# Patient Record
Sex: FEMALE | Race: WHITE | NOT HISPANIC OR LATINO | Employment: FULL TIME | URBAN - NONMETROPOLITAN AREA
[De-identification: names, ages, dates, MRNs, and addresses within clinical notes are randomized per-mention and may not be internally consistent; named-entity substitution may affect disease eponyms.]

---
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
## 2010-08-31 LAB — HCV AB SER-ACNC: NEGATIVE

## 2023-03-30 ENCOUNTER — OFFICE VISIT (OUTPATIENT)
Dept: URGENT CARE | Facility: CLINIC | Age: 33
End: 2023-03-30

## 2023-03-30 VITALS
BODY MASS INDEX: 28.25 KG/M2 | RESPIRATION RATE: 18 BRPM | HEART RATE: 80 BPM | WEIGHT: 180 LBS | HEIGHT: 67 IN | DIASTOLIC BLOOD PRESSURE: 66 MMHG | SYSTOLIC BLOOD PRESSURE: 130 MMHG | OXYGEN SATURATION: 99 % | TEMPERATURE: 97.6 F

## 2023-03-30 DIAGNOSIS — S93.402A SPRAIN OF LEFT ANKLE, UNSPECIFIED LIGAMENT, INITIAL ENCOUNTER: Primary | ICD-10-CM

## 2023-03-30 NOTE — PROGRESS NOTES
[facility]        NAME: Dominique Alvarez is a 27 y o  female  : 1990    MRN: [id number]  DATE: 2023  TIME: 12:17 PM    Assessment and Plan   Sprain of left ankle, unspecified ligament, initial encounter [S93 402A]  1  Sprain of left ankle, unspecified ligament, initial encounter          Discussed problem with patient  Following [place] ankle rules, imaging not necessary for left ankle sprain  Advised conservative management by using RICE therapy as well as as needed Tylenol and ibuprofen for pain symptoms  Follow-up with PCP if symptoms not improve and report to the ER symptoms worsen  Patient Instructions       Follow up with PCP in 3-5 days  Proceed to  ER if symptoms worsen  Chief Complaint     Chief Complaint   Patient presents with   • Leg Pain     C/o bilateral leg pain after derby on Tuesday; another player fell on to left leg whereas right leg was kicked back in February  History of Present Illness       49-year-old female presents after a leg injury that occurred 3 days ago  Patient participates in [employer] and states that she got hit into another girl who fell on top of her left ankle  Patient stated she had mild pain complaints at that time but has done nothing for conservative management  Patient was immediately able to get back up and continue playing the rest of the game  Patient reports she was only reported to urgent care because her  told her to be seen  Reports mild left ankle pain complaints but is ambulating without difficulty  Denies any numbness or tingling  Leg Pain         Review of Systems   Review of Systems   Constitutional: Negative for appetite change, chills, fatigue and fever  Respiratory: Negative for cough, shortness of breath, wheezing and stridor  Cardiovascular: Negative for chest pain and palpitations  Musculoskeletal: Negative for gait problem, joint swelling, neck pain and neck stiffness          Minor "left ankle pain   Neurological: Negative for dizziness, syncope, light-headedness and headaches  Current Medications     No current outpatient medications on file  Current Allergies     Allergies as of 03/30/2023 - Reviewed 03/30/2023   Allergen Reaction Noted   • Penicillins Itching and Rash 03/26/2008            The following portions of the patient's history were reviewed and updated as appropriate: allergies, current medications, past family history, past medical history, past social history, past surgical history and problem list      Past Medical History:   Diagnosis Date   • No known health problems        Past Surgical History:   Procedure Laterality Date   • LEG SURGERY Right        Family History   Problem Relation Age of Onset   • No Known Problems Mother    • No Known Problems Father          Medications have been verified  Objective   /66   Pulse 80   Temp 97 6 °F (36 4 °C)   Resp 18   Ht 5' 6 75\" (1 695 m)   Wt 81 6 kg (180 lb)   LMP 02/28/2023   SpO2 99%   BMI 28 40 kg/m²        Physical Exam     Physical Exam  Vitals and nursing note reviewed  Constitutional:       General: She is not in acute distress  Appearance: Normal appearance  She is normal weight  She is not ill-appearing, toxic-appearing or diaphoretic  HENT:      Head: Normocephalic  Cardiovascular:      Rate and Rhythm: Normal rate and regular rhythm  Pulses: Normal pulses  Heart sounds: Normal heart sounds  No murmur heard  No friction rub  No gallop  Pulmonary:      Effort: Pulmonary effort is normal  No respiratory distress  Breath sounds: Normal breath sounds  No stridor  No wheezing, rhonchi or rales  Chest:      Chest wall: No tenderness  Musculoskeletal:      Right ankle: Normal       Left ankle: No swelling or deformity  Tenderness present  Normal range of motion  Anterior drawer test negative  Normal pulse        Left Achilles Tendon: Normal       Comments: Mild " generalized tenderness through left ankle  Full range of motion on active and passive movements without pain  No overlying skin changes such as bruising, swelling, deformity  +2 TP and DP pulses  Denies any numbness or tingling  Skin:     General: Skin is warm  Capillary Refill: Capillary refill takes less than 2 seconds  Coloration: Skin is not jaundiced or pale  Findings: No erythema  Neurological:      General: No focal deficit present  Mental Status: She is alert and oriented to person, place, and time     Psychiatric:         Mood and Affect: Mood normal          Behavior: Behavior normal

## 2023-03-30 NOTE — LETTER
March 30, 2023     Patient: Chon Prajapati   YOB: 1990   Date of Visit: 3/30/2023       To Whom it May Concern:    Bandar Blocker was seen in my clinic on 3/30/2023  Left ankle suggested of sprain and may return to [place]  If you have any questions or concerns, please don't hesitate to call           Sincerely,          Maria Luz Worrell PA-C        CC: No Recipients

## 2023-05-22 ENCOUNTER — OFFICE VISIT (OUTPATIENT)
Dept: URGENT CARE | Facility: CLINIC | Age: 33
End: 2023-05-22

## 2023-05-22 ENCOUNTER — HOSPITAL ENCOUNTER (EMERGENCY)
Facility: HOSPITAL | Age: 33
Discharge: HOME | End: 2023-05-22
Attending: EMERGENCY MEDICINE

## 2023-05-22 ENCOUNTER — APPOINTMENT (EMERGENCY)
Dept: CT IMAGING | Facility: HOSPITAL | Age: 33
End: 2023-05-22

## 2023-05-22 VITALS
HEIGHT: 67 IN | DIASTOLIC BLOOD PRESSURE: 88 MMHG | OXYGEN SATURATION: 99 % | WEIGHT: 180 LBS | TEMPERATURE: 98.8 F | HEART RATE: 77 BPM | RESPIRATION RATE: 18 BRPM | SYSTOLIC BLOOD PRESSURE: 132 MMHG | BODY MASS INDEX: 28.25 KG/M2

## 2023-05-22 VITALS
OXYGEN SATURATION: 98 % | SYSTOLIC BLOOD PRESSURE: 137 MMHG | DIASTOLIC BLOOD PRESSURE: 87 MMHG | TEMPERATURE: 97.9 F | BODY MASS INDEX: 28.19 KG/M2 | RESPIRATION RATE: 16 BRPM | HEIGHT: 67 IN | HEART RATE: 75 BPM

## 2023-05-22 DIAGNOSIS — R19.00 PELVIC MASS: Primary | ICD-10-CM

## 2023-05-22 DIAGNOSIS — R10.31 RIGHT LOWER QUADRANT ABDOMINAL PAIN: Primary | ICD-10-CM

## 2023-05-22 DIAGNOSIS — N13.30 HYDRONEPHROSIS, LEFT: ICD-10-CM

## 2023-05-22 LAB
ALBUMIN SERPL BCP-MCNC: 3.8 G/DL (ref 3.5–5)
ALP SERPL-CCNC: 52 U/L (ref 34–104)
ALT SERPL W P-5'-P-CCNC: 11 U/L (ref 7–52)
ANION GAP SERPL CALCULATED.3IONS-SCNC: 7 MMOL/L (ref 4–13)
APTT PPP: 29 SECONDS (ref 23–37)
AST SERPL W P-5'-P-CCNC: 13 U/L (ref 13–39)
BASOPHILS # BLD AUTO: 0.05 THOUSANDS/ÂΜL (ref 0–0.1)
BASOPHILS NFR BLD AUTO: 1 % (ref 0–1)
BILIRUB SERPL-MCNC: 0.65 MG/DL (ref 0.2–1)
BILIRUB UR QL STRIP: NEGATIVE
BUN SERPL-MCNC: 14 MG/DL (ref 5–25)
CALCIUM SERPL-MCNC: 8.7 MG/DL (ref 8.4–10.2)
CHLORIDE SERPL-SCNC: 104 MMOL/L (ref 96–108)
CLARITY UR: CLEAR
CO2 SERPL-SCNC: 25 MMOL/L (ref 21–32)
COLOR UR: YELLOW
CREAT SERPL-MCNC: 0.89 MG/DL (ref 0.6–1.3)
EOSINOPHIL # BLD AUTO: 0.29 THOUSAND/ÂΜL (ref 0–0.61)
EOSINOPHIL NFR BLD AUTO: 3 % (ref 0–6)
ERYTHROCYTE [DISTWIDTH] IN BLOOD BY AUTOMATED COUNT: 13.4 % (ref 11.6–15.1)
EXT PREGNANCY TEST URINE: NEGATIVE
EXT. CONTROL: NORMAL
GFR SERPL CREATININE-BSD FRML MDRD: 84 ML/MIN/1.73SQ M
GLUCOSE SERPL-MCNC: 139 MG/DL (ref 65–140)
GLUCOSE UR STRIP-MCNC: NEGATIVE MG/DL
HCT VFR BLD AUTO: 40.9 % (ref 34.8–46.1)
HGB BLD-MCNC: 12.8 G/DL (ref 11.5–15.4)
HGB UR QL STRIP.AUTO: NEGATIVE
IMM GRANULOCYTES # BLD AUTO: 0.03 THOUSAND/UL (ref 0–0.2)
IMM GRANULOCYTES NFR BLD AUTO: 0 % (ref 0–2)
INR PPP: 0.99 (ref 0.84–1.19)
KETONES UR STRIP-MCNC: NEGATIVE MG/DL
LACTATE SERPL-SCNC: 1 MMOL/L (ref 0.5–2)
LEUKOCYTE ESTERASE UR QL STRIP: NEGATIVE
LIPASE SERPL-CCNC: 68 U/L (ref 11–82)
LYMPHOCYTES # BLD AUTO: 2.13 THOUSANDS/ÂΜL (ref 0.6–4.47)
LYMPHOCYTES NFR BLD AUTO: 23 % (ref 14–44)
MCH RBC QN AUTO: 28.8 PG (ref 26.8–34.3)
MCHC RBC AUTO-ENTMCNC: 31.3 G/DL (ref 31.4–37.4)
MCV RBC AUTO: 92 FL (ref 82–98)
MONOCYTES # BLD AUTO: 0.77 THOUSAND/ÂΜL (ref 0.17–1.22)
MONOCYTES NFR BLD AUTO: 8 % (ref 4–12)
NEUTROPHILS # BLD AUTO: 6.08 THOUSANDS/ÂΜL (ref 1.85–7.62)
NEUTS SEG NFR BLD AUTO: 65 % (ref 43–75)
NITRITE UR QL STRIP: NEGATIVE
NRBC BLD AUTO-RTO: 0 /100 WBCS
PH UR STRIP.AUTO: 6 [PH]
PLATELET # BLD AUTO: 277 THOUSANDS/UL (ref 149–390)
PMV BLD AUTO: 10.8 FL (ref 8.9–12.7)
POTASSIUM SERPL-SCNC: 3.4 MMOL/L (ref 3.5–5.3)
PROCALCITONIN SERPL-MCNC: <0.05 NG/ML
PROT SERPL-MCNC: 6.9 G/DL (ref 6.4–8.4)
PROT UR STRIP-MCNC: NEGATIVE MG/DL
PROTHROMBIN TIME: 13.2 SECONDS (ref 11.6–14.5)
RBC # BLD AUTO: 4.44 MILLION/UL (ref 3.81–5.12)
SL AMB  POCT GLUCOSE, UA: NEGATIVE
SL AMB LEUKOCYTE ESTERASE,UA: NEGATIVE
SL AMB POCT BILIRUBIN,UA: NEGATIVE
SL AMB POCT BLOOD,UA: ABNORMAL
SL AMB POCT CLARITY,UA: CLEAR
SL AMB POCT COLOR,UA: YELLOW
SL AMB POCT KETONES,UA: NEGATIVE
SL AMB POCT NITRITE,UA: NEGATIVE
SL AMB POCT PH,UA: 6
SL AMB POCT SPECIFIC GRAVITY,UA: 1.01
SL AMB POCT URINE HCG: NEGATIVE
SL AMB POCT URINE PROTEIN: NEGATIVE
SL AMB POCT UROBILINOGEN: NEGATIVE
SODIUM SERPL-SCNC: 136 MMOL/L (ref 135–147)
SP GR UR STRIP.AUTO: 1.01 (ref 1–1.03)
UROBILINOGEN UR QL STRIP.AUTO: 0.2 E.U./DL
WBC # BLD AUTO: 9.35 THOUSAND/UL (ref 4.31–10.16)

## 2023-05-22 RX ADMIN — IOHEXOL 100 ML: 350 INJECTION, SOLUTION INTRAVENOUS at 21:23

## 2023-05-22 RX ADMIN — SODIUM CHLORIDE 1000 ML: 0.9 INJECTION, SOLUTION INTRAVENOUS at 20:32

## 2023-05-22 NOTE — PROGRESS NOTES
[facility]        NAME: Miguel Carlton is a 27 y o  female  : 1990    MRN: [id number]  DATE: May 22, 2023  TIME: 6:21 PM    Assessment and Plan   Right lower quadrant abdominal pain [R10 31]  1  Right lower quadrant abdominal pain  POCT urine HCG    POCT urine dip    Transfer to other facility        Urine HCG negative  Urine dip negative aside from trace blood  Concern for palpable defect/mass in lower abdomen and requires higher level of care  Recommend she proceed to ED for further evaluation and management of symptoms  Patient agreeable  Complete assessment deferred to ED  Patient Instructions     Patient Instructions     Urine HCG negative   Follow up with PCP in 3-5 days  Proceed to ER for further evaluation and management of symptoms     Abdominal Pain   AMBULATORY CARE:   Abdominal pain  may be felt anywhere between the bottom of your rib cage and your groin  Acute pain usually lasts less than 3 months  Chronic pain lasts longer than 3 months  Your pain may be sharp or dull  The pain may stay in the same place or move around  You may have the pain all the time, or it may come and go  Depending on the cause, you may also have nausea, vomiting, fever, or diarrhea  Call your local emergency number (911 in the [address] [place] [address],[address]) if:   • You have chest pain or shortness of breath  Seek care immediately if:   • You have pulsing pain in your upper abdomen or lower back that suddenly becomes constant  • Your pain is in the right lower abdominal area and worsens with movement  • You have a fever over 100 4°F (38°C) or shaking chills  • You are vomiting and cannot keep food or liquids down  • Your pain does not improve or gets worse over the next 8 to 12 hours  • You see blood in your vomit or bowel movements, or they look black and tarry  • Your skin or the whites of your eyes turn yellow      • You are a woman and have a large amount of vaginal bleeding that is not your monthly period  Call your doctor if:   • You have pain in your lower back  • You are a man and have pain in your testicles  • You have pain when you urinate  • You have questions or concerns about your condition or care  The cause of your abdominal pain  may not be found  The following are common causes:  • Overeating, gas pains, or food poisoning    • Constipation or diarrhea    • An injury    • Appendicitis, a hernia, or an ulcer    • Infection or a blockage    • A liver, gallbladder, or kidney condition    Treatment for abdominal pain  may include any of the following:  • Medicines  may be given to calm your stomach or prevent vomiting  • Prescription pain medicine  may be given  Ask your healthcare provider how to take this medicine safely  Some prescription pain medicines contain acetaminophen  Do not take other medicines that contain acetaminophen without talking to your healthcare provider  Too much acetaminophen may cause liver damage  Prescription pain medicine may cause constipation  Ask your healthcare provider how to prevent or treat constipation  • Relaxation therapy  may be used along with pain medicine  • Surgery  may be needed, depending on the cause  Manage or prevent abdominal pain:   • Apply heat  on your abdomen for 20 to 30 minutes every 2 hours for as many days as directed  Heat helps decrease pain and muscle spasms  • Make changes to the foods you eat, if needed  Do not eat foods that cause abdominal pain or other symptoms  Eat small meals more often  The following changes may also help:    ? Eat more high-fiber foods if you are constipated  High-fiber foods include fruits, vegetables, whole-grain foods, and legumes such as cueto beans  ? Do not eat foods that cause gas if you have bloating  Examples include broccoli, cabbage, beans, and carbonated drinks      ? Do not eat foods or drinks that contain sorbitol or fructose if you have diarrhea and bloating  Some examples are fruit juices, candy, jelly, and sugar-free gum  ? Do not eat high-fat foods  Examples include fried foods, cheeseburgers, hot dogs, and desserts  • Make changes to the liquids you drink, if needed  Do not drink liquids that cause pain or make it worse, such as orange juice  Drink liquids throughout the day to stay hydrated  The following changes may also help:    ? Drink more liquids to prevent dehydration from diarrhea or vomiting  Ask your healthcare provider how much liquid to drink each day and which liquids are best for you  ? Limit or do not have caffeine  Caffeine may make symptoms such as heartburn or nausea worse  ? Limit or do not drink alcohol  Alcohol can make your abdominal pain worse  Ask your healthcare provider if it is okay for you to drink alcohol  Also ask how much is okay for you to drink  A drink of alcohol is 12 ounces of beer, ½ ounce of liquor, or 5 ounces of wine  • Keep a diary of your abdominal pain  A diary may help your healthcare provider learn what is causing your pain  Include when the pain happens, how long it lasts, and what the pain feels like  Write down any other symptoms you have with abdominal pain  Also write down what you eat, and what symptoms you have after you eat  • Manage stress  Stress may cause abdominal pain  Your healthcare provider may recommend relaxation techniques and deep breathing exercises to help decrease your stress  Your healthcare provider may recommend you talk to someone about your stress or anxiety, such as a counselor or a friend  Get plenty of sleep  Exercise regularly  • Do not smoke  Nicotine and other chemicals in cigarettes can damage your esophagus and stomach  Ask your healthcare provider for information if you currently smoke and need help to quit  E-cigarettes or smokeless tobacco still contain nicotine  Talk to your healthcare provider before you use these products      Follow up "with your doctor as directed:  Write down your questions so you remember to ask them during your visits  © Copyright Fritz Niño 2022 Information is for End User's use only and may not be sold, redistributed or otherwise used for commercial purposes  The above information is an  only  It is not intended as medical advice for individual conditions or treatments  Talk to your doctor, nurse or pharmacist before following any medical regimen to see if it is safe and effective for you  Chief Complaint     Chief Complaint   Patient presents with   • Abdominal Pain     C/o RLQ pain x1 month; denies any urinary s/s  Last BM was today         History of Present Illness       18-year-old female with no significant past medical history presents with complaints of lower abdominal pain and \"tightness\" x1 month  Patient states that pain will wax and wane however occurs daily  She is reporting right lower quadrant pain at present time  Denies fever, chills, vomiting, diarrhea, or urinary symptoms  Last BM today  She did take OTC Tylenol/ibuprofen previously for pain and stated mild improvement  Denies chance of pregnancy, LMP 5/15/2023  Review of Systems   Review of Systems   Constitutional: Negative for chills and fever  Respiratory: Negative for shortness of breath  Cardiovascular: Negative for chest pain  Gastrointestinal: Positive for abdominal pain  Negative for diarrhea, nausea and vomiting  Genitourinary: Negative for difficulty urinating and dysuria  Skin: Negative for rash  Current Medications     No current outpatient medications on file      Current Allergies     Allergies as of 05/22/2023 - Reviewed 05/22/2023   Allergen Reaction Noted   • Penicillins Itching and Rash 03/26/2008            The following portions of the patient's history were reviewed and updated as appropriate: allergies, current medications, past family history, past medical history, past social " "history, past surgical history and problem list      Past Medical History:   Diagnosis Date   • No known health problems        Past Surgical History:   Procedure Laterality Date   • LEG SURGERY Right        Family History   Problem Relation Age of Onset   • No Known Problems Mother    • No Known Problems Father          Medications have been verified  Objective   /88   Pulse 77   Temp 98 8 °F (37 1 °C)   Resp 18   Ht 5' 7\" (1 702 m)   Wt 81 6 kg (180 lb)   LMP 05/15/2023   SpO2 99%   BMI 28 19 kg/m²   Patient's last menstrual period was 05/15/2023  Physical Exam     Physical Exam  Vitals and nursing note reviewed  Constitutional:       General: She is not in acute distress  Appearance: She is not toxic-appearing  HENT:      Head: Normocephalic  Nose: Nose normal       Mouth/Throat:      Mouth: Mucous membranes are moist       Pharynx: Oropharynx is clear  Eyes:      Conjunctiva/sclera: Conjunctivae normal    Cardiovascular:      Rate and Rhythm: Normal rate and regular rhythm  Heart sounds: Normal heart sounds  Pulmonary:      Effort: Pulmonary effort is normal  No respiratory distress  Breath sounds: Normal breath sounds  No stridor  No wheezing, rhonchi or rales  Abdominal:      General: Bowel sounds are normal       Palpations: There is mass  Tenderness: There is abdominal tenderness in the right lower quadrant  There is no right CVA tenderness or left CVA tenderness  Skin:     General: Skin is warm and dry  Neurological:      Mental Status: She is alert and oriented to person, place, and time  Gait: Gait is intact     Psychiatric:         Mood and Affect: Mood normal          Behavior: Behavior normal                    "

## 2023-05-22 NOTE — PATIENT INSTRUCTIONS
Urine HCG negative   Follow up with PCP in 3-5 days  Proceed to ER for further evaluation and management of symptoms     Abdominal Pain   AMBULATORY CARE:   Abdominal pain  may be felt anywhere between the bottom of your rib cage and your groin  Acute pain usually lasts less than 3 months  Chronic pain lasts longer than 3 months  Your pain may be sharp or dull  The pain may stay in the same place or move around  You may have the pain all the time, or it may come and go  Depending on the cause, you may also have nausea, vomiting, fever, or diarrhea  Call your local emergency number (911 in the [address] [facility],[address]) if:   You have chest pain or shortness of breath  Seek care immediately if:   You have pulsing pain in your upper abdomen or lower back that suddenly becomes constant  Your pain is in the right lower abdominal area and worsens with movement  You have a fever over 100 4°F (38°C) or shaking chills  You are vomiting and cannot keep food or liquids down  Your pain does not improve or gets worse over the next 8 to 12 hours  You see blood in your vomit or bowel movements, or they look black and tarry  Your skin or the whites of your eyes turn yellow  You are a woman and have a large amount of vaginal bleeding that is not your monthly period  Call your doctor if:   You have pain in your lower back  You are a man and have pain in your testicles  You have pain when you urinate  You have questions or concerns about your condition or care  The cause of your abdominal pain  may not be found  The following are common causes:  Overeating, gas pains, or food poisoning    Constipation or diarrhea    An injury    Appendicitis, a hernia, or an ulcer    Infection or a blockage    A liver, gallbladder, or kidney condition    Treatment for abdominal pain  may include any of the following:  Medicines  may be given to calm your stomach or prevent vomiting      Prescription pain medicine  may be given  Ask your healthcare provider how to take this medicine safely  Some prescription pain medicines contain acetaminophen  Do not take other medicines that contain acetaminophen without talking to your healthcare provider  Too much acetaminophen may cause liver damage  Prescription pain medicine may cause constipation  Ask your healthcare provider how to prevent or treat constipation  Relaxation therapy  may be used along with pain medicine  Surgery  may be needed, depending on the cause  Manage or prevent abdominal pain:   Apply heat  on your abdomen for 20 to 30 minutes every 2 hours for as many days as directed  Heat helps decrease pain and muscle spasms  Make changes to the foods you eat, if needed  Do not eat foods that cause abdominal pain or other symptoms  Eat small meals more often  The following changes may also help:    Eat more high-fiber foods if you are constipated  High-fiber foods include fruits, vegetables, whole-grain foods, and legumes such as cueto beans  Do not eat foods that cause gas if you have bloating  Examples include broccoli, cabbage, beans, and carbonated drinks  Do not eat foods or drinks that contain sorbitol or fructose if you have diarrhea and bloating  Some examples are fruit juices, candy, jelly, and sugar-free gum  Do not eat high-fat foods  Examples include fried foods, cheeseburgers, hot dogs, and desserts  Make changes to the liquids you drink, if needed  Do not drink liquids that cause pain or make it worse, such as orange juice  Drink liquids throughout the day to stay hydrated  The following changes may also help:    Drink more liquids to prevent dehydration from diarrhea or vomiting  Ask your healthcare provider how much liquid to drink each day and which liquids are best for you  Limit or do not have caffeine  Caffeine may make symptoms such as heartburn or nausea worse  Limit or do not drink alcohol    Alcohol can make your abdominal pain worse  Ask your healthcare provider if it is okay for you to drink alcohol  Also ask how much is okay for you to drink  A drink of alcohol is 12 ounces of beer, ½ ounce of liquor, or 5 ounces of wine  Keep a diary of your abdominal pain  A diary may help your healthcare provider learn what is causing your pain  Include when the pain happens, how long it lasts, and what the pain feels like  Write down any other symptoms you have with abdominal pain  Also write down what you eat, and what symptoms you have after you eat  Manage stress  Stress may cause abdominal pain  Your healthcare provider may recommend relaxation techniques and deep breathing exercises to help decrease your stress  Your healthcare provider may recommend you talk to someone about your stress or anxiety, such as a counselor or a friend  Get plenty of sleep  Exercise regularly  Do not smoke  Nicotine and other chemicals in cigarettes can damage your esophagus and stomach  Ask your healthcare provider for information if you currently smoke and need help to quit  E-cigarettes or smokeless tobacco still contain nicotine  Talk to your healthcare provider before you use these products  Follow up with your doctor as directed:  Write down your questions so you remember to ask them during your visits  © Copyright Yara Gruber 2022 Information is for End User's use only and may not be sold, redistributed or otherwise used for commercial purposes  The above information is an  only  It is not intended as medical advice for individual conditions or treatments  Talk to your doctor, nurse or pharmacist before following any medical regimen to see if it is safe and effective for you

## 2023-05-22 NOTE — Clinical Note
Hedy Burgos was seen and treated in our emergency department on 5/22/2023  Diagnosis:     Rea Randhawa  may return to work on return date  She may return on this date: 05/29/2023         If you have any questions or concerns, please don't hesitate to call        Lenny Rudolph, DO    ______________________________           _______________          _______________  Hospital Representative                              Date                                Time

## 2023-05-23 ENCOUNTER — TELEPHONE (OUTPATIENT)
Dept: UROLOGY | Facility: MEDICAL CENTER | Age: 33
End: 2023-05-23

## 2023-05-23 ENCOUNTER — TELEPHONE (OUTPATIENT)
Dept: HEMATOLOGY ONCOLOGY | Facility: CLINIC | Age: 33
End: 2023-05-23

## 2023-05-23 NOTE — ED CARE HANDOFF
Emergency Department Sign Out Note        Sign out and transfer of care  See Separate Emergency Department note  The patient, Darren Bee, was evaluated by the previous provider                              ED Course as of 05/23/23 0042   Mon May 22, 2023   2100 Endorsed by [facility] for lower abdominal discomfort for past maybe 2 months, pelvic mass changes, labs and CT pending  2131 UA w Reflex to Microscopic w Reflex to Culture   2131 PREGNANCY TEST URINE: Negative   2131 Potassium(!): 3 4   2131 LACTIC ACID: 1 0   2131 WBC: 9 35   2150 Procalcitonin: <0 05   2218 CT abdomen pelvis with contrast  IMPRESSION:     Two cystic masses in the pelvis the largest is in the midline likely arising from the left ovary measuring 15 7 x 11 0 cm with solid enhancing mural nodules measuring 1 7 cm on image 2/119 and 1 5 cm on image 2/128  Similar appearing but smaller mass   posteriorly likely from the right ovary measures 5 2 x 3 8 cm  Findings could represent endometriomata or mucinous neoplasm  Recommend GYN surgery consultation  The large midline cystic pelvic mass results in moderate left hydroureteronephrosis       2235 Blood pressure improved  Labs reviewed including inflammatory markers which are normal-appearing  Discussed with Jl Brandt and will arrange evaluation as soon as possible as outpatient   [phone] Patient informed of results, copy of CT provided  She is agreeable with close outpatient follow-up and will arrange as soon as possible    She remains comfortable and clinically well and voices understanding to arrange gynecology and urology follow-up as soon as possible and will return immediately if worse or new symptoms     Procedures  MDM        Disposition  Final diagnoses:   Pelvic mass - cystic   Hydronephrosis, left     Time reflects when diagnosis was documented in both MDM as applicable and the Disposition within this note     Time User Action Codes Description Comment    5/22/2023 10:32 PM Durga Javon Add [R19 00] Pelvic mass     5/22/2023 10:33 PM Durga Javon Modify [R19 00] Pelvic mass cystic    5/22/2023 10:33 PM Durga Javon Add [N13 30] Hydronephrosis, left       ED Disposition     ED Disposition   Discharge    Condition   Stable    Date/Time   Mon May 22, 2023 10:31 PM    Comment   Naty Cao discharge to home/self care  Follow-up Information     Follow up With Specialties Details Why Fe Young MD Gynecologic Oncology Schedule an appointment as soon as possible for a visit in 1 week  [address]      Amadeo Lange MD Urology Schedule an appointment as soon as possible for a visit in 1 week  [address] [facility] [address]  [phone]          There are no discharge medications for this patient             ED Provider  Electronically Signed by     Shaun Daniel DO  05/23/23 5670

## 2023-05-23 NOTE — TELEPHONE ENCOUNTER
New Patient Triage  Please Triage:   New Patient-   What is the reason for the patients appointment? Imaging/Lab Results: Ct results   IMPRESSION:     Two cystic masses in the pelvis the largest is in the midline likely arising from the left ovary measuring 15 7 x 11 0 cm with solid enhancing mural nodules measuring 1 7 cm on image 2/119 and 1 5 cm on image 2/128  Similar appearing but smaller mass   posteriorly likely from the right ovary measures 5 2 x 3 8 cm  Findings could represent endometriomata or mucinous neoplasm  Recommend GYN surgery consultation      The large midline cystic pelvic mass results in moderate left hydroureteronephrosis      Insurance: [employer]   Do we accept the pt's insurance or is the patient Self-Pay? Provider & Plan:    Member ID#: [id number]    History  Has the pt had any previous urologist? no    Have pt records been requested?  No    Has the pt had any outside testing done? no    Does the pt have a personal history of cancer?: no

## 2023-05-23 NOTE — ED PROVIDER NOTES
History  Chief Complaint   Patient presents with   • Abdominal Pain     Pt reports RLQ pain worsening over that last month  Patient is a 57-year-old female presents the emergency department due to pain in the right lower quadrant and the abdomen started about 1 month ago has been intermittent worsening was seen at urgent care today referred to the ED for further evaluation  No nausea or vomiting no diarrhea no constipation no fevers or chills  No prior abdominal surgeries  Feels a fullness and bloating pressure in the suprapubic and right lower abdomen  History provided by:  Patient  Abdominal Pain  Pain location:  RLQ  Pain quality: aching and cramping    Pain severity:  Moderate  Onset quality:  Gradual  Duration:  4 weeks  Timing:  Intermittent  Progression:  Worsening  Chronicity:  New  Associated symptoms: no chest pain, no chills, no constipation, no cough, no diarrhea, no dysuria, no fatigue, no fever, no hematuria, no nausea, no shortness of breath, no sore throat and no vomiting        None       Past Medical History:   Diagnosis Date   • No known health problems        Past Surgical History:   Procedure Laterality Date   • LEG SURGERY Right        Family History   Problem Relation Age of Onset   • No Known Problems Mother    • No Known Problems Father      I have reviewed and agree with the history as documented  E-Cigarette/Vaping   • E-Cigarette Use Never User      E-Cigarette/Vaping Substances     Social History     Tobacco Use   • Smoking status: Never   • Smokeless tobacco: Never   Vaping Use   • Vaping Use: Never used   Substance Use Topics   • Alcohol use: Yes     Comment: Occasional   • Drug use: Never       Review of Systems   Constitutional: Negative for activity change, appetite change, chills, fatigue and fever  HENT: Negative for congestion, ear pain, rhinorrhea and sore throat  Eyes: Negative for discharge, redness and visual disturbance     Respiratory: Negative for cough, chest tightness, shortness of breath and wheezing  Cardiovascular: Negative for chest pain and palpitations  Gastrointestinal: Positive for abdominal pain  Negative for constipation, diarrhea, nausea and vomiting  Endocrine: Negative for polydipsia and polyuria  Genitourinary: Negative for difficulty urinating, dysuria, frequency, hematuria and urgency  Musculoskeletal: Negative for arthralgias and myalgias  Skin: Negative for color change, pallor and rash  Neurological: Negative for dizziness, weakness, light-headedness, numbness and headaches  Hematological: Negative for adenopathy  Does not bruise/bleed easily  All other systems reviewed and are negative  Physical Exam  Physical Exam  Vitals and nursing note reviewed  Constitutional:       Appearance: She is well-developed  HENT:      Head: Normocephalic and atraumatic  Right Ear: External ear normal       Left Ear: External ear normal       Nose: Nose normal    Eyes:      Conjunctiva/sclera: Conjunctivae normal       Pupils: Pupils are equal, round, and reactive to light  Cardiovascular:      Rate and Rhythm: Normal rate and regular rhythm  Heart sounds: Normal heart sounds  Pulmonary:      Effort: Pulmonary effort is normal  No respiratory distress  Breath sounds: Normal breath sounds  No wheezing or rales  Chest:      Chest wall: No tenderness  Abdominal:      General: Bowel sounds are normal  There is no distension  Palpations: Abdomen is soft  There is mass  Tenderness: There is abdominal tenderness in the right lower quadrant and suprapubic area  There is no guarding or rebound  Musculoskeletal:         General: Normal range of motion  Cervical back: Normal range of motion and neck supple  Skin:     General: Skin is warm and dry  Neurological:      Mental Status: She is alert and oriented to person, place, and time  Cranial Nerves: No cranial nerve deficit        Sensory: No sensory deficit  Vital Signs  ED Triage Vitals [05/22/23 1958]   Temperature Pulse Respirations Blood Pressure SpO2   97 9 °F (36 6 °C) 86 18 (!) 155/111 99 %      Temp Source Heart Rate Source Patient Position - Orthostatic VS BP Location FiO2 (%)   Temporal Monitor Sitting Left arm --      Pain Score       7           Vitals:    05/22/23 1958   BP: (!) 155/111   Pulse: 86   Patient Position - Orthostatic VS: Sitting         Visual Acuity      ED Medications  Medications   sodium chloride 0 9 % bolus 1,000 mL (1,000 mL Intravenous New Bag 5/22/23 2032)       Diagnostic Studies  Results Reviewed     Procedure Component Value Units Date/Time    Lactic acid, plasma (w/reflex if result > 2 0) [[id number]]  (Normal) Collected: 05/22/23 2034    Lab Status: Final result Specimen: Blood from Arm, Left Updated: 05/22/23 2100     LACTIC ACID 1 0 mmol/L     Narrative:      Result may be elevated if tourniquet was used during collection      Protime-INR [[id number]]  (Normal) Collected: 05/22/23 2034    Lab Status: Final result Specimen: Blood from Arm, Left Updated: 05/22/23 2056     Protime 13 2 seconds      INR 0 99    APTT [[id number]]  (Normal) Collected: 05/22/23 2034    Lab Status: Final result Specimen: Blood from Arm, Left Updated: 05/22/23 2056     PTT 29 seconds     UA w Reflex to Microscopic w Reflex to Culture [[id number]] Collected: 05/22/23 2042    Lab Status: Final result Specimen: Urine, Clean Catch Updated: 05/22/23 2048     Color, UA Yellow     Clarity, UA Clear     Specific Gravity, UA 1 010     pH, UA 6 0     Leukocytes, UA Negative     Nitrite, UA Negative     Protein, UA Negative mg/dl      Glucose, UA Negative mg/dl      Ketones, UA Negative mg/dl      Urobilinogen, UA 0 2 E U /dl      Bilirubin, UA Negative     Occult Blood, UA Negative    POCT pregnancy, urine [[id number]]  (Normal) Resulted: 05/22/23 2044    Lab Status: Final result Updated: 05/22/23 2044     EXT Preg Test, Ur Negative     Control Valid    CBC and differential [[id number]]  (Abnormal) Collected: 05/22/23 2034    Lab Status: Final result Specimen: Blood from Arm, Left Updated: 05/22/23 2042     WBC 9 35 Thousand/uL      RBC 4 44 Million/uL      Hemoglobin 12 8 g/dL      Hematocrit 40 9 %      MCV 92 fL      MCH 28 8 pg      MCHC 31 3 g/dL      RDW 13 4 %      MPV 10 8 fL      Platelets 657 Thousands/uL      nRBC 0 /100 WBCs      Neutrophils Relative 65 %      Immat GRANS % 0 %      Lymphocytes Relative 23 %      Monocytes Relative 8 %      Eosinophils Relative 3 %      Basophils Relative 1 %      Neutrophils Absolute 6 08 Thousands/µL      Immature Grans Absolute 0 03 Thousand/uL      Lymphocytes Absolute 2 13 Thousands/µL      Monocytes Absolute 0 77 Thousand/µL      Eosinophils Absolute 0 29 Thousand/µL      Basophils Absolute 0 05 Thousands/µL     Procalcitonin [[id number]] Collected: 05/22/23 2034    Lab Status: In process Specimen: Blood from Arm, Left Updated: 05/22/23 2039    Comprehensive metabolic panel [[id number]] Collected: 05/22/23 2034    Lab Status: In process Specimen: Blood from Arm, Left Updated: 05/22/23 2039    Lipase [[id number]] Collected: 05/22/23 2034    Lab Status: In process Specimen: Blood from Arm, Left Updated: 05/22/23 2039                 CT abdomen pelvis with contrast    (Results Pending)              Procedures  Procedures         ED Course  ED Course as of 05/22/23 2109   Mon May 22, 2023   2105 Case discussed and care transferred to Dr Jared Mejia pending imaging results and disposition  SBIRT 20yo+    Flowsheet Row Most Recent Value   Initial Alcohol Screen: US AUDIT-C     1  How often do you have a drink containing alcohol? 0 Filed at: 05/22/2023 2016   2  How many drinks containing alcohol do you have on a typical day you are drinking? 0 Filed at: 05/22/2023 2016   3b  FEMALE Any Age, or MALE 65+: How often do you have 4 or more drinks on one occassion?  0 Filed at: 05/22/2023 2016 Audit-C Score 0 Filed at: 05/22/2023 2016   TERESITA: How many times in the past year have you    Used an illegal drug or used a prescription medication for non-medical reasons? Never Filed at: 05/22/2023 2016                    MDM    Disposition  Final diagnoses:   None     ED Disposition     None      Follow-up Information    None         Patient's Medications    No medications on file       No discharge procedures on file      PDMP Review     None          ED Provider  Electronically Signed by           Alicia Zepeda DO  05/22/23 5045

## 2023-05-23 NOTE — TELEPHONE ENCOUNTER
Appointment Schedule   Who are you speaking with? Patient   If it is not the patient, are they listed on an active communication consent form? N/A   Which provider is the appointment scheduled with? Dr Dc Mcintyre   At which location is the appointment scheduled for? [facility]   When is the appointment scheduled? Please list date and time 5/30 1pm    What is the reason for this appointment? New patient appointment    Did patient voice understanding of the details of this appointment? Yes   Was the no show policy reviewed with patient?  Yes

## 2023-05-23 NOTE — TELEPHONE ENCOUNTER
Patient Call    Who are you speaking with? Patient    If it is not the patient, are they listed on an active communication consent form? N/A   What is the reason for this call? Patient is checking in on the status of her referral    I informed the patient that the team is reviewing it currently and they will give her a call back to schedule  Does this require a call back? Yes   If a call back is required, please list best call back number [phone]   If a call back is required, advise that a message will be forwarded to their care team and someone will return their call as soon as possible  Did you relay this information to the patient?  Yes

## 2023-05-23 NOTE — DISCHARGE INSTRUCTIONS
Pelvic cystic masses with related hydronephrosis  Please arrange to see gynecology oncology soon as possible as well as urology  Return immediately if worse or any new symptoms

## 2023-05-25 ENCOUNTER — TELEPHONE (OUTPATIENT)
Dept: HEMATOLOGY ONCOLOGY | Facility: CLINIC | Age: 33
End: 2023-05-25

## 2023-05-25 NOTE — TELEPHONE ENCOUNTER
Appointment Confirmation   Who are you speaking with? Patient   If it is not the patient, are they listed on an active communication consent form? N/A   Which provider is the appointment scheduled with? Dr Endy Alcantara   When is the appointment scheduled? Please list date and time 5/30/23 1:00PM   At which location is the appointment scheduled to take place? [facility]   Did caller verbalize understanding of appointment details?  Yes     Verified we take [place] [employer]

## 2023-05-30 ENCOUNTER — CONSULT (OUTPATIENT)
Dept: GYNECOLOGIC ONCOLOGY | Facility: CLINIC | Age: 33
End: 2023-05-30

## 2023-05-30 VITALS
HEIGHT: 67 IN | HEART RATE: 67 BPM | TEMPERATURE: 97.9 F | BODY MASS INDEX: 29.51 KG/M2 | RESPIRATION RATE: 16 BRPM | WEIGHT: 188 LBS | DIASTOLIC BLOOD PRESSURE: 80 MMHG | OXYGEN SATURATION: 99 % | SYSTOLIC BLOOD PRESSURE: 132 MMHG

## 2023-05-30 DIAGNOSIS — R19.00 PELVIC MASS: ICD-10-CM

## 2023-05-30 PROBLEM — N13.30 HYDRONEPHROSIS: Status: ACTIVE | Noted: 2023-05-30

## 2023-05-30 RX ORDER — CELECOXIB 100 MG/1
200 CAPSULE ORAL ONCE
OUTPATIENT
Start: 2023-05-30 | End: 2023-05-30

## 2023-05-30 RX ORDER — GABAPENTIN 100 MG/1
100 CAPSULE ORAL ONCE
OUTPATIENT
Start: 2023-05-30 | End: 2023-05-30

## 2023-05-30 RX ORDER — ACETAMINOPHEN 325 MG/1
975 TABLET ORAL ONCE
OUTPATIENT
Start: 2023-05-30 | End: 2023-05-30

## 2023-05-30 RX ORDER — HEPARIN SODIUM 5000 [USP'U]/ML
5000 INJECTION, SOLUTION INTRAVENOUS; SUBCUTANEOUS
OUTPATIENT
Start: 2023-05-31 | End: 2023-06-01

## 2023-05-30 NOTE — H&P (VIEW-ONLY)
Assessment/Plan:    Problem List Items Addressed This Visit        Other    Pelvic mass     The patient has been recently diagnosed with a ovarian mass  We discussed with the patient   All new solid masses run approximately 10 to 20% risk of ovarian cancer  These generally do not resolve  We have discussed treatment options including observation with follow-up in 2 months including possible ultrasound at that time, or immediate surgery  The patient remains somewhat ambivalent regarding fertility  We have recommended the following:    Exploratory laparotomy left salpingo-oophorectomy right ovarian cystectomy with possible hysterectomy bilateral salpingo-oophorectomy with possible pelvic and para-aortic lymph node dissection staging biopsies including omentectomy based on findings at frozen section     The patient may elect to have OCHOA/BSO anyway as she is leaning toward no fertility in future and is having pain with her menses  Have discussed risks and benefits of the procedure including bleeding requiring transfusion infection, infection, damage to local structures including bowel bladder ureter and other local organs  We discussed the risk of deep venous thrombosis  An open procedure may be required  All of these complications are in the 2-4% range of likelihood  The patient understands the risks and benefits of the procedure and has signed an informed consent  I personally signed the consent form with her  She does understand that further treatment including chemotherapy radiation therapy or hormones may be required based on the final postoperative pathologic diagnosis and staging  Standard preoperative testing including type and screen is CBC CPM P chest x-ray and EKG will be ordered  Any appropriate consultations for preoperative evaluation will also be ordered  Overall consultation took 60 min with greater than 50% in dedicated toward discussion time           Relevant Orders    Case request operating room: LAPAROTOMY EXPLORATORY left salpingo-oophorectomy right ovarian cystectomy possible OCHOA/BSO staging (Completed)    Type and screen    Comprehensive metabolic panel    HEMOGLOBIN A1C W/ EAG ESTIMATION        XR chest pa & lateral           CHIEF COMPLAINT: Bilateral ovarian masses        Patient ID: Kris Quiroga is a 27 y o  female  She is a very pleasant 26-year-old  0 seen in consultation from the emergency department regarding evaluation and management of abdominal masses  Patient was in her usual state of health when she began to develop abdominal bloating distention and pain over the course of the past 4 weeks  She describes her menstrual periods as lasting 5 to 7 days with moderate degree of flow and not enough pain to require consistent nonsteroidal pain medicines  These have been slowly worsening however over the years  She has no intermenstrual bleeding she has no bowel or bladder complaints she underwent CAT scan which reveals the following:  FINDINGS:     ABDOMEN     LOWER CHEST:  No clinically significant abnormality identified in the visualized lower chest      LIVER/BILIARY TREE:  Unremarkable      GALLBLADDER:  No calcified gallstones  No pericholecystic inflammatory change      SPLEEN:  Unremarkable      PANCREAS:  Unremarkable      ADRENAL GLANDS:  Unremarkable      KIDNEYS/URETERS: Moderate left hydroureteronephrosis due to a large complex cystic mass in the pelvis  See description below  Right kidney normal      STOMACH AND BOWEL:  Unremarkable      APPENDIX: A normal appendix was visualized      ABDOMINOPELVIC CAVITY: Free fluid in the pelvis   No free air or adenopathy      VESSELS:  Unremarkable for patient's age      PELVIS     REPRODUCTIVE ORGANS: There are 2 cystic masses in the pelvis the largest is in the midline likely arising from the right ovary measuring 15 7 x 11 0 cm with solid enhancing mural nodules measuring 1 7 cm on image 2/119 and 1 5 cm on image 2/128  Similar   appearing but smaller mass posteriorly likely from the right ovary measures 5 2 x 3 8 cm      URINARY BLADDER:  Unremarkable      ABDOMINAL WALL/INGUINAL REGIONS:  Unremarkable      OSSEOUS STRUCTURES:  No acute fracture or destructive osseous lesion      IMPRESSION:     Two cystic masses in the pelvis the largest is in the midline likely arising from the left ovary measuring 15 7 x 11 0 cm with solid enhancing mural nodules measuring 1 7 cm on image 2/119 and 1 5 cm on image 2/128  Similar appearing but smaller mass   posteriorly likely from the right ovary measures 5 2 x 3 8 cm  Findings could represent endometriomata or mucinous neoplasm  Recommend GYN surgery consultation      The large midline cystic pelvic mass results in moderate left hydroureteronephrosis  Today, the patient is doing well  She denies significant abdominal pain, pelvic pain, nausea, vomiting, constipation, diarrhea, fevers, chills, or vaginal bleeding  The patient is somewhat ambivalent regarding pregnancy  If a malignancy is identified she does not wish to maintain fertility and wishes to undergo standard of care surgical treatment  However if removal of uterus and ovaries could be avoided with the potential of fertility in the future the patient is interested in this  Review of Systems   Constitutional: Negative  HENT: Negative  Eyes: Negative  Respiratory: Negative  Cardiovascular: Negative  Gastrointestinal: Positive for abdominal distention and abdominal pain  Endocrine: Negative  Genitourinary: Negative  Musculoskeletal: Negative  Skin: Negative  Neurological: Negative  Hematological: Negative  Psychiatric/Behavioral: Negative  No current outpatient medications on file  No current facility-administered medications for this visit         Allergies   Allergen Reactions   • Penicillins Itching and Rash     Rash  Rash         Past Medical History:   Diagnosis "Date   • No known health problems        Past Surgical History:   Procedure Laterality Date   • LEG SURGERY Right        OB History        0    Para   0    Term   0       0    AB   0    Living   0       SAB   0    IAB   0    Ectopic   0    Multiple   0    Live Births   0                 Family History   Problem Relation Age of Onset   • Ovarian cysts Mother    • No Known Problems Father        The following portions of the patient's history were reviewed and updated as appropriate: allergies, current medications, past family history, past social history, past surgical history and problem list       Objective:    Blood pressure 132/80, pulse 67, temperature 97 9 °F (36 6 °C), temperature source Temporal, resp  rate 16, height 5' 7\" (1 702 m), weight 85 3 kg (188 lb), last menstrual period 05/15/2023, SpO2 99 %  Body mass index is 29 44 kg/m²  Physical Exam  Constitutional:       Appearance: She is well-developed  HENT:      Head: Normocephalic and atraumatic  Eyes:      Pupils: Pupils are equal, round, and reactive to light  Cardiovascular:      Rate and Rhythm: Normal rate and regular rhythm  Heart sounds: Normal heart sounds  Pulmonary:      Effort: Pulmonary effort is normal  No respiratory distress  Breath sounds: Normal breath sounds  Abdominal:      General: Bowel sounds are normal  There is no distension  Palpations: Abdomen is soft  Abdomen is not rigid  Tenderness: There is no abdominal tenderness  There is no guarding or rebound  Genitourinary:     Comments: -Normal external female genitalia, normal Bartholin's and Jasper's glands                  -Normal midline urethral meatus   No lesions notes                  -Bladder without fullness mass or tenderness                  -Vagina without lesion or discharge No significant cystocele or rectocele noted                  -Cervix normal appearing without visible lesions                  -Uterus with normal " "contour, mobility  No tenderness,                  -Adnexae with large mass approximately 20 x 10 cm in the midline and extending to the bilateral sidewalls up to the level of the umbilicus  There is no extraovarian masses or nodularity  No tenderness noted                   - Anus without fissure of lesion    Musculoskeletal:         General: Normal range of motion  Cervical back: Normal range of motion and neck supple  Lymphadenopathy:      Cervical: No cervical adenopathy  Upper Body:      Right upper body: No supraclavicular adenopathy  Left upper body: No supraclavicular adenopathy  Skin:     General: Skin is warm and dry  Neurological:      Mental Status: She is alert and oriented to person, place, and time     Psychiatric:         Behavior: Behavior normal            No results found for: \"\"  Lab Results   Component Value Date    HCT 40 9 05/22/2023    HGB 12 8 05/22/2023    MCV 92 05/22/2023     05/22/2023    WBC 9 35 05/22/2023     Lab Results   Component Value Date    ALKPHOS 52 05/22/2023    ALT 11 05/22/2023    AST 13 05/22/2023    BUN 14 05/22/2023    CALCIUM 8 7 05/22/2023     05/22/2023    CO2 25 05/22/2023    CREATININE 0 89 05/22/2023    EGFR 84 05/22/2023    K 3 4 (L) 05/22/2023        Trend:  No results found for: \"\"    "

## 2023-05-30 NOTE — ASSESSMENT & PLAN NOTE
The patient has been recently diagnosed with a ovarian mass  We discussed with the patient   All new solid masses run approximately 10 to 20% risk of ovarian cancer  These generally do not resolve  We have discussed treatment options including observation with follow-up in 2 months including possible ultrasound at that time, or immediate surgery  The patient remains somewhat ambivalent regarding fertility  We have recommended the following:    Exploratory laparotomy left salpingo-oophorectomy right ovarian cystectomy with possible hysterectomy bilateral salpingo-oophorectomy with possible pelvic and para-aortic lymph node dissection staging biopsies including omentectomy based on findings at frozen section     The patient may elect to have OCHOA/BSO anyway as she is leaning toward no fertility in future and is having pain with her menses  Have discussed risks and benefits of the procedure including bleeding requiring transfusion infection, infection, damage to local structures including bowel bladder ureter and other local organs  We discussed the risk of deep venous thrombosis  An open procedure may be required  All of these complications are in the 2-4% range of likelihood  The patient understands the risks and benefits of the procedure and has signed an informed consent  I personally signed the consent form with her  She does understand that further treatment including chemotherapy radiation therapy or hormones may be required based on the final postoperative pathologic diagnosis and staging  Standard preoperative testing including type and screen is CBC CPM P chest x-ray and EKG will be ordered  Any appropriate consultations for preoperative evaluation will also be ordered  Overall consultation took 60 min with greater than 50% in dedicated toward discussion time

## 2023-05-30 NOTE — PROGRESS NOTES
Assessment/Plan:    Problem List Items Addressed This Visit        Other    Pelvic mass     The patient has been recently diagnosed with a ovarian mass  We discussed with the patient   All new solid masses run approximately 10 to 20% risk of ovarian cancer  These generally do not resolve  We have discussed treatment options including observation with follow-up in 2 months including possible ultrasound at that time, or immediate surgery  The patient remains somewhat ambivalent regarding fertility  We have recommended the following:    Exploratory laparotomy left salpingo-oophorectomy right ovarian cystectomy with possible hysterectomy bilateral salpingo-oophorectomy with possible pelvic and para-aortic lymph node dissection staging biopsies including omentectomy based on findings at frozen section     The patient may elect to have OCHOA/BSO anyway as she is leaning toward no fertility in future and is having pain with her menses  Have discussed risks and benefits of the procedure including bleeding requiring transfusion infection, infection, damage to local structures including bowel bladder ureter and other local organs  We discussed the risk of deep venous thrombosis  An open procedure may be required  All of these complications are in the 2-4% range of likelihood  The patient understands the risks and benefits of the procedure and has signed an informed consent  I personally signed the consent form with her  She does understand that further treatment including chemotherapy radiation therapy or hormones may be required based on the final postoperative pathologic diagnosis and staging  Standard preoperative testing including type and screen is CBC CPM P chest x-ray and EKG will be ordered  Any appropriate consultations for preoperative evaluation will also be ordered  Overall consultation took 60 min with greater than 50% in dedicated toward discussion time           Relevant Orders    Case request operating room: LAPAROTOMY EXPLORATORY left salpingo-oophorectomy right ovarian cystectomy possible OCHOA/BSO staging (Completed)    Type and screen    Comprehensive metabolic panel    HEMOGLOBIN A1C W/ EAG ESTIMATION        XR chest pa & lateral           CHIEF COMPLAINT: Bilateral ovarian masses        Patient ID: Miguel Carlton is a 27 y o  female  She is a very pleasant 66-year-old  0 seen in consultation from the emergency department regarding evaluation and management of abdominal masses  Patient was in her usual state of health when she began to develop abdominal bloating distention and pain over the course of the past 4 weeks  She describes her menstrual periods as lasting 5 to 7 days with moderate degree of flow and not enough pain to require consistent nonsteroidal pain medicines  These have been slowly worsening however over the years  She has no intermenstrual bleeding she has no bowel or bladder complaints she underwent CAT scan which reveals the following:  FINDINGS:     ABDOMEN     LOWER CHEST:  No clinically significant abnormality identified in the visualized lower chest      LIVER/BILIARY TREE:  Unremarkable      GALLBLADDER:  No calcified gallstones  No pericholecystic inflammatory change      SPLEEN:  Unremarkable      PANCREAS:  Unremarkable      ADRENAL GLANDS:  Unremarkable      KIDNEYS/URETERS: Moderate left hydroureteronephrosis due to a large complex cystic mass in the pelvis  See description below  Right kidney normal      STOMACH AND BOWEL:  Unremarkable      APPENDIX: A normal appendix was visualized      ABDOMINOPELVIC CAVITY: Free fluid in the pelvis   No free air or adenopathy      VESSELS:  Unremarkable for patient's age      PELVIS     REPRODUCTIVE ORGANS: There are 2 cystic masses in the pelvis the largest is in the midline likely arising from the right ovary measuring 15 7 x 11 0 cm with solid enhancing mural nodules measuring 1 7 cm on image 2/119 and 1 5 cm on image 2/128  Similar   appearing but smaller mass posteriorly likely from the right ovary measures 5 2 x 3 8 cm      URINARY BLADDER:  Unremarkable      ABDOMINAL WALL/INGUINAL REGIONS:  Unremarkable      OSSEOUS STRUCTURES:  No acute fracture or destructive osseous lesion      IMPRESSION:     Two cystic masses in the pelvis the largest is in the midline likely arising from the left ovary measuring 15 7 x 11 0 cm with solid enhancing mural nodules measuring 1 7 cm on image 2/119 and 1 5 cm on image 2/128  Similar appearing but smaller mass   posteriorly likely from the right ovary measures 5 2 x 3 8 cm  Findings could represent endometriomata or mucinous neoplasm  Recommend GYN surgery consultation      The large midline cystic pelvic mass results in moderate left hydroureteronephrosis  Today, the patient is doing well  She denies significant abdominal pain, pelvic pain, nausea, vomiting, constipation, diarrhea, fevers, chills, or vaginal bleeding  The patient is somewhat ambivalent regarding pregnancy  If a malignancy is identified she does not wish to maintain fertility and wishes to undergo standard of care surgical treatment  However if removal of uterus and ovaries could be avoided with the potential of fertility in the future the patient is interested in this  Review of Systems   Constitutional: Negative  HENT: Negative  Eyes: Negative  Respiratory: Negative  Cardiovascular: Negative  Gastrointestinal: Positive for abdominal distention and abdominal pain  Endocrine: Negative  Genitourinary: Negative  Musculoskeletal: Negative  Skin: Negative  Neurological: Negative  Hematological: Negative  Psychiatric/Behavioral: Negative  No current outpatient medications on file  No current facility-administered medications for this visit         Allergies   Allergen Reactions   • Penicillins Itching and Rash     Rash  Rash         Past Medical History:   Diagnosis "Date   • No known health problems        Past Surgical History:   Procedure Laterality Date   • LEG SURGERY Right        OB History        0    Para   0    Term   0       0    AB   0    Living   0       SAB   0    IAB   0    Ectopic   0    Multiple   0    Live Births   0                 Family History   Problem Relation Age of Onset   • Ovarian cysts Mother    • No Known Problems Father        The following portions of the patient's history were reviewed and updated as appropriate: allergies, current medications, past family history, past social history, past surgical history and problem list       Objective:    Blood pressure 132/80, pulse 67, temperature 97 9 °F (36 6 °C), temperature source Temporal, resp  rate 16, height 5' 7\" (1 702 m), weight 85 3 kg (188 lb), last menstrual period 05/15/2023, SpO2 99 %  Body mass index is 29 44 kg/m²  Physical Exam  Constitutional:       Appearance: She is well-developed  HENT:      Head: Normocephalic and atraumatic  Eyes:      Pupils: Pupils are equal, round, and reactive to light  Cardiovascular:      Rate and Rhythm: Normal rate and regular rhythm  Heart sounds: Normal heart sounds  Pulmonary:      Effort: Pulmonary effort is normal  No respiratory distress  Breath sounds: Normal breath sounds  Abdominal:      General: Bowel sounds are normal  There is no distension  Palpations: Abdomen is soft  Abdomen is not rigid  Tenderness: There is no abdominal tenderness  There is no guarding or rebound  Genitourinary:     Comments: -Normal external female genitalia, normal [place]tholin's and [place]'s glands                  -Normal midline urethral meatus   No lesions notes                  -Bladder without fullness mass or tenderness                  -Vagina without lesion or discharge No significant cystocele or rectocele noted                  -Cervix normal appearing without visible lesions                  -Uterus with normal " "contour, mobility  No tenderness,                  -Adnexae with large mass approximately 20 x 10 cm in the midline and extending to the bilateral sidewalls up to the level of the umbilicus  There is no extraovarian masses or nodularity  No tenderness noted                   - Anus without fissure of lesion    Musculoskeletal:         General: Normal range of motion  Cervical back: Normal range of motion and neck supple  Lymphadenopathy:      Cervical: No cervical adenopathy  Upper Body:      Right upper body: No supraclavicular adenopathy  Left upper body: No supraclavicular adenopathy  Skin:     General: Skin is warm and dry  Neurological:      Mental Status: She is alert and oriented to person, place, and time     Psychiatric:         Behavior: Behavior normal            No results found for: \"\"  Lab Results   Component Value Date    HCT 40 9 05/22/2023    HGB 12 8 05/22/2023    MCV 92 05/22/2023     05/22/2023    WBC 9 35 05/22/2023     Lab Results   Component Value Date    ALKPHOS 52 05/22/2023    ALT 11 05/22/2023    AST 13 05/22/2023    BUN 14 05/22/2023    CALCIUM 8 7 05/22/2023     05/22/2023    CO2 25 05/22/2023    CREATININE 0 89 05/22/2023    EGFR 84 05/22/2023    K 3 4 (L) 05/22/2023        Trend:  No results found for: \"\"    "

## 2023-05-30 NOTE — LETTER
May 30, 2023     Tania Casillas DO [address]     Patient: Osman Harvey   YOB: 1990   Date of Visit: 5/30/2023       Dear Dr Sathya Dial:    Thank you for referring Ed Mckee to me for evaluation  Below are my notes for this consultation  If you have questions, please do not hesitate to call me  I look forward to following your patient along with you  Sincerely,        Gabbi Mcpherson MD        CC: No Recipients    Gabbi Mcpherson MD  5/30/2023  1:58 PM  Sign when Signing Visit  Assessment/Plan:    Problem List Items Addressed This Visit          Other    Pelvic mass     The patient has been recently diagnosed with a ovarian mass  We discussed with the patient   All new solid masses run approximately 10 to 20% risk of ovarian cancer  These generally do not resolve  We have discussed treatment options including observation with follow-up in 2 months including possible ultrasound at that time, or immediate surgery  The patient remains somewhat ambivalent regarding fertility  We have recommended the following:    Exploratory laparotomy left salpingo-oophorectomy right ovarian cystectomy with possible hysterectomy bilateral salpingo-oophorectomy with possible pelvic and para-aortic lymph node dissection staging biopsies including omentectomy based on findings at frozen section     The patient may elect to have OCHOA/BSO anyway as she is leaning toward no fertility in future and is having pain with her menses  Have discussed risks and benefits of the procedure including bleeding requiring transfusion infection, infection, damage to local structures including bowel bladder ureter and other local organs  We discussed the risk of deep venous thrombosis  An open procedure may be required  All of these complications are in the 2-4% range of likelihood  The patient understands the risks and benefits of the procedure and has signed an informed consent    I personally signed the consent form with her  She does understand that further treatment including chemotherapy radiation therapy or hormones may be required based on the final postoperative pathologic diagnosis and staging  Standard preoperative testing including type and screen is CBC CPM P chest x-ray and EKG will be ordered  Any appropriate consultations for preoperative evaluation will also be ordered  Overall consultation took 60 min with greater than 50% in dedicated toward discussion time  Relevant Orders    Case request operating room: LAPAROTOMY EXPLORATORY left salpingo-oophorectomy right ovarian cystectomy possible OCHOA/BSO staging (Completed)    Type and screen    Comprehensive metabolic panel    HEMOGLOBIN A1C W/ EAG ESTIMATION        XR chest pa & lateral           CHIEF COMPLAINT: Bilateral ovarian masses        Patient ID: Arturo Foy is a 27 y o  female  She is a very pleasant 77-year-old  0 seen in consultation from the emergency department regarding evaluation and management of abdominal masses  Patient was in her usual state of health when she began to develop abdominal bloating distention and pain over the course of the past 4 weeks  She describes her menstrual periods as lasting 5 to 7 days with moderate degree of flow and not enough pain to require consistent nonsteroidal pain medicines  These have been slowly worsening however over the years  She has no intermenstrual bleeding she has no bowel or bladder complaints she underwent CAT scan which reveals the following:  FINDINGS:     ABDOMEN     LOWER CHEST:  No clinically significant abnormality identified in the visualized lower chest      LIVER/BILIARY TREE:  Unremarkable  GALLBLADDER:  No calcified gallstones  No pericholecystic inflammatory change  SPLEEN:  Unremarkable  PANCREAS:  Unremarkable  ADRENAL GLANDS:  Unremarkable       KIDNEYS/URETERS: Moderate left hydroureteronephrosis due to a large complex cystic mass in the pelvis  See description below  Right kidney normal      STOMACH AND BOWEL:  Unremarkable  APPENDIX: A normal appendix was visualized  ABDOMINOPELVIC CAVITY: Free fluid in the pelvis  No free air or adenopathy  VESSELS:  Unremarkable for patient's age  PELVIS     REPRODUCTIVE ORGANS: There are 2 cystic masses in the pelvis the largest is in the midline likely arising from the right ovary measuring 15 7 x 11 0 cm with solid enhancing mural nodules measuring 1 7 cm on image 2/119 and 1 5 cm on image 2/128  Similar   appearing but smaller mass posteriorly likely from the right ovary measures 5 2 x 3 8 cm  URINARY BLADDER:  Unremarkable  ABDOMINAL WALL/INGUINAL REGIONS:  Unremarkable  OSSEOUS STRUCTURES:  No acute fracture or destructive osseous lesion  IMPRESSION:     Two cystic masses in the pelvis the largest is in the midline likely arising from the left ovary measuring 15 7 x 11 0 cm with solid enhancing mural nodules measuring 1 7 cm on image 2/119 and 1 5 cm on image 2/128  Similar appearing but smaller mass   posteriorly likely from the right ovary measures 5 2 x 3 8 cm  Findings could represent endometriomata or mucinous neoplasm  Recommend GYN surgery consultation  The large midline cystic pelvic mass results in moderate left hydroureteronephrosis  Today, the patient is doing well  She denies significant abdominal pain, pelvic pain, nausea, vomiting, constipation, diarrhea, fevers, chills, or vaginal bleeding  The patient is somewhat ambivalent regarding pregnancy  If a malignancy is identified she does not wish to maintain fertility and wishes to undergo standard of care surgical treatment  However if removal of uterus and ovaries could be avoided with the potential of fertility in the future the patient is interested in this  Review of Systems   Constitutional: Negative  HENT: Negative  Eyes: Negative      Respiratory: "Negative  Cardiovascular: Negative  Gastrointestinal: Positive for abdominal distention and abdominal pain  Endocrine: Negative  Genitourinary: Negative  Musculoskeletal: Negative  Skin: Negative  Neurological: Negative  Hematological: Negative  Psychiatric/Behavioral: Negative  No current outpatient medications on file  No current facility-administered medications for this visit  Allergies   Allergen Reactions   • Penicillins Itching and Rash     Rash  Rash         Past Medical History:   Diagnosis Date   • No known health problems        Past Surgical History:   Procedure Laterality Date   • LEG SURGERY Right        OB History          0    Para   0    Term   0       0    AB   0    Living   0         SAB   0    IAB   0    Ectopic   0    Multiple   0    Live Births   0                 Family History   Problem Relation Age of Onset   • Ovarian cysts Mother    • No Known Problems Father        The following portions of the patient's history were reviewed and updated as appropriate: allergies, current medications, past family history, past social history, past surgical history and problem list       Objective:    Blood pressure 132/80, pulse 67, temperature 97 9 °F (36 6 °C), temperature source Temporal, resp  rate 16, height 5' 7\" (1 702 m), weight 85 3 kg (188 lb), last menstrual period 05/15/2023, SpO2 99 %  Body mass index is 29 44 kg/m²  Physical Exam  Constitutional:       Appearance: She is well-developed  HENT:      Head: Normocephalic and atraumatic  Eyes:      Pupils: Pupils are equal, round, and reactive to light  Cardiovascular:      Rate and Rhythm: Normal rate and regular rhythm  Heart sounds: Normal heart sounds  Pulmonary:      Effort: Pulmonary effort is normal  No respiratory distress  Breath sounds: Normal breath sounds  Abdominal:      General: Bowel sounds are normal  There is no distension        Palpations: Abdomen " "is soft  Abdomen is not rigid  Tenderness: There is no abdominal tenderness  There is no guarding or rebound  Genitourinary:     Comments: -Normal external female genitalia, normal Bartholin's and [place]'s glands                  -Normal midline urethral meatus  No lesions notes                  -Bladder without fullness mass or tenderness                  -Vagina without lesion or discharge No significant cystocele or rectocele noted                  -Cervix normal appearing without visible lesions                  -Uterus with normal contour, mobility  No tenderness,                  -Adnexae with large mass approximately 20 x 10 cm in the midline and extending to the bilateral sidewalls up to the level of the umbilicus  There is no extraovarian masses or nodularity  No tenderness noted  - Anus without fissure of lesion    Musculoskeletal:         General: Normal range of motion  Cervical back: Normal range of motion and neck supple  Lymphadenopathy:      Cervical: No cervical adenopathy  Upper Body:      Right upper body: No supraclavicular adenopathy  Left upper body: No supraclavicular adenopathy  Skin:     General: Skin is warm and dry  Neurological:      Mental Status: She is alert and oriented to person, place, and time     Psychiatric:         Behavior: Behavior normal            No results found for: \"\"  Lab Results   Component Value Date    HCT 40 9 05/22/2023    HGB 12 8 05/22/2023    MCV 92 05/22/2023     05/22/2023    WBC 9 35 05/22/2023     Lab Results   Component Value Date    ALKPHOS 52 05/22/2023    ALT 11 05/22/2023    AST 13 05/22/2023    BUN 14 05/22/2023    CALCIUM 8 7 05/22/2023     05/22/2023    CO2 25 05/22/2023    CREATININE 0 89 05/22/2023    EGFR 84 05/22/2023    K 3 4 (L) 05/22/2023        Trend:  No results found for: \"\"    "

## 2023-05-31 ENCOUNTER — OFFICE VISIT (OUTPATIENT)
Dept: UROLOGY | Facility: CLINIC | Age: 33
End: 2023-05-31

## 2023-05-31 ENCOUNTER — TELEPHONE (OUTPATIENT)
Dept: HEMATOLOGY ONCOLOGY | Facility: CLINIC | Age: 33
End: 2023-05-31

## 2023-05-31 VITALS
OXYGEN SATURATION: 98 % | HEART RATE: 79 BPM | WEIGHT: 189 LBS | SYSTOLIC BLOOD PRESSURE: 124 MMHG | TEMPERATURE: 98.2 F | DIASTOLIC BLOOD PRESSURE: 82 MMHG | HEIGHT: 67 IN | BODY MASS INDEX: 29.66 KG/M2

## 2023-05-31 DIAGNOSIS — N13.30 HYDRONEPHROSIS, UNSPECIFIED HYDRONEPHROSIS TYPE: Primary | ICD-10-CM

## 2023-05-31 LAB
SL AMB  POCT GLUCOSE, UA: NORMAL
SL AMB LEUKOCYTE ESTERASE,UA: NORMAL
SL AMB POCT BILIRUBIN,UA: NORMAL
SL AMB POCT BLOOD,UA: NORMAL
SL AMB POCT CLARITY,UA: CLEAR
SL AMB POCT COLOR,UA: YELLOW
SL AMB POCT KETONES,UA: NORMAL
SL AMB POCT NITRITE,UA: NORMAL
SL AMB POCT PH,UA: 7.5
SL AMB POCT SPECIFIC GRAVITY,UA: 1.01
SL AMB POCT URINE PROTEIN: NORMAL
SL AMB POCT UROBILINOGEN: 0.2

## 2023-05-31 RX ORDER — LEVOFLOXACIN 5 MG/ML
500 INJECTION, SOLUTION INTRAVENOUS ONCE
OUTPATIENT
Start: 2023-05-31 | End: 2023-05-31

## 2023-05-31 NOTE — PROGRESS NOTES
UROLOGY PROGRESS NOTE         NAME: Jennifer Hale  AGE: 27 y o  SEX: female  : 1990   MRN: [id number]    DATE: 2023  TIME: 10:45 AM    Assessment and Plan      Impression:   1  Hydronephrosis, unspecified hydronephrosis type  -     POCT urine dip auto non-scope  -     Case request operating room: CYSTOSCOPY with left ureteral stent placement and left retrograde pyelogram; Standing  -     Case request operating room: CYSTOSCOPY with left ureteral stent placement and left retrograde pyelogram         Plan: Discussed etiology of left hydronephrosis and options  Has a large ovarian mass/cyst as apparent source for left hydro  Would recommend bilateral ureteral stents for upcoming gynecologic surgery for intraoperative ureteral localization  These could be placed immediately prior to her procedure under the same anesthetic  Would recommend that the left stent be an indwelling double J ureteral stent that could remain in place for an extended period of time such as 3 to 6 months if patient requires additional treatment gynecologically  The other option would be to place the left ureteral stent sooner  At this point surgery is not scheduled till mid July  It would be prudent to move ahead and place a left ureteral stent sooner  I discussed this with her and she is agreeable  There is no obstruction on the right side  Placement of bilateral stents at this point could result in more morbidity  A right ureteral stent would be helpful also at the time of her procedure for intraoperative localization of the ureter  My suggestion would be that a right ureteral stent be placed immediately prior to her gynecologic surgery under the same anesthetic as well  Thus, she would have bilateral stents for intraoperative ureteral localization at the time of her gynecologic surgery      The cystoscopy with left ureteral stent procedure along with its potential risks imitations outcomes and alternatives "were reviewed with her in detail  We did discuss the backup of a percutaneous nephrostomy  Informed consent was obtained  Chief Complaint   No chief complaint on file  History of Present Illness     HPI: Ernesto Mendiola is a 27y o  year old female who presents with a large ovarian mass which appears to be resulting in left hydronephrosis  Patient has seen gynecology and a case request was placed for exploratory laparotomy and appropriate gynecologic interventions which depend on inspection pathology at the time and intraoperative findings  A consult was also placed by the gynecologist to gynecologic oncology  The following portions of the patient's history were reviewed and updated as appropriate: allergies, current medications, past family history, past medical history, past social history, past surgical history and problem list   Past Medical History:   Diagnosis Date   • No known health problems      Past Surgical History:   Procedure Laterality Date   • LEG SURGERY Right      shoulder  Review of Systems     Const: Denies chills, fever and weight loss  CV: Denies chest pain  Resp: Denies SOB  GI: Denies abdominal pain, nausea and vomiting  : Denies symptoms other than stated above  Musculo: Denies back pain  Objective   /82 (BP Location: Left arm, Patient Position: Sitting, Cuff Size: Adult)   Pulse 79   Temp 98 2 °F (36 8 °C)   Ht 5' 7\" (1 702 m)   Wt 85 7 kg (189 lb)   LMP 05/15/2023   SpO2 98%   BMI 29 60 kg/m²     Physical Exam  Const: Appears healthy and well developed  No signs of acute distress present  Resp: Respirations are regular and unlabored  CV: Rate is regular  Rhythm is regular  Abdomen: Abdomen is soft, nontender, and nondistended  Kidneys are not palpable  : Pelvic mass noted  This extends well up into the abdomen as well  Protuberant mildly tender  No flank tenderness  Psych: Patient's attitude is cooperative   Mood is normal  Affect is " normal     Procedure   Procedures     Current Medications   No current outpatient medications on file          aMndeep Harley MD

## 2023-05-31 NOTE — H&P
UROLOGY PROGRESS NOTE         NAME: Jennifer Hale  AGE: 27 y o  SEX: female  : 1990   MRN: [id number]    DATE: 2023  TIME: 10:45 AM    Assessment and Plan      Impression:   1  Hydronephrosis, unspecified hydronephrosis type  -     POCT urine dip auto non-scope  -     Case request operating room: CYSTOSCOPY with left ureteral stent placement and left retrograde pyelogram; Standing  -     Case request operating room: CYSTOSCOPY with left ureteral stent placement and left retrograde pyelogram         Plan: Discussed etiology of left hydronephrosis and options  Has a large ovarian mass/cyst as apparent source for left hydro  Would recommend bilateral ureteral stents for upcoming gynecologic surgery for intraoperative ureteral localization  These could be placed immediately prior to her procedure under the same anesthetic  Would recommend that the left stent be an indwelling double J ureteral stent that could remain in place for an extended period of time such as 3 to 6 months if patient requires additional treatment gynecologically  The other option would be to place the left ureteral stent sooner  At this point surgery is not scheduled till mid July  It would be prudent to move ahead and place a left ureteral stent sooner  I discussed this with her and she is agreeable  There is no obstruction on the right side  Placement of bilateral stents at this point could result in more morbidity  A right ureteral stent would be helpful also at the time of her procedure for intraoperative localization of the ureter  My suggestion would be that a right ureteral stent be placed immediately prior to her gynecologic surgery under the same anesthetic as well  Thus, she would have bilateral stents for intraoperative ureteral localization at the time of her gynecologic surgery      The cystoscopy with left ureteral stent procedure along with its potential risks imitations outcomes and alternatives "were reviewed with her in detail  We did discuss the backup of a percutaneous nephrostomy  Informed consent was obtained  Chief Complaint   No chief complaint on file  History of Present Illness     HPI: Ernesto Mendiola is a 27y o  year old female who presents with a large ovarian mass which appears to be resulting in left hydronephrosis  Patient has seen gynecology and a case request was placed for exploratory laparotomy and appropriate gynecologic interventions which depend on inspection pathology at the time and intraoperative findings  A consult was also placed by the gynecologist to gynecologic oncology  The following portions of the patient's history were reviewed and updated as appropriate: allergies, current medications, past family history, past medical history, past social history, past surgical history and problem list   Past Medical History:   Diagnosis Date   • No known health problems      Past Surgical History:   Procedure Laterality Date   • LEG SURGERY Right      shoulder  Review of Systems     Const: Denies chills, fever and weight loss  CV: Denies chest pain  Resp: Denies SOB  GI: Denies abdominal pain, nausea and vomiting  : Denies symptoms other than stated above  Musculo: Denies back pain  Objective   /82 (BP Location: Left arm, Patient Position: Sitting, Cuff Size: Adult)   Pulse 79   Temp 98 2 °F (36 8 °C)   Ht 5' 7\" (1 702 m)   Wt 85 7 kg (189 lb)   LMP 05/15/2023   SpO2 98%   BMI 29 60 kg/m²     Physical Exam  Const: Appears healthy and well developed  No signs of acute distress present  Resp: Respirations are regular and unlabored  CV: Rate is regular  Rhythm is regular  Abdomen: Abdomen is soft, nontender, and nondistended  Kidneys are not palpable  : Pelvic mass noted  This extends well up into the abdomen as well  Protuberant mildly tender  No flank tenderness  Psych: Patient's attitude is cooperative   Mood is normal  Affect is " normal     Procedure   Procedures     Current Medications   No current outpatient medications on file          Rhonda Ann MD

## 2023-05-31 NOTE — H&P (VIEW-ONLY)
UROLOGY PROGRESS NOTE         NAME: Jennifer Hale  AGE: 27 y o  SEX: female  : 1990   MRN: [id number]    DATE: 2023  TIME: 10:45 AM    Assessment and Plan      Impression:   1  Hydronephrosis, unspecified hydronephrosis type  -     POCT urine dip auto non-scope  -     Case request operating room: CYSTOSCOPY with left ureteral stent placement and left retrograde pyelogram; Standing  -     Case request operating room: CYSTOSCOPY with left ureteral stent placement and left retrograde pyelogram         Plan: Discussed etiology of left hydronephrosis and options  Has a large ovarian mass/cyst as apparent source for left hydro  Would recommend bilateral ureteral stents for upcoming gynecologic surgery for intraoperative ureteral localization  These could be placed immediately prior to her procedure under the same anesthetic  Would recommend that the left stent be an indwelling double J ureteral stent that could remain in place for an extended period of time such as 3 to 6 months if patient requires additional treatment gynecologically  The other option would be to place the left ureteral stent sooner  At this point surgery is not scheduled till mid July  It would be prudent to move ahead and place a left ureteral stent sooner  I discussed this with her and she is agreeable  There is no obstruction on the right side  Placement of bilateral stents at this point could result in more morbidity  A right ureteral stent would be helpful also at the time of her procedure for intraoperative localization of the ureter  My suggestion would be that a right ureteral stent be placed immediately prior to her gynecologic surgery under the same anesthetic as well  Thus, she would have bilateral stents for intraoperative ureteral localization at the time of her gynecologic surgery      The cystoscopy with left ureteral stent procedure along with its potential risks imitations outcomes and alternatives "were reviewed with her in detail  We did discuss the backup of a percutaneous nephrostomy  Informed consent was obtained  Chief Complaint   No chief complaint on file  History of Present Illness     HPI: Velma Bobby is a 27y o  year old female who presents with a large ovarian mass which appears to be resulting in left hydronephrosis  Patient has seen gynecology and a case request was placed for exploratory laparotomy and appropriate gynecologic interventions which depend on inspection pathology at the time and intraoperative findings  A consult was also placed by the gynecologist to gynecologic oncology  The following portions of the patient's history were reviewed and updated as appropriate: allergies, current medications, past family history, past medical history, past social history, past surgical history and problem list   Past Medical History:   Diagnosis Date   • No known health problems      Past Surgical History:   Procedure Laterality Date   • LEG SURGERY Right      shoulder  Review of Systems     Const: Denies chills, fever and weight loss  CV: Denies chest pain  Resp: Denies SOB  GI: Denies abdominal pain, nausea and vomiting  : Denies symptoms other than stated above  Musculo: Denies back pain  Objective   /82 (BP Location: Left arm, Patient Position: Sitting, Cuff Size: Adult)   Pulse 79   Temp 98 2 °F (36 8 °C)   Ht 5' 7\" (1 702 m)   Wt 85 7 kg (189 lb)   LMP 05/15/2023   SpO2 98%   BMI 29 60 kg/m²     Physical Exam  Const: Appears healthy and well developed  No signs of acute distress present  Resp: Respirations are regular and unlabored  CV: Rate is regular  Rhythm is regular  Abdomen: Abdomen is soft, nontender, and nondistended  Kidneys are not palpable  : Pelvic mass noted  This extends well up into the abdomen as well  Protuberant mildly tender  No flank tenderness  Psych: Patient's attitude is cooperative   Mood is normal  Affect is " normal     Procedure   Procedures     Current Medications   No current outpatient medications on file          Tomi Monroe MD

## 2023-06-02 ENCOUNTER — TELEPHONE (OUTPATIENT)
Dept: GYNECOLOGIC ONCOLOGY | Facility: CLINIC | Age: 33
End: 2023-06-02

## 2023-06-02 ENCOUNTER — TELEPHONE (OUTPATIENT)
Dept: HEMATOLOGY ONCOLOGY | Facility: CLINIC | Age: 33
End: 2023-06-02

## 2023-06-02 ENCOUNTER — HOSPITAL ENCOUNTER (OUTPATIENT)
Dept: RADIOLOGY | Facility: CLINIC | Age: 33
End: 2023-06-02
Payer: COMMERCIAL

## 2023-06-02 ENCOUNTER — APPOINTMENT (OUTPATIENT)
Dept: LAB | Facility: CLINIC | Age: 33
End: 2023-06-02
Payer: COMMERCIAL

## 2023-06-02 DIAGNOSIS — R19.00 PELVIC MASS: ICD-10-CM

## 2023-06-02 LAB
ALBUMIN SERPL BCP-MCNC: 3.3 G/DL (ref 3.5–5)
ALP SERPL-CCNC: 73 U/L (ref 46–116)
ALT SERPL W P-5'-P-CCNC: 19 U/L (ref 12–78)
ANION GAP SERPL CALCULATED.3IONS-SCNC: 6 MMOL/L (ref 4–13)
AST SERPL W P-5'-P-CCNC: 19 U/L (ref 5–45)
BILIRUB SERPL-MCNC: 0.65 MG/DL (ref 0.2–1)
BUN SERPL-MCNC: 10 MG/DL (ref 5–25)
CALCIUM ALBUM COR SERPL-MCNC: 10 MG/DL (ref 8.3–10.1)
CALCIUM SERPL-MCNC: 9.4 MG/DL (ref 8.3–10.1)
CANCER AG125 SERPL-ACNC: 214.4 U/ML (ref 0–35)
CHLORIDE SERPL-SCNC: 105 MMOL/L (ref 96–108)
CO2 SERPL-SCNC: 23 MMOL/L (ref 21–32)
CREAT SERPL-MCNC: 0.92 MG/DL (ref 0.6–1.3)
EST. AVERAGE GLUCOSE BLD GHB EST-MCNC: 91 MG/DL
GFR SERPL CREATININE-BSD FRML MDRD: 81 ML/MIN/1.73SQ M
GLUCOSE SERPL-MCNC: 86 MG/DL (ref 65–140)
HBA1C MFR BLD: 4.8 %
POTASSIUM SERPL-SCNC: 4.3 MMOL/L (ref 3.5–5.3)
PROT SERPL-MCNC: 8 G/DL (ref 6.4–8.4)
SODIUM SERPL-SCNC: 134 MMOL/L (ref 135–147)

## 2023-06-02 PROCEDURE — 86304 IMMUNOASSAY TUMOR CA 125: CPT

## 2023-06-02 PROCEDURE — 71046 X-RAY EXAM CHEST 2 VIEWS: CPT

## 2023-06-02 PROCEDURE — 83036 HEMOGLOBIN GLYCOSYLATED A1C: CPT

## 2023-06-02 PROCEDURE — 36415 COLL VENOUS BLD VENIPUNCTURE: CPT

## 2023-06-02 PROCEDURE — 80053 COMPREHEN METABOLIC PANEL: CPT

## 2023-06-02 PROCEDURE — 86900 BLOOD TYPING SEROLOGIC ABO: CPT | Performed by: OBSTETRICS & GYNECOLOGY

## 2023-06-02 PROCEDURE — 86850 RBC ANTIBODY SCREEN: CPT | Performed by: OBSTETRICS & GYNECOLOGY

## 2023-06-02 PROCEDURE — 86901 BLOOD TYPING SEROLOGIC RH(D): CPT | Performed by: OBSTETRICS & GYNECOLOGY

## 2023-06-02 NOTE — TELEPHONE ENCOUNTER
Received Paperwork   What type of form FMLA   Scanned blank form into patient's Epic chart YES   Method received form Fax   Provider responsible for form Peterson mai   Informed patient our office turn around time for completing patient forms is 5-7 business days  Yes via [employer] message   Comments       Paperwork Update   What type of form FMLA   Method of returning completed form Mychart, FAX   FMLA/Disability requested start date 05/30/2023   Date need completed form(s) by    Intermittent or Continuous FMLA?  Continuous   If returning via fax, enter fax number [phone]   Additional Comments

## 2023-06-02 NOTE — TELEPHONE ENCOUNTER
Patient Call    Who are you speaking with? Julia from [employer]     If it is not the patient, are they listed on an active communication consent form? N/A   What is the reason for this call? Julia calling for clarification on forms that were faxed to  for this patient    Does this require a call back? Yes   If a call back is required, please list [facility] call back number [phone]   If a call back is required, advise that a message will be forwarded to their care team and someone will return their call as soon as possible  Did you relay this information to the patient?  Yes

## 2023-06-02 NOTE — TELEPHONE ENCOUNTER
Called and spoke to  from [employer] where patient is employed  She had a question about the paperwork  All questions were answered and paperwork was fixed where needed and refaxed to employer HR

## 2023-06-03 ENCOUNTER — LAB REQUISITION (OUTPATIENT)
Dept: LAB | Facility: HOSPITAL | Age: 33
End: 2023-06-03
Payer: COMMERCIAL

## 2023-06-03 DIAGNOSIS — R19.00 INTRA-ABDOMINAL AND PELVIC SWELLING, MASS AND LUMP, UNSPECIFIED SITE: ICD-10-CM

## 2023-06-03 LAB
ABO GROUP BLD: NORMAL
BLD GP AB SCN SERPL QL: NEGATIVE
RH BLD: POSITIVE
SPECIMEN EXPIRATION DATE: NORMAL

## 2023-06-08 NOTE — PRE-PROCEDURE INSTRUCTIONS
No outpatient medications have been marked as taking for the 6/13/23 encounter [facility] Encounter)  Medication instructions for day surgery reviewed  Please use only a sip of water to take your instructed medications  Avoid all over the counter vitamins, supplements and NSAIDS for one week prior to surgery per anesthesia guidelines  Tylenol is ok to take as needed  You will receive a call one business day prior to surgery with an arrival time and hospital directions  If your surgery is scheduled on a Monday, the hospital will be calling you on the Friday prior to your surgery  If you have not heard from anyone by 8pm, please call the hospital supervisor through the hospital  at [phone]  Mary Jurado [phone])  Do not eat or drink anything after midnight the night before your surgery, including candy, mints, lifesavers, or chewing gum  Do not drink alcohol 24hrs before your surgery  Try not to smoke at least 24hrs before your surgery  Follow the pre surgery showering instructions as listed in the [facility] Surgical Experience Booklet” or otherwise provided by your surgeon's office  Do not shave the surgical area 24 hours before surgery  Do not apply any lotions, creams, including makeup, cologne, deodorant, or perfumes after showering on the day of your surgery  No contact lenses, eye make-up, or artificial eyelashes  Remove nail polish, including gel polish, and any artificial, gel, or acrylic nails if possible  Remove all jewelry including rings and body piercing jewelry  Wear causal clothing that is easy to take on and off  Consider your type of surgery  Keep any valuables, jewelry, piercings at home  Please bring any specially ordered equipment (sling, braces) if indicated  Arrange for a responsible person to drive you to and from the hospital on the day of your surgery  Visitor Guidelines discussed       Call the surgeon's office with any new illnesses, exposures, or additional questions prior to surgery  Please reference your [facility] Surgical Experience Booklet” for additional information to prepare for your upcoming surgery

## 2023-06-12 ENCOUNTER — ANESTHESIA EVENT (OUTPATIENT)
Dept: PERIOP | Facility: HOSPITAL | Age: 33
DRG: 737 | End: 2023-06-12
Payer: COMMERCIAL

## 2023-06-12 NOTE — ANESTHESIA PREPROCEDURE EVALUATION
Procedure:  CYSTO W/ INSERTION STENT URETERAL (Bilateral: Ureter)  LAPAROTOMY EXPLORATORY (Abdomen)  LEFT SALPINGO-OOPHORECTOMY, RIGHT OVARIAN CYSTECTOMY (Abdomen)  HYSTERECTOMY TOTAL ABDOMINAL (OCHOA), BILATERAL SALPINGO OOPHORECTOMY, STAGING (Abdomen)    Relevant Problems   ANESTHESIA   (+) PONV (postoperative nausea and vomiting)      /RENAL   (+) Hydronephrosis      Other   (+) Pelvic mass        Physical Exam    Airway    Mallampati score: II  TM Distance: >3 FB       Dental   No notable dental hx     Cardiovascular  Cardiovascular exam normal    Pulmonary  Pulmonary exam normal     Other Findings  B/L ear piercing in situ      Anesthesia Plan  ASA Score- 2     Anesthesia Type- general with ASA Monitors  Additional Monitors:   Airway Plan: ETT  Comment: GETA + epidural (perferred)  vs TAPS  Heparin held  Scop patch ordered, 2nd PIV discussed  Plan Factors-Exercise tolerance (METS): >4 METS  Chart reviewed  Existing labs reviewed  Patient is not a current smoker  Obstructive sleep apnea risk education given perioperatively  Induction- intravenous  Postoperative Plan- Plan for postoperative opioid use  Informed Consent- Anesthetic plan and risks discussed with patient

## 2023-06-13 ENCOUNTER — ANESTHESIA (OUTPATIENT)
Dept: PERIOP | Facility: HOSPITAL | Age: 33
DRG: 737 | End: 2023-06-13
Payer: COMMERCIAL

## 2023-06-13 ENCOUNTER — HOSPITAL ENCOUNTER (INPATIENT)
Facility: HOSPITAL | Age: 33
LOS: 3 days | Discharge: HOME | DRG: 737 | End: 2023-06-16
Attending: UROLOGY | Admitting: OBSTETRICS & GYNECOLOGY
Payer: COMMERCIAL

## 2023-06-13 DIAGNOSIS — Z90.710 STATUS POST TOTAL ABDOMINAL HYSTERECTOMY AND BILATERAL SALPINGO-OOPHORECTOMY (TAH-BSO): ICD-10-CM

## 2023-06-13 DIAGNOSIS — G89.18 POSTOPERATIVE PAIN: Primary | ICD-10-CM

## 2023-06-13 DIAGNOSIS — R19.00 PELVIC MASS: ICD-10-CM

## 2023-06-13 DIAGNOSIS — Z90.79 STATUS POST TOTAL ABDOMINAL HYSTERECTOMY AND BILATERAL SALPINGO-OOPHORECTOMY (TAH-BSO): ICD-10-CM

## 2023-06-13 DIAGNOSIS — Z90.722 STATUS POST TOTAL ABDOMINAL HYSTERECTOMY AND BILATERAL SALPINGO-OOPHORECTOMY (TAH-BSO): ICD-10-CM

## 2023-06-13 LAB
ABO GROUP BLD: NORMAL
EXT PREGNANCY TEST URINE: NEGATIVE
EXT. CONTROL: NORMAL
RH BLD: POSITIVE

## 2023-06-13 PROCEDURE — 0UT70ZZ RESECTION OF BILATERAL FALLOPIAN TUBES, OPEN APPROACH: ICD-10-PCS | Performed by: UROLOGY

## 2023-06-13 PROCEDURE — 52005 CYSTO W/URTRL CATHJ: CPT | Performed by: UROLOGY

## 2023-06-13 PROCEDURE — 81025 URINE PREGNANCY TEST: CPT | Performed by: OBSTETRICS & GYNECOLOGY

## 2023-06-13 PROCEDURE — 0DBN0ZX EXCISION OF SIGMOID COLON, OPEN APPROACH, DIAGNOSTIC: ICD-10-PCS | Performed by: UROLOGY

## 2023-06-13 PROCEDURE — 0WBH0ZX EXCISION OF RETROPERITONEUM, OPEN APPROACH, DIAGNOSTIC: ICD-10-PCS | Performed by: UROLOGY

## 2023-06-13 PROCEDURE — 0TBB0ZX EXCISION OF BLADDER, OPEN APPROACH, DIAGNOSTIC: ICD-10-PCS | Performed by: UROLOGY

## 2023-06-13 PROCEDURE — C1758 CATHETER, URETERAL: HCPCS | Performed by: UROLOGY

## 2023-06-13 PROCEDURE — 0UT20ZZ RESECTION OF BILATERAL OVARIES, OPEN APPROACH: ICD-10-PCS | Performed by: UROLOGY

## 2023-06-13 PROCEDURE — 0DTJ0ZZ RESECTION OF APPENDIX, OPEN APPROACH: ICD-10-PCS | Performed by: UROLOGY

## 2023-06-13 PROCEDURE — 0BBT0ZX EXCISION OF DIAPHRAGM, OPEN APPROACH, DIAGNOSTIC: ICD-10-PCS | Performed by: UROLOGY

## 2023-06-13 PROCEDURE — 0T784DZ DILATION OF BILATERAL URETERS WITH INTRALUMINAL DEVICE, PERCUTANEOUS ENDOSCOPIC APPROACH: ICD-10-PCS | Performed by: UROLOGY

## 2023-06-13 PROCEDURE — 07BC0ZX EXCISION OF PELVIS LYMPHATIC, OPEN APPROACH, DIAGNOSTIC: ICD-10-PCS | Performed by: UROLOGY

## 2023-06-13 PROCEDURE — 0DBU0ZZ EXCISION OF OMENTUM, OPEN APPROACH: ICD-10-PCS | Performed by: UROLOGY

## 2023-06-13 PROCEDURE — 0UT90ZZ RESECTION OF UTERUS, OPEN APPROACH: ICD-10-PCS | Performed by: UROLOGY

## 2023-06-13 RX ORDER — ONDANSETRON 2 MG/ML
4 INJECTION INTRAMUSCULAR; INTRAVENOUS EVERY 6 HOURS PRN
Status: DISCONTINUED | OUTPATIENT
Start: 2023-06-13 | End: 2023-06-16 | Stop reason: HOSPADM

## 2023-06-13 RX ORDER — HEPARIN SODIUM 5000 [USP'U]/ML
5000 INJECTION, SOLUTION INTRAVENOUS; SUBCUTANEOUS EVERY 8 HOURS SCHEDULED
Status: COMPLETED | OUTPATIENT
Start: 2023-06-13 | End: 2023-06-14

## 2023-06-13 RX ORDER — HYDROMORPHONE HCL/PF 1 MG/ML
0.5 SYRINGE (ML) INJECTION EVERY 2 HOUR PRN
Status: DISCONTINUED | OUTPATIENT
Start: 2023-06-13 | End: 2023-06-14

## 2023-06-13 RX ORDER — MELATONIN
5000 DAILY
COMMUNITY

## 2023-06-13 RX ORDER — ONDANSETRON 2 MG/ML
INJECTION INTRAMUSCULAR; INTRAVENOUS AS NEEDED
Status: DISCONTINUED | OUTPATIENT
Start: 2023-06-13 | End: 2023-06-13

## 2023-06-13 RX ORDER — SCOLOPAMINE TRANSDERMAL SYSTEM 1 MG/1
1 PATCH, EXTENDED RELEASE TRANSDERMAL
Status: DISCONTINUED | OUTPATIENT
Start: 2023-06-13 | End: 2023-06-16 | Stop reason: HOSPADM

## 2023-06-13 RX ORDER — ROCURONIUM BROMIDE 10 MG/ML
INJECTION, SOLUTION INTRAVENOUS AS NEEDED
Status: DISCONTINUED | OUTPATIENT
Start: 2023-06-13 | End: 2023-06-13

## 2023-06-13 RX ORDER — HYDROMORPHONE HCL/PF 1 MG/ML
0.5 SYRINGE (ML) INJECTION
Status: DISCONTINUED | OUTPATIENT
Start: 2023-06-13 | End: 2023-06-13 | Stop reason: HOSPADM

## 2023-06-13 RX ORDER — LORAZEPAM 2 MG/ML
1 INJECTION INTRAMUSCULAR EVERY 6 HOURS PRN
Status: CANCELLED | OUTPATIENT
Start: 2023-06-13 | End: 2023-06-16

## 2023-06-13 RX ORDER — SODIUM CHLORIDE 9 MG/ML
125 INJECTION, SOLUTION INTRAVENOUS CONTINUOUS
Status: DISCONTINUED | OUTPATIENT
Start: 2023-06-13 | End: 2023-06-15

## 2023-06-13 RX ORDER — ONDANSETRON 2 MG/ML
4 INJECTION INTRAMUSCULAR; INTRAVENOUS ONCE AS NEEDED
Status: COMPLETED | OUTPATIENT
Start: 2023-06-13 | End: 2023-06-13

## 2023-06-13 RX ORDER — PROPOFOL 10 MG/ML
INJECTION, EMULSION INTRAVENOUS AS NEEDED
Status: DISCONTINUED | OUTPATIENT
Start: 2023-06-13 | End: 2023-06-13

## 2023-06-13 RX ORDER — FENTANYL CITRATE 50 UG/ML
INJECTION, SOLUTION INTRAMUSCULAR; INTRAVENOUS AS NEEDED
Status: DISCONTINUED | OUTPATIENT
Start: 2023-06-13 | End: 2023-06-13

## 2023-06-13 RX ORDER — ACETAMINOPHEN 325 MG/1
975 TABLET ORAL ONCE
Status: COMPLETED | OUTPATIENT
Start: 2023-06-13 | End: 2023-06-13

## 2023-06-13 RX ORDER — CELECOXIB 200 MG/1
200 CAPSULE ORAL ONCE
Status: COMPLETED | OUTPATIENT
Start: 2023-06-13 | End: 2023-06-13

## 2023-06-13 RX ORDER — MAGNESIUM HYDROXIDE 1200 MG/15ML
LIQUID ORAL AS NEEDED
Status: DISCONTINUED | OUTPATIENT
Start: 2023-06-13 | End: 2023-06-13 | Stop reason: HOSPADM

## 2023-06-13 RX ORDER — LIDOCAINE HYDROCHLORIDE 20 MG/ML
INJECTION, SOLUTION EPIDURAL; INFILTRATION; INTRACAUDAL; PERINEURAL AS NEEDED
Status: DISCONTINUED | OUTPATIENT
Start: 2023-06-13 | End: 2023-06-13

## 2023-06-13 RX ORDER — CEFAZOLIN SODIUM 2 G/50ML
2000 SOLUTION INTRAVENOUS ONCE
Status: COMPLETED | OUTPATIENT
Start: 2023-06-13 | End: 2023-06-13

## 2023-06-13 RX ORDER — SODIUM CHLORIDE, SODIUM LACTATE, POTASSIUM CHLORIDE, CALCIUM CHLORIDE 600; 310; 30; 20 MG/100ML; MG/100ML; MG/100ML; MG/100ML
INJECTION, SOLUTION INTRAVENOUS CONTINUOUS PRN
Status: DISCONTINUED | OUTPATIENT
Start: 2023-06-13 | End: 2023-06-13

## 2023-06-13 RX ORDER — OXYCODONE HYDROCHLORIDE 5 MG/1
TABLET ORAL
Qty: 20 TABLET | Refills: 0 | Status: SHIPPED | OUTPATIENT
Start: 2023-06-13

## 2023-06-13 RX ORDER — MULTIVITAMIN
1 TABLET ORAL DAILY
COMMUNITY

## 2023-06-13 RX ORDER — DEXAMETHASONE SODIUM PHOSPHATE 10 MG/ML
INJECTION, SOLUTION INTRAMUSCULAR; INTRAVENOUS AS NEEDED
Status: DISCONTINUED | OUTPATIENT
Start: 2023-06-13 | End: 2023-06-13

## 2023-06-13 RX ORDER — LIDOCAINE HYDROCHLORIDE AND EPINEPHRINE 20; 5 MG/ML; UG/ML
INJECTION, SOLUTION EPIDURAL; INFILTRATION; INTRACAUDAL; PERINEURAL AS NEEDED
Status: DISCONTINUED | OUTPATIENT
Start: 2023-06-13 | End: 2023-06-13

## 2023-06-13 RX ORDER — OXYCODONE HYDROCHLORIDE AND ACETAMINOPHEN 5; 325 MG/1; MG/1
1 TABLET ORAL EVERY 4 HOURS PRN
Status: DISCONTINUED | OUTPATIENT
Start: 2023-06-13 | End: 2023-06-14

## 2023-06-13 RX ORDER — HYDROMORPHONE HYDROCHLORIDE 1 MG/ML
INJECTION, SOLUTION INTRAMUSCULAR; INTRAVENOUS; SUBCUTANEOUS AS NEEDED
Status: DISCONTINUED | OUTPATIENT
Start: 2023-06-13 | End: 2023-06-13

## 2023-06-13 RX ORDER — SODIUM CHLORIDE 9 MG/ML
125 INJECTION, SOLUTION INTRAVENOUS CONTINUOUS
Status: DISCONTINUED | OUTPATIENT
Start: 2023-06-13 | End: 2023-06-16 | Stop reason: HOSPADM

## 2023-06-13 RX ORDER — FENTANYL CITRATE 50 UG/ML
INJECTION, SOLUTION INTRAMUSCULAR; INTRAVENOUS
Status: COMPLETED | OUTPATIENT
Start: 2023-06-13 | End: 2023-06-13

## 2023-06-13 RX ORDER — DOCUSATE SODIUM 100 MG/1
100 CAPSULE, LIQUID FILLED ORAL 2 TIMES DAILY
Status: DISCONTINUED | OUTPATIENT
Start: 2023-06-13 | End: 2023-06-16 | Stop reason: HOSPADM

## 2023-06-13 RX ORDER — FENTANYL CITRATE/PF 50 MCG/ML
50 SYRINGE (ML) INJECTION
Status: DISCONTINUED | OUTPATIENT
Start: 2023-06-13 | End: 2023-06-13 | Stop reason: HOSPADM

## 2023-06-13 RX ORDER — MIDAZOLAM HYDROCHLORIDE 2 MG/2ML
INJECTION, SOLUTION INTRAMUSCULAR; INTRAVENOUS
Status: COMPLETED | OUTPATIENT
Start: 2023-06-13 | End: 2023-06-13

## 2023-06-13 RX ORDER — HEPARIN SODIUM 5000 [USP'U]/ML
5000 INJECTION, SOLUTION INTRAVENOUS; SUBCUTANEOUS
Status: COMPLETED | OUTPATIENT
Start: 2023-06-13 | End: 2023-06-13

## 2023-06-13 RX ORDER — GABAPENTIN 100 MG/1
100 CAPSULE ORAL ONCE
Status: COMPLETED | OUTPATIENT
Start: 2023-06-13 | End: 2023-06-13

## 2023-06-13 RX ADMIN — MIDAZOLAM 2 MG: 1 INJECTION INTRAMUSCULAR; INTRAVENOUS at 07:35

## 2023-06-13 RX ADMIN — ACETAMINOPHEN 975 MG: 325 TABLET ORAL at 05:43

## 2023-06-13 RX ADMIN — FENTANYL CITRATE 50 MCG: 50 INJECTION, SOLUTION INTRAMUSCULAR; INTRAVENOUS at 09:06

## 2023-06-13 RX ADMIN — SUGAMMADEX 160 MG: 100 INJECTION, SOLUTION INTRAVENOUS at 11:44

## 2023-06-13 RX ADMIN — FENTANYL CITRATE 100 MCG: 50 INJECTION, SOLUTION INTRAMUSCULAR; INTRAVENOUS at 07:35

## 2023-06-13 RX ADMIN — PROPOFOL 200 MG: 10 INJECTION, EMULSION INTRAVENOUS at 08:13

## 2023-06-13 RX ADMIN — HEPARIN SODIUM 5000 UNITS: 5000 INJECTION INTRAVENOUS; SUBCUTANEOUS at 09:24

## 2023-06-13 RX ADMIN — SODIUM CHLORIDE, SODIUM LACTATE, POTASSIUM CHLORIDE, AND CALCIUM CHLORIDE: .6; .31; .03; .02 INJECTION, SOLUTION INTRAVENOUS at 09:47

## 2023-06-13 RX ADMIN — SCOPALAMINE 1 PATCH: 1 PATCH, EXTENDED RELEASE TRANSDERMAL at 06:05

## 2023-06-13 RX ADMIN — ROPIVACAINE HYDROCHLORIDE: 2 INJECTION, SOLUTION EPIDURAL; INFILTRATION at 12:44

## 2023-06-13 RX ADMIN — DEXAMETHASONE SODIUM PHOSPHATE 10 MG: 10 INJECTION INTRAMUSCULAR; INTRAVENOUS at 08:40

## 2023-06-13 RX ADMIN — CELECOXIB 200 MG: 200 CAPSULE ORAL at 05:44

## 2023-06-13 RX ADMIN — ROCURONIUM BROMIDE 50 MG: 10 INJECTION, SOLUTION INTRAVENOUS at 08:13

## 2023-06-13 RX ADMIN — ONDANSETRON 4 MG: 2 INJECTION INTRAMUSCULAR; INTRAVENOUS at 11:12

## 2023-06-13 RX ADMIN — HEPARIN SODIUM 5000 UNITS: 5000 INJECTION INTRAVENOUS; SUBCUTANEOUS at 21:09

## 2023-06-13 RX ADMIN — LIDOCAINE HYDROCHLORIDE 100 MG: 20 INJECTION, SOLUTION EPIDURAL; INFILTRATION; INTRACAUDAL at 08:13

## 2023-06-13 RX ADMIN — ROCURONIUM BROMIDE 20 MG: 10 INJECTION, SOLUTION INTRAVENOUS at 09:06

## 2023-06-13 RX ADMIN — ONDANSETRON 4 MG: 2 INJECTION INTRAMUSCULAR; INTRAVENOUS at 17:26

## 2023-06-13 RX ADMIN — LIDOCAINE HYDROCHLORIDE AND EPINEPHRINE 5 ML: 20; 5 INJECTION, SOLUTION EPIDURAL; INFILTRATION; INTRACAUDAL; PERINEURAL at 11:16

## 2023-06-13 RX ADMIN — OXYCODONE AND ACETAMINOPHEN 1 TABLET: 5; 325 TABLET ORAL at 18:39

## 2023-06-13 RX ADMIN — DOCUSATE SODIUM 100 MG: 100 CAPSULE, LIQUID FILLED ORAL at 17:04

## 2023-06-13 RX ADMIN — HYDROMORPHONE HYDROCHLORIDE 0.5 MG: 1 INJECTION, SOLUTION INTRAMUSCULAR; INTRAVENOUS; SUBCUTANEOUS at 23:40

## 2023-06-13 RX ADMIN — SODIUM CHLORIDE 125 ML/HR: 0.9 INJECTION, SOLUTION INTRAVENOUS at 22:39

## 2023-06-13 RX ADMIN — SODIUM CHLORIDE 125 ML/HR: 0.9 INJECTION, SOLUTION INTRAVENOUS at 14:43

## 2023-06-13 RX ADMIN — GABAPENTIN 100 MG: 100 CAPSULE ORAL at 05:43

## 2023-06-13 RX ADMIN — CEFAZOLIN SODIUM 2000 MG: 2 SOLUTION INTRAVENOUS at 08:20

## 2023-06-13 RX ADMIN — ONDANSETRON 4 MG: 2 INJECTION INTRAMUSCULAR; INTRAVENOUS at 12:19

## 2023-06-13 RX ADMIN — DOCUSATE SODIUM 100 MG: 100 CAPSULE, LIQUID FILLED ORAL at 14:41

## 2023-06-13 RX ADMIN — ROPIVACAINE HYDROCHLORIDE: 2 INJECTION, SOLUTION EPIDURAL; INFILTRATION at 18:43

## 2023-06-13 RX ADMIN — SODIUM CHLORIDE, SODIUM LACTATE, POTASSIUM CHLORIDE, AND CALCIUM CHLORIDE: .6; .31; .03; .02 INJECTION, SOLUTION INTRAVENOUS at 10:49

## 2023-06-13 RX ADMIN — ROCURONIUM BROMIDE 20 MG: 10 INJECTION, SOLUTION INTRAVENOUS at 09:56

## 2023-06-13 RX ADMIN — SODIUM CHLORIDE, SODIUM LACTATE, POTASSIUM CHLORIDE, AND CALCIUM CHLORIDE: .6; .31; .03; .02 INJECTION, SOLUTION INTRAVENOUS at 08:13

## 2023-06-13 RX ADMIN — HYDROMORPHONE HYDROCHLORIDE 1 MG: 1 INJECTION, SOLUTION INTRAMUSCULAR; INTRAVENOUS; SUBCUTANEOUS at 10:27

## 2023-06-13 RX ADMIN — SODIUM CHLORIDE, SODIUM LACTATE, POTASSIUM CHLORIDE, AND CALCIUM CHLORIDE: .6; .31; .03; .02 INJECTION, SOLUTION INTRAVENOUS at 08:26

## 2023-06-13 RX ADMIN — OXYCODONE AND ACETAMINOPHEN 1 TABLET: 5; 325 TABLET ORAL at 22:38

## 2023-06-13 RX ADMIN — FENTANYL CITRATE 50 MCG: 50 INJECTION, SOLUTION INTRAMUSCULAR; INTRAVENOUS at 08:55

## 2023-06-13 RX ADMIN — FENTANYL CITRATE 50 MCG: 50 INJECTION, SOLUTION INTRAMUSCULAR; INTRAVENOUS at 12:16

## 2023-06-13 RX ADMIN — ROCURONIUM BROMIDE 20 MG: 10 INJECTION, SOLUTION INTRAVENOUS at 10:48

## 2023-06-13 RX ADMIN — FENTANYL CITRATE 50 MCG: 50 INJECTION, SOLUTION INTRAMUSCULAR; INTRAVENOUS at 12:37

## 2023-06-13 RX ADMIN — SODIUM CHLORIDE: 0.9 INJECTION, SOLUTION INTRAVENOUS at 08:18

## 2023-06-13 RX ADMIN — LIDOCAINE HYDROCHLORIDE,EPINEPHRINE BITARTRATE 5 ML: 20; .005 INJECTION, SOLUTION EPIDURAL; INFILTRATION; INTRACAUDAL; PERINEURAL at 11:07

## 2023-06-13 NOTE — DISCHARGE INSTRUCTIONS
[facility]  [address] 1982, and Ariadne Tovar  [phone]    Hysterectomy Discharge Instructions    WHAT YOU NEED TO KNOW:   A hysterectomy is surgery to remove your uterus  Your ovaries, fallopian tubes, cervix, or part of your vagina may also need to be removed  The organs and tissue that will be removed depends on your medical condition  After a hysterectomy, you will not be able to become pregnant  If your ovaries are removed, you will go through menopause if you have not already  DISCHARGE INSTRUCTIONS:   Contact your doctor at the number above if:   You have a fever over 101o  You have nausea or are vomiting that does not improve after a light meal    Your pain is getting worse, even after you take medicine  You feel pain or burning when you urinate, or you have trouble urinating  You have pus or a foul-smelling odor coming from your vagina  Your wound is red, swollen, or draining pus  You see new or an increased amount of bright red blood coming from your vagina or your incisions  You have questions or concerns about your condition or care  Seek care immediately:   Your arm or leg feels warm, tender, and painful  It may look swollen and red  You have increasing abdominal or pelvic pain  You have heavy vaginal bleeding that fills 1 or more sanitary pads in 1 hour  Call 911 for any of the following: You feel lightheaded, short of breath, and have chest pain  You cough up blood  Medicines: You may need any of the following:  Prescription medicine may be given  You may receive a prescription for pain medication or be advised to use over the counter (OTC) pain medication such as acetaminophen (Tylenol) or ibuprofen (Advil, Motrin)  Ask your healthcare provider how to take this medicine safely  NSAIDs , such as ibuprofen, help decrease swelling, pain, and fever   NSAIDs can cause stomach bleeding or kidney problems in certain people  If you take blood thinner medicine, always ask your healthcare provider if NSAIDs are safe for you  Always read the medicine label and follow directions  Stool softeners help treat or prevent constipation  Take your medicine as directed  Contact your healthcare provider if you think your medicine is not helping or if you have side effects  Tell him or her if you are allergic to any medicine  Keep a list of the medicines, vitamins, and herbs you take  Include the amounts, and when and why you take them  Bring the list or the pill bottles to follow-up visits  Carry your medicine list with you in case of an emergency  Activity:   Rest as needed  Get up and move around as directed to help prevent blood clots  Start with short walks and slowly increase the distance every day  Limit the number of times you climb stairs to 2 times each day for the first week  Plan most of your daily activities on one level of your home  Do not lift objects heavier than 10 pounds for 6 weeks  Avoid strenuous activity for 2 weeks  Do not strain during bowel movements  High-fiber foods and extra liquids can help you prevent constipation  Examples of high-fiber foods are fruit and bran  Prune juice and water are good liquids to drink  Do not have sex, use tampons, or douche for up to 8 weeks  You may shower as soon as the day after surgery  Tub baths can be taken starting 2 weeks after surgery  Do not go into pools or hot tubs until cleared by your doctor  Ask when it is safe for you to drive  It is generally safe to drive after 2 weeks and when no longer taking prescription pain medication  Ask when you may return to work and to other regular activities  Wound care: Care for your abdominal incisions as directed  Carefully wash around the wound with soap and water   If you have Hibiclens or medicated soap that you were instructed to use before surgery, you may use that to wash with for up to 2 days after surgery  If not, any mild non-scented, non-abrasive soap is safe  It is okay to let the soap and water run over your incision  Do not scrub your incision  Dry the area and put on new, clean bandages as directed  Change your bandages when they get wet or dirty  If you have strips of medical tape, let them fall off on their own  It may take 7 to 14 days for them to fall off  Check your incision every day for redness, swelling, or pus  Deep breathing: Take deep breaths and cough 10 times each hour  This will decrease your risk for a lung infection  Take a deep breath and hold it for as long as you can  Let the air out and then cough strongly  Deep breaths help open your airway  You may be given an incentive spirometer to help you take deep breaths  Put the plastic piece in your mouth and take a slow, deep breath, then let the air out and cough  Repeat these steps 10 times every hour  Get support: This surgery may be life-changing for you and your family  You will no longer be able to get pregnant  Sudden changes in the levels of your hormones may occur and cause mood swings and depression  You may feel angry, sad, or frightened, or cry frequently and unexpectedly  These feelings are normal  Talk to your healthcare provider about where you can get support  You can also ask if hormone replacement medicine is right for you  Follow up with your healthcare provider or gynecologist as directed: You may need to return to have stitches removed, and for other tests  Write down your questions so you remember to ask them during your visits  © 2017 [address] Information is for End User's use only and may not be sold, redistributed or otherwise used for commercial purposes  All illustrations and images included in CareNotes® are the copyrighted property of [employer]  or Alphonse Triplett  The above information is an  only   It is not intended as medical advice for individual conditions or treatments  Talk to your doctor, nurse or pharmacist before following any medical regimen to see if it is safe and effective for you

## 2023-06-13 NOTE — ADDENDUM NOTE
Addendum  created 06/13/23 1724 by Carlos Callahan, DO    Order list changed, Pharmacy for encounter modified

## 2023-06-13 NOTE — DISCHARGE SUMMARY
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  She had an epidural for pain control which was removed on POD#2  She then transitioned to PO pain medication and her pain remained well controlled  Her Hgb trended from 10 9 to 10  She passed her void trial after lomas was removed and was able to ambulate without issue  She was discharged home on POD#3 with a prescription for Eliquis 2 5mg BID  Lab Results:   Lab Results   Component Value Date    HCT 40 9 05/22/2023    HGB 12 8 05/22/2023    MCV 92 05/22/2023     05/22/2023    WBC 9 35 05/22/2023     Lab Results   Component Value Date    BUN 10 06/02/2023    CALCIUM 9 4 06/02/2023     06/02/2023    CO2 23 06/02/2023    CREATININE 0 92 06/02/2023    K 4 3 06/02/2023     No results found for: \"POCGLU\"  Lab Results   Component Value Date    PTT 29 05/22/2023     Lab Results   Component Value Date    INR 0 99 05/22/2023    PROTIME 13 2 25/67/8630       Complications: none apparent    Condition at discharge: stable     Discharge instructions/Information to patient and family:   See After Visit Summary for information provided to patient and family  Provisions for Follow-Up Care:  See After Visit Summary for information related to follow-up care and any pertinent home health orders  Disposition: See After Visit Summary for discharge disposition information      Planned " Readmission: Yes, pending clinical status    Discharge Medications: For a complete list of the patient's medications, please refer to her med rec

## 2023-06-13 NOTE — ASSESSMENT & PLAN NOTE
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

## 2023-06-13 NOTE — ASSESSMENT & PLAN NOTE
Hgb 12 8 (5/22) -> 10 9 -> 10 7 --> 10  Pain: s/p epidural, sched tylenol with elvira 2 5/5 PRN and dilaudid for BT  LDA: passed void trial  Diet: regular  DVT ppx: SCDs, heparin

## 2023-06-13 NOTE — ANESTHESIA POSTPROCEDURE EVALUATION
"Post-Op Assessment Note    CV Status:  Stable    Pain management: adequate     Mental Status:  Alert and awake   Hydration Status:  Euvolemic   PONV Controlled:  Controlled   Airway Patency:  Patent      Post Op Vitals Reviewed: Yes      Staff: Anesthesiologist, CRNA         No notable events documented      BP      Temp      Pulse     Resp      SpO2      /93   Pulse 76   Temp 98 5 °F (36 9 °C)   Resp 12   Ht 5' 7\" (1 702 m)   Wt 84 8 kg (186 lb 15 2 oz)   LMP 05/15/2023 (Exact Date)   SpO2 97%   BMI 29 28 kg/m²     "

## 2023-06-13 NOTE — INTERVAL H&P NOTE
H&P reviewed  After examining the patient I find no changes in the patients condition since the H&P had been written  After discussion with patient, patient would prefer removal of uterus  She has previously consented for this  Planned, exploratory laparotomy total abdominal hysterectomy left, salpingooophorectomy right salpingectomy and Ovarian cystectomy posssible TAHBSO and staging  Your referral catheters will  be placed by urology preoperatively  Epidural catheter will be placed as well  Patient is aware that removal of uterus will result in inability to have children

## 2023-06-13 NOTE — PLAN OF CARE
Problem: MOBILITY - ADULT  Goal: Maintain or return to baseline ADL function  Description: INTERVENTIONS:  -  Assess patient's ability to carry out ADLs; assess patient's baseline for ADL function and identify physical deficits which impact ability to perform ADLs (bathing, care of mouth/teeth, toileting, grooming, dressing, etc )  - Assess/evaluate cause of self-care deficits   - Assess range of motion  - Assess patient's mobility; develop plan if impaired  - Assess patient's need for assistive devices and provide as appropriate  - Encourage maximum independence but intervene and supervise when necessary  - Involve family in performance of ADLs  - Assess for home care needs following discharge   - Consider OT consult to assist with ADL evaluation and planning for discharge  - Provide patient education as appropriate  Outcome: Progressing  Goal: Maintains/Returns to pre admission functional level  Description: INTERVENTIONS:  - Perform BMAT or MOVE assessment daily    - Set and communicate daily mobility goal to care team and patient/family/caregiver     - Collaborate with rehabilitation services on mobility goals if consulted  - Dangle patient 2 times a day  - Stand patient 3 times a day  - Ambulate patient 3 times a day  - Out of bed to chair 3 times a day   - Out of bed for meals 3 times a day  - Out of bed for toileting  - Record patient progress and toleration of activity level   Outcome: Progressing     Problem: PAIN - ADULT  Goal: Verbalizes/displays adequate comfort level or baseline comfort level  Description: Interventions:  - Encourage patient to monitor pain and request assistance  - Assess pain using appropriate pain scale  - Administer analgesics based on type and severity of pain and evaluate response  - Implement non-pharmacological measures as appropriate and evaluate response  - Consider cultural and social influences on pain and pain management  - Notify physician/advanced practitioner if interventions unsuccessful or patient reports new pain  Outcome: Progressing     Problem: INFECTION - ADULT  Goal: Absence or prevention of progression during hospitalization  Description: INTERVENTIONS:  - Assess and monitor for signs and symptoms of infection  - Monitor lab/diagnostic results  - Monitor all insertion sites, i e  indwelling lines, tubes, and drains  - Monitor endotracheal if appropriate and nasal secretions for changes in amount and color  - [place] appropriate cooling/warming therapies per order  - Administer medications as ordered  - Instruct and encourage patient and family to use good hand hygiene technique  - Identify and instruct in appropriate isolation precautions for identified infection/condition  Outcome: Progressing     Problem: SAFETY ADULT  Goal: Patient will remain free of falls  Description: INTERVENTIONS:  - Educate patient/family on patient safety including physical limitations  - Instruct patient to call for assistance with activity   - Consult OT/PT to assist with strengthening/mobility   - Keep Call bell within reach  - Keep bed low and locked with side rails adjusted as appropriate  - Keep care items and personal belongings within reach  - Initiate and maintain comfort rounds  - Make Fall Risk Sign visible to staff  - Offer Toileting every 2 Hours, in advance of need  - Initiate/Maintain bed alarm  - Apply yellow socks and bracelet for high fall risk patients  - Consider moving patient to room near nurses station  Outcome: Progressing     Problem: DISCHARGE PLANNING  Goal: Discharge to home or other facility with appropriate resources  Description: INTERVENTIONS:  - Identify barriers to discharge w/patient and caregiver  - Arrange for needed discharge resources and transportation as appropriate  - Identify discharge learning needs (meds, wound care, etc )  - Arrange for interpretive services to assist at discharge as needed  - Refer to Case Management Department for coordinating discharge planning if the patient needs post-hospital services based on physician/advanced practitioner order or complex needs related to functional status, cognitive ability, or social support system  Outcome: Progressing     Problem: Knowledge Deficit  Goal: Patient/family/caregiver demonstrates understanding of disease process, treatment plan, medications, and discharge instructions  Description: Complete learning assessment and assess knowledge base    Interventions:  - Provide teaching at level of understanding  - Provide teaching via preferred learning methods  Outcome: Progressing     Problem: GASTROINTESTINAL - ADULT  Goal: Minimal or absence of nausea and/or vomiting  Description: INTERVENTIONS:  - Administer IV fluids if ordered to ensure adequate hydration  - Maintain NPO status until nausea and vomiting are resolved  - Nasogastric tube if ordered  - Administer ordered antiemetic medications as needed  - Provide nonpharmacologic comfort measures as appropriate  - Advance diet as tolerated, if ordered  - Consider nutrition services referral to assist patient with adequate nutrition and appropriate food choices  Outcome: Progressing  Goal: Maintains or returns to baseline bowel function  Description: INTERVENTIONS:  - Assess bowel function  - Encourage oral fluids to ensure adequate hydration  - Administer IV fluids if ordered to ensure adequate hydration  - Administer ordered medications as needed  - Encourage mobilization and activity  - Consider nutritional services referral to assist patient with adequate nutrition and appropriate food choices  Outcome: Progressing     Problem: GENITOURINARY - ADULT  Goal: Maintains or returns to baseline urinary function  Description: INTERVENTIONS:  - Assess urinary function  - Encourage oral fluids to ensure adequate hydration if ordered  - Administer IV fluids as ordered to ensure adequate hydration  - Administer ordered medications as needed  - Offer frequent toileting  - Follow urinary retention protocol if ordered  Outcome: Progressing  Goal: Absence of urinary retention  Description: INTERVENTIONS:  - Assess patient’s ability to void and empty bladder  - Monitor I/O  - Bladder scan as needed  - Discuss with physician/AP medications to alleviate retention as needed  - Discuss catheterization for long term situations as appropriate  Outcome: Progressing  Goal: Urinary catheter remains patent  Description: INTERVENTIONS:  - Assess patency of urinary catheter  - If patient has a chronic lomas, consider changing catheter if non-functioning  - Follow guidelines for intermittent irrigation of non-functioning urinary catheter  Outcome: Progressing     Problem: SKIN/TISSUE INTEGRITY - ADULT  Goal: Skin Integrity remains intact(Skin Breakdown Prevention)  Description: Assess:  -Perform Sameer assessment every shift  -Clean and moisturize skin every shift  -Inspect skin when repositioning, toileting, and assisting with ADLS  -Assess under medical devices such as scd sleeves every shift  -Assess extremities for adequate circulation and sensation     Bed Management:  -Have minimal linens on bed & keep smooth, unwrinkled  -Change linens as needed when moist or perspiring  -Avoid sitting or lying in one position for more than 2 hours while in bed  -Keep HOB at 30 degrees     Toileting:  -Offer bedside commode    Activity:  -Mobilize patient 4 times a day  -Encourage activity and walks on unit  -Encourage or provide ROM exercises   -Turn and reposition patient every 2 Hours  -Use appropriate equipment to lift or move patient in bed  -Instruct/ Assist with weight shifting every 2 hours when out of bed in chair  -Consider limitation of chair time 2 hour intervals    Skin Care:  -Avoid use of baby powder, tape, friction and shearing, hot water or constrictive clothing    Next Steps:  -Teach patient strategies to minimize risks such as incentive spirometer  -Consider consults to interdisciplinary teams such as N/A  Outcome: Progressing  Goal: Incision(s), wounds(s) or drain site(s) healing without S/S of infection  Description: INTERVENTIONS  - Assess and document dressing, incision, wound bed, drain sites and surrounding tissue  - Provide patient and family education  - Perform skin care/dressing changes every  Outcome: Progressing

## 2023-06-13 NOTE — ANESTHESIA PROCEDURE NOTES
Epidural Block    Patient location during procedure: holding area  Start time: 6/13/2023 7:55 AM  Reason for block: at surgeon's request and post-op pain management  Staffing  Performed by: Harrison Iraheta DO  Authorized by: Harrison Iraheta DO    Preanesthetic Checklist  Completed: patient identified, IV checked, site marked, risks and benefits discussed, surgical consent, monitors and equipment checked, pre-op evaluation and timeout performed  Epidural  Patient position: sitting  Prep: Betadine  Patient monitoring: heart rate, cardiac monitor, continuous pulse ox and frequent blood pressure checks  Approach: midline  Location: thoracic  Injection technique: SHANNON saline  Needle  Needle type: Tuohy   Needle gauge: 18 G  Catheter type: side hole  Catheter size: 20 G  Catheter at skin depth: 10 cm  Catheter securement method: clear occlusive dressing  Test dose: negativefentanyl 50 mcg/mL - Intravenous   100 mcg - 6/13/2023 7:35:00 AM  midazolam (VERSED) 2 mg/2 mL - Intravenous   2 mg - 6/13/2023 7:35:00 AM  Assessment  Number of attempts: 3 or morenegative aspiration for CSF, negative aspiration for heme and no paresthesia on injection  patient tolerated the procedure well with no immediate complications

## 2023-06-13 NOTE — INTERVAL H&P NOTE
H&P reviewed  After examining the patient I find no changes in the patients condition since the H&P had been written  Vitals:    06/13/23 0526   BP: 126/85   Pulse: 77   Resp: 20   Temp: 97 8 °F (36 6 °C)   SpO2: 97%   CT reviewed and case discussed with Dr Lavinia Torres- will proceed with cysto bilateral ureteral catheters (not stents)  Procedure reviewed with patient  Risks discussed and consent signed

## 2023-06-13 NOTE — OP NOTE
OPERATIVE REPORT  PATIENT NAME: Michela Vargas    :  1990  MRN: [id number]  Pt Location: [facility] OR ROOM 08    SURGERY DATE: 2023    Surgeon(s) and Role:  Panel 1:     * Gonzalo Covington MD - Primary  Panel 2:     * Natasha Luna MD - Primary    Preop Diagnosis:  Pelvic mass R19 00    Post-Op Diagnosis Codes:     * Pelvic mass [R19 00]    Procedure(s):  Bilateral - CYSTO; BILATERAL URETERAL CATHETER PLACEMENT  Specimen(s):  * No specimens in log *    Estimated Blood Loss:   Minimal    Drains:  * No LDAs found *    Anesthesia Type:   General w/ Epidural    Operative Indications:  Pelvic mass R19 00  LEFT HYDRONEPHROSIS    Operative Findings:  Generally normal-appearing bladder and ureteral orifices  A red marked ureteral catheter was placed on the right  A black marked catheter was placed on the left  The left catheter placement was difficult secondary to extrinsic compression from the pelvic mass  A 16 Lao Dee catheter was placed at the conclusion of the urologic portion of the procedure  Adapter was used to allow both ureteral catheters to drain into the Dee catheter  Complications:   None    Procedure and Technique:  The patient was brought to the operating room and properly identified  General anesthesia was administered  He was placed in lithotomy position, padded appropriately and prepped and draped in the usual sterile fashion  Compression boots were employed  Intravenous antibiotic had been administered by anesthesia  An appropriate timeout was then performed  Cystourethroscopy was then performed with a 22 Lao cystoscope  Findings, are as above  No bladder lesion was identified  The ureteral orifice ease were normal   A 5 Lao tiger tail catheter with a ubaldo at the distal end was placed easily up the right collecting system to the level of the renal pelvis  A 5 Lao tiger tail catheter with a black ubaldo at the distal end was then placed up the left side    The left side was more difficult  I did place a Solo guidewire up through the  tiger tail  catheter to aid in the placement of the catheter  There was placed the wire was removed  The cystoscope was removed  A 16 Bengali Lomas catheter was then placed into the bladder and the balloon filled to 10 cc  An adapter was used to place both ureteral catheters into the lomas  The lomas was placed to straight drainage  The patient tolerated this portion of the procedure well  The case was then turned over to [address] [place] [address] with the patient in satisfactory condition  I was present for the entire urologic portion of procedure            SIGNATURE: Olga Russell MD  DATE: June 13, 2023  TIME: 8:40 AM

## 2023-06-13 NOTE — OP NOTE
OPERATIVE REPORT  PATIENT NAME: Manjula Healy    :  1990  MRN: [id number]  Pt Location: [facility]OM 08    SURGERY DATE: 2023    Surgeon(s) and Role:  Panel 1:     * Nasra Quesada MD - Primary  Panel 2:     * Jb Ng MD - Primary     * Suraj Choe MD - Assisting     * Marly Crenshaw MD - Assisting    Preop Diagnosis:  Pelvic mass [R19 00]    Post-Op Diagnosis Codes:     * Pelvic mass [R19 00]    Procedure(s):  Bilateral - CYSTO; STENT URETERAL  EX LAP  OCHOA/BSO  STAGING radical omentectomy bilateral pelvic lymph node dissection appendectomy    Specimen(s):  ID Type Source Tests Collected by Time Destination   1 : left fallopian tube and left ovary Tissue Fallopian Tube, Left TISSUE EXAM Jb Ng MD 2023 0913    2 : left pelvic sidewall Tissue Pelvic TISSUE EXAM Jb Ng MD 2023 1023    3 : uterus, cervix, right fallopian tube Tissue Uterus TISSUE EXAM Jb Ng MD 2023 0957    4 :  Tissue Ovary, Right TISSUE EXAM Jb Ng MD 2023 1006    5 : left pelvic lymph node Tissue Lymph Node TISSUE EXAM Jb Ng MD 2023 1019    6 : bladder flap Tissue Urinary Bladder TISSUE EXAM Jb Ng MD 2023 1024    7 :  Tissue Large Intestine, Sigmoid Colon TISSUE EXAM Jb Ng MD 2023 1025    8 :  Tissue Cul-de-sac ANAEROBIC CULTURE AND GRAM STAIN, TISSUE EXAM Jb Ng MD 2023 1025    9 : right pelvic lymph node Tissue Lymph Node TISSUE EXAM Jb Ng MD 2023 1037    10 : Tissue Omentum TISSUE EXAM Jb Ng MD 2023 1045    11 :  Tissue Diaphragm TISSUE EXAM Jb Ng MD 2023 1014    12 :   Tissue Appendix TISSUE EXAM Jb Ng MD 2023 1050    13 : left gutter Tissue Peritoneum TISSUE EXAM Jb Ng MD 2023 1054    14 : right gutter Tissue Peritoneum TISSUE EXAM Jb Ng MD 2023 1058    15 : right pelvic sidewall Tissue Pelvic TISSUE EXAM Natasha Luna MD 6/13/2023 1101        Estimated Blood Loss:   450 mL    Drains:  Urethral Catheter Latex 16 Fr  (Active)   Number of days: 0       Anesthesia Type:   General w/ Epidural    Operative Indications:  Pelvic mass [R19 00]      Operative Findings:  Upon opening the abdomen there was a large 15 x 20 cm left adnexal mass which was densely adherent to the pelvic sidewall and leaked dark fluid upon removal   The omentum was stained with darkly colored fluid indicating leakage likely also occurred prior to the procedure  The left ovary was removed and 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  There was significant adhesions between the left portion of the uterus left ovary and left pelvic sidewall  The right ovary was densely adherent into the cul-de-sac with old hemorrhagic tissue densely adherent  The bilateral ureteral catheters were palpated without difficulty  There is no evidence of tumor in the upper abdomen or in any of the staging biopsies  Throughout the abdomen there were multiple areas consistent with old hemorrhage including a cul-de-sac lesion which was densely adherent to the right ovary  This was removed  The distal portion of the appendix had the same and therefore an appendectomy was performed  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  No visible tumor remained at the completion of the procedure  Complications:   None    Procedure and Technique:    After informed consent was obtained, the patient was taken to the operating room where general endotracheal anesthesia was then administered without incident  A full time out procedure was performed  The patient was prepped and draped in normal sterile fashion in the low dorsolithotomy position  A Dee catheter was inserted   A midline vertical incision was created with a knife and carried through to the fascia with a Bovie electrocautery device  This was taken down to the underlying layer of fascia with cautery  The fascia was opened the midline  The fascial incision extended superiorly and anteriorly  The peritoneum was identified and entered  The peritoneal incision extended superiorly and inferiorly as well  There was dark-tinged fluid upon entering the abdomen between 50 and 100 cc  The left ovary was immediately identified and noted to be densely adherent to the left pelvic sidewall uterine fundus and cul-de-sac  The left pelvic sidewall was opened  The ureter and IP ligament were identified a hole was placed between the 2  The IP ligament was taken down with large jaw Enseal device  The utero-ovarian ligament was taken down in the same fashion including the tubes  The left round ligament was taken down in the same fashion  Sharp and blunt dissection between the left pelvic sidewall sigmoid colon mesentery and mass then occurred to release the mass  In doing so rupture of the mass resulted in further leakage of fluid  The mass was then removed in its entirety and 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  Based on prior discussion with the patient was elected to proceed with OCHOA/BSO and staging procedure  A Bookwalter self retaining retractor was placed  The bowel was packed with moist lap pads  The cornua of the uterus was grasped with 2 Jasmin clamps  The retro-peritoneal space on the right side was opened using cautery  The round ligament was transected  The ureter was identified coursing normally within the medial leaf of the broad ligament  The infundibulum pelvic ligament on the right side was skeletonized, and taken down with a large jaw Enseal device  Hemostasis was assured  The vesicovaginal space was then opened using cautery  The bladder was taken down below the level of the external os of the cervix  The uterine vessels were skeletonized bilaterally   They were  taken down with a large jaw Enseal device  The cardinal ligaments were taken down in a  similar fashion  taken down with a large jaw Enseal device until the level of the external os of the cervix was reached  Right angle Zeppelin clamps were used to come across the upper portion of the vagina  The specimen was then removed  The vaginal apex was then secured using interrupted figure-of-eight 0 Vicryl suture with excellent hemostasis noted  The pelvis was irrigated  There is no evidence of bleeding noted  Bilateral pelvic sidewalls were opened exposing the vasculature in the pelvis  All lymph node bearing tissue in and around the external iliac artery and vein internal iliac artery and vein and obturator space was removed and sent as site-specific pelvic nodes  Pelvic wall biopsies bladder flap and cul-de-sac nodule as well as sigmoid colon nodules were removed and sent for biopsy  Bilateral gutter biopsies were taken  The appendix was noted to have inflammatory tissue at the distal portion  The mesoappendix was taken down with a large jaw Enseal device  The base of the appendix was free tied with 0 silk suture and then cut  The distal portion of the remaining appendiceal stump was cauterized with the Bovie electrocautery device  Brushings were taken from the right diaphragm  A radical omentectomy was performed by  the omentum from the transverse colon and the greater curvature of the stomach with the Enseal    The abdomen is irrigated with copious quantities normal saline solution  No further bleeding sites were noted  The closure tray was brought to the field  The staff was regloved and regowned  The fascia was closed using #1 looped PDS in a running Smead-Lewis fashion  The subcutaneous tissue was closed with interrupted 2 0 Vicryl suture  The skin was closed with stratafix and histocryl  A sterile dressing was applied   The patient was then awakened and transferred to the recovery room in stable condition  All instrument and instrument counts were correct X 2 for the procedure  No complications were noted  I was present for the entire procedure  The bilateral ureteral catheters were removed and the Dee catheter was allowed to stay in place  I was present for the entire procedure      Patient Disposition:  PACU     This procedure was not performed to treat colon cancer through resection      SIGNATURE: Fang Saldana MD  DATE: June 13, 2023  TIME: 11:39 AM

## 2023-06-14 LAB
ANION GAP SERPL CALCULATED.3IONS-SCNC: 6 MMOL/L (ref 4–13)
BASOPHILS # BLD AUTO: 0.02 THOUSANDS/ÂΜL (ref 0–0.1)
BASOPHILS NFR BLD AUTO: 0 % (ref 0–1)
BUN SERPL-MCNC: 7 MG/DL (ref 5–25)
CALCIUM SERPL-MCNC: 7.6 MG/DL (ref 8.4–10.2)
CHLORIDE SERPL-SCNC: 108 MMOL/L (ref 96–108)
CO2 SERPL-SCNC: 23 MMOL/L (ref 21–32)
CREAT SERPL-MCNC: 0.73 MG/DL (ref 0.6–1.3)
EOSINOPHIL # BLD AUTO: 0.02 THOUSAND/ÂΜL (ref 0–0.61)
EOSINOPHIL NFR BLD AUTO: 0 % (ref 0–6)
ERYTHROCYTE [DISTWIDTH] IN BLOOD BY AUTOMATED COUNT: 13.6 % (ref 11.6–15.1)
GFR SERPL CREATININE-BSD FRML MDRD: 107 ML/MIN/1.73SQ M
GLUCOSE SERPL-MCNC: 98 MG/DL (ref 65–140)
HCT VFR BLD AUTO: 35.2 % (ref 34.8–46.1)
HGB BLD-MCNC: 10.9 G/DL (ref 11.5–15.4)
IMM GRANULOCYTES # BLD AUTO: 0.06 THOUSAND/UL (ref 0–0.2)
IMM GRANULOCYTES NFR BLD AUTO: 1 % (ref 0–2)
LYMPHOCYTES # BLD AUTO: 2.51 THOUSANDS/ÂΜL (ref 0.6–4.47)
LYMPHOCYTES NFR BLD AUTO: 22 % (ref 14–44)
MAGNESIUM SERPL-MCNC: 1.7 MG/DL (ref 1.9–2.7)
MCH RBC QN AUTO: 28.6 PG (ref 26.8–34.3)
MCHC RBC AUTO-ENTMCNC: 31 G/DL (ref 31.4–37.4)
MCV RBC AUTO: 92 FL (ref 82–98)
MONOCYTES # BLD AUTO: 1.13 THOUSAND/ÂΜL (ref 0.17–1.22)
MONOCYTES NFR BLD AUTO: 10 % (ref 4–12)
NEUTROPHILS # BLD AUTO: 7.61 THOUSANDS/ÂΜL (ref 1.85–7.62)
NEUTS SEG NFR BLD AUTO: 67 % (ref 43–75)
NRBC BLD AUTO-RTO: 0 /100 WBCS
PLATELET # BLD AUTO: 320 THOUSANDS/UL (ref 149–390)
PMV BLD AUTO: 10.1 FL (ref 8.9–12.7)
POTASSIUM SERPL-SCNC: 4.1 MMOL/L (ref 3.5–5.3)
RBC # BLD AUTO: 3.81 MILLION/UL (ref 3.81–5.12)
SODIUM SERPL-SCNC: 137 MMOL/L (ref 135–147)
WBC # BLD AUTO: 11.35 THOUSAND/UL (ref 4.31–10.16)

## 2023-06-14 PROCEDURE — 83735 ASSAY OF MAGNESIUM: CPT | Performed by: OBSTETRICS & GYNECOLOGY

## 2023-06-14 PROCEDURE — 99024 POSTOP FOLLOW-UP VISIT: CPT | Performed by: OBSTETRICS & GYNECOLOGY

## 2023-06-14 PROCEDURE — 85025 COMPLETE CBC W/AUTO DIFF WBC: CPT | Performed by: OBSTETRICS & GYNECOLOGY

## 2023-06-14 PROCEDURE — 80048 BASIC METABOLIC PNL TOTAL CA: CPT | Performed by: OBSTETRICS & GYNECOLOGY

## 2023-06-14 RX ORDER — HEPARIN SODIUM 5000 [USP'U]/ML
5000 INJECTION, SOLUTION INTRAVENOUS; SUBCUTANEOUS EVERY 8 HOURS SCHEDULED
Status: CANCELLED | OUTPATIENT
Start: 2023-06-14 | End: 2023-06-15

## 2023-06-14 RX ORDER — HYDROMORPHONE HCL/PF 1 MG/ML
0.5 SYRINGE (ML) INJECTION EVERY 2 HOUR PRN
Status: DISCONTINUED | OUTPATIENT
Start: 2023-06-14 | End: 2023-06-15

## 2023-06-14 RX ORDER — OXYCODONE HYDROCHLORIDE AND ACETAMINOPHEN 5; 325 MG/1; MG/1
1 TABLET ORAL EVERY 4 HOURS PRN
Status: DISCONTINUED | OUTPATIENT
Start: 2023-06-14 | End: 2023-06-15

## 2023-06-14 RX ADMIN — HYDROMORPHONE HYDROCHLORIDE 0.5 MG: 1 INJECTION, SOLUTION INTRAMUSCULAR; INTRAVENOUS; SUBCUTANEOUS at 05:32

## 2023-06-14 RX ADMIN — ONDANSETRON 4 MG: 2 INJECTION INTRAMUSCULAR; INTRAVENOUS at 17:54

## 2023-06-14 RX ADMIN — ROPIVACAINE HYDROCHLORIDE: 2 INJECTION, SOLUTION EPIDURAL; INFILTRATION at 04:14

## 2023-06-14 RX ADMIN — HYDROMORPHONE HYDROCHLORIDE 0.5 MG: 1 INJECTION, SOLUTION INTRAMUSCULAR; INTRAVENOUS; SUBCUTANEOUS at 11:15

## 2023-06-14 RX ADMIN — HEPARIN SODIUM 5000 UNITS: 5000 INJECTION INTRAVENOUS; SUBCUTANEOUS at 13:23

## 2023-06-14 RX ADMIN — OXYCODONE AND ACETAMINOPHEN 1 TABLET: 5; 325 TABLET ORAL at 19:19

## 2023-06-14 RX ADMIN — DOCUSATE SODIUM 100 MG: 100 CAPSULE, LIQUID FILLED ORAL at 17:54

## 2023-06-14 RX ADMIN — OXYCODONE AND ACETAMINOPHEN 1 TABLET: 5; 325 TABLET ORAL at 14:52

## 2023-06-14 RX ADMIN — HYDROMORPHONE HYDROCHLORIDE 0.5 MG: 1 INJECTION, SOLUTION INTRAMUSCULAR; INTRAVENOUS; SUBCUTANEOUS at 01:46

## 2023-06-14 RX ADMIN — OXYCODONE AND ACETAMINOPHEN 1 TABLET: 5; 325 TABLET ORAL at 09:14

## 2023-06-14 RX ADMIN — ROPIVACAINE HYDROCHLORIDE: 2 INJECTION, SOLUTION EPIDURAL; INFILTRATION at 13:22

## 2023-06-14 RX ADMIN — HYDROMORPHONE HYDROCHLORIDE 0.5 MG: 1 INJECTION, SOLUTION INTRAMUSCULAR; INTRAVENOUS; SUBCUTANEOUS at 17:54

## 2023-06-14 RX ADMIN — OXYCODONE AND ACETAMINOPHEN 1 TABLET: 5; 325 TABLET ORAL at 03:05

## 2023-06-14 RX ADMIN — HEPARIN SODIUM 5000 UNITS: 5000 INJECTION INTRAVENOUS; SUBCUTANEOUS at 05:35

## 2023-06-14 RX ADMIN — HYDROMORPHONE HYDROCHLORIDE 0.5 MG: 1 INJECTION, SOLUTION INTRAMUSCULAR; INTRAVENOUS; SUBCUTANEOUS at 23:55

## 2023-06-14 RX ADMIN — SODIUM CHLORIDE 125 ML/HR: 0.9 INJECTION, SOLUTION INTRAVENOUS at 13:23

## 2023-06-14 RX ADMIN — DOCUSATE SODIUM 100 MG: 100 CAPSULE, LIQUID FILLED ORAL at 09:14

## 2023-06-14 RX ADMIN — OXYCODONE AND ACETAMINOPHEN 1 TABLET: 5; 325 TABLET ORAL at 23:11

## 2023-06-14 RX ADMIN — HYDROMORPHONE HYDROCHLORIDE 0.5 MG: 1 INJECTION, SOLUTION INTRAMUSCULAR; INTRAVENOUS; SUBCUTANEOUS at 20:46

## 2023-06-14 RX ADMIN — HEPARIN SODIUM 5000 UNITS: 5000 INJECTION INTRAVENOUS; SUBCUTANEOUS at 21:39

## 2023-06-14 RX ADMIN — ROPIVACAINE HYDROCHLORIDE: 2 INJECTION, SOLUTION EPIDURAL; INFILTRATION at 20:46

## 2023-06-14 NOTE — UTILIZATION REVIEW
Initial Clinical Review    Elective IP surgical procedure  Age/Sex: 27 y o  female  Surgery Date: 6/13/2023  Procedure:   Bilateral - CYSTO; STENT URETERAL  EX LAP  OCHOA/BSO  STAGING radical omentectomy bilateral pelvic lymph node dissection appendectomy  Anesthesia: General w/ epidural  Operative Findings:   Upon opening the abdomen there was a large 15 x 20 cm left adnexal mass which was densely adherent to the pelvic sidewall and leaked dark fluid upon removal   The omentum was stained with darkly colored fluid indicating leakage likely also occurred prior to the procedure  The left ovary was removed and 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  There was significant adhesions between the left portion of the uterus left ovary and left pelvic sidewall  The right ovary was densely adherent into the cul-de-sac with old hemorrhagic tissue densely adherent  The bilateral ureteral catheters were palpated without difficulty  There is no evidence of tumor in the upper abdomen or in any of the staging biopsies  Throughout the abdomen there were multiple areas consistent with old hemorrhage including a cul-de-sac lesion which was densely adherent to the right ovary  This was removed  The distal portion of the appendix had the same and therefore an appendectomy was performed      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  No visible tumor remained at the completion of the procedure  POD#1 Progress Note: Pain is well controlled with epidural   Pt is not currently voiding  She is not ambulating   (-) flatus, (-) BM  She is tolerating clear liquids, and denies N/V  Hgb 10 9, continue to monitor for ABLA  APS following, continue epidural  Ede in place  Continue clear liquid diet  Incentive spirometry  SQH/SCDs  Scopolamine patch and Zofran PRN  F/u on final path from OR         Admission Orders: Date/Time/Statement:   Admission Orders (From admission, onward)     Ordered        06/13/23 1157  Inpatient Admission  Once                      Orders Placed This Encounter   Procedures   • Inpatient Admission     Standing Status:   Standing     Number of Occurrences:   1     Order Specific Question:   Level of Care     Answer:   Med Surg [16]     Order Specific Question:   Estimated length of stay     Answer:   More than 2 Midnights     Order Specific Question:   Certification     Answer:   I certify that inpatient services are medically necessary for this patient for a duration of greater than two midnights  See H&P and MD Progress Notes for additional information about the patient's course of treatment       Vital Signs:   Date/Time Temp Pulse Resp BP MAP (mmHg) SpO2 O2 Device Patient Position - Orthostatic VS   06/14/23 13:13:37 97 7 °F (36 5 °C) 80 17 124/68 87 97 % -- --   06/14/23 09:12:29 97 2 °F (36 2 °C) Abnormal  70 16 134/85 101 97 % -- Lying   06/14/23 07:37:13 97 1 °F (36 2 °C) Abnormal  69 19 125/82 96 97 % None (Room air) Lying   06/14/23 03:02:10 98 °F (36 7 °C) 61 18 133/82 99 97 % -- --   06/14/23 0300 98 °F (36 7 °C) 62 -- 133/82 99 97 % -- --   06/14/23 0200 -- 68 -- 123/83 96 97 % -- --   06/14/23 01:49:34 97 3 °F (36 3 °C) Abnormal  75 22 123/83 96 92 % -- --   06/13/23 23:08:49 98 8 °F (37 1 °C) 84 18 119/68 85 97 % None (Room air) Lying   06/13/23 1934 -- 87 18 121/62 82 97 % None (Room air) Lying   06/13/23 17:24:52 -- 81 17 141/98 112 96 % -- Sitting   06/13/23 16:22:37 -- 73 -- 149/100 116 97 % -- --   06/13/23 16:20:06 -- 73 17 144/95 111 97 % -- --   06/13/23 15:22:55 -- 80 -- 148/95 113 98 % -- --   06/13/23 15:17:53 97 6 °F (36 4 °C) 76 -- 136/94 108 97 % -- Lying   06/13/23 1517 -- -- 17 -- -- -- -- --   06/13/23 14:21:41 97 5 °F (36 4 °C) 74 19 147/85 106 96 % None (Room air) Lying   06/13/23 1351 -- 76 -- -- -- 97 % -- --   06/13/23 1339 -- 72 12 149/93 116 96 % -- --   06/13/23 1324 -- 82 14 -- -- 98 % -- -- 06/13/23 1309 -- 74 21 144/89 111 96 % -- --   06/13/23 1254 -- 68 14 141/82 105 97 % -- --   06/13/23 1244 -- 78 14 138/84 103 96 % -- --   06/13/23 1216 -- 80 22 -- -- 96 % -- --   06/13/23 1209 -- 82 22 126/69 92 95 % -- --   06/13/23 1154 98 5 °F (36 9 °C) 77 12 118/64 -- 96 % None (Room air) --   06/13/23 0526 97 8 °F (36 6 °C) 77 20 126/85 -- 97 % None (Room air) --     Pertinent Labs/Diagnostic Test Results:   Results from last 7 days   Lab Units 06/14/23  0528   HEMATOCRIT % 35 2   HEMOGLOBIN g/dL 10 9*   NEUTROS ABS Thousands/µL 7 61   PLATELETS Thousands/uL 320   WBC Thousand/uL 11 35*     Results from last 7 days   Lab Units 06/14/23  0528   ANION GAP mmol/L 6   BUN mg/dL 7   CALCIUM mg/dL 7 6*   CHLORIDE mmol/L 108   CO2 mmol/L 23   CREATININE mg/dL 0 73   EGFR ml/min/1 73sq m 107   POTASSIUM mmol/L 4 1   MAGNESIUM mg/dL 1 7*   SODIUM mmol/L 137     Results from last 7 days   Lab Units 06/14/23  0528   GLUCOSE RANDOM mg/dL 98         Scheduled Medications:  docusate sodium, 100 mg, Oral, BID  heparin (porcine), 5,000 Units, Subcutaneous, Q8H [facility]  scopolamine, 1 patch, Transdermal, Q72H    Continuous IV Infusions:  ropivacaine 0 2%, , Epidural, Continuous  sodium chloride, 125 mL/hr, Intravenous, Continuous    PRN Meds:  HYDROmorphone, 0 5 mg, Intravenous, Q2H PRN 6/13 x1, 6/14 x3  ondansetron, 4 mg, Intravenous, Q6H PRN 6/13 x2  oxyCODONE-acetaminophen, 1 tablet, Oral, Q4H PRN 6/13 x2, 6/14 x2        Network Utilization Review Department  ATTENTION: Please call with any questions or concerns to [phone] and carefully listen to the prompts so that you are directed to the right person  All voicemails are confidential   Raghu Garay all requests for admission clinical reviews, approved or denied determinations and any other requests to dedicated fax number below belonging to the campus where the patient is receiving treatment   List of dedicated fax numbers for the Facilities:  [facility] ADMISSION DENIALS (Administrative/Medical Necessity) [phone]   [address] (Maternity/NICU/Pediatrics) Shanique Barreto [address] [place]  [phone]   [address] [facility] [address] [place] [address] [place] [address] [place] [address] [facility] [address] [facility] [place] [address] [phone]     '

## 2023-06-14 NOTE — PLAN OF CARE
Problem: MOBILITY - ADULT  Goal: Maintain or return to baseline ADL function  Description: INTERVENTIONS:  -  Assess patient's ability to carry out ADLs; assess patient's baseline for ADL function and identify physical deficits which impact ability to perform ADLs (bathing, care of mouth/teeth, toileting, grooming, dressing, etc )  - Assess/evaluate cause of self-care deficits   - Assess range of motion  - Assess patient's mobility; develop plan if impaired  - Assess patient's need for assistive devices and provide as appropriate  - Encourage maximum independence but intervene and supervise when necessary  - Involve family in performance of ADLs  - Assess for home care needs following discharge   - Consider OT consult to assist with ADL evaluation and planning for discharge  - Provide patient education as appropriate  Outcome: Progressing  Goal: Maintains/Returns to pre admission functional level  Description: INTERVENTIONS:  - Perform BMAT or MOVE assessment daily    - Set and communicate daily mobility goal to care team and patient/family/caregiver     - Collaborate with rehabilitation services on mobility goals if consulted  - Dangle patient 2 times a day  - Stand patient 3 times a day  - Ambulate patient 3 times a day  - Out of bed to chair 3 times a day   - Out of bed for meals 3 times a day  - Out of bed for toileting  - Record patient progress and toleration of activity level   Outcome: Progressing     Problem: PAIN - ADULT  Goal: Verbalizes/displays adequate comfort level or baseline comfort level  Description: Interventions:  - Encourage patient to monitor pain and request assistance  - Assess pain using appropriate pain scale  - Administer analgesics based on type and severity of pain and evaluate response  - Implement non-pharmacological measures as appropriate and evaluate response  - Consider cultural and social influences on pain and pain management  - Notify physician/advanced practitioner if interventions unsuccessful or patient reports new pain  Outcome: Progressing     Problem: INFECTION - ADULT  Goal: Absence or prevention of progression during hospitalization  Description: INTERVENTIONS:  - Assess and monitor for signs and symptoms of infection  - Monitor lab/diagnostic results  - Monitor all insertion sites, i e  indwelling lines, tubes, and drains  - Monitor endotracheal if appropriate and nasal secretions for changes in amount and color  - [place] appropriate cooling/warming therapies per order  - Administer medications as ordered  - Instruct and encourage patient and family to use good hand hygiene technique  - Identify and instruct in appropriate isolation precautions for identified infection/condition  Outcome: Progressing     Problem: SAFETY ADULT  Goal: Patient will remain free of falls  Description: INTERVENTIONS:  - Educate patient/family on patient safety including physical limitations  - Instruct patient to call for assistance with activity   - Consult OT/PT to assist with strengthening/mobility   - Keep Call bell within reach  - Keep bed low and locked with side rails adjusted as appropriate  - Keep care items and personal belongings within reach  - Initiate and maintain comfort rounds  - Make Fall Risk Sign visible to staff  - Offer Toileting every 2 Hours, in advance of need  - Initiate/Maintain bed alarm  - Apply yellow socks and bracelet for high fall risk patients  - Consider moving patient to room near nurses station  Outcome: Progressing     Problem: DISCHARGE PLANNING  Goal: Discharge to home or other facility with appropriate resources  Description: INTERVENTIONS:  - Identify barriers to discharge w/patient and caregiver  - Arrange for needed discharge resources and transportation as appropriate  - Identify discharge learning needs (meds, wound care, etc )  - Arrange for interpretive services to assist at discharge as needed  - Refer to Case Management Department for coordinating discharge planning if the patient needs post-hospital services based on physician/advanced practitioner order or complex needs related to functional status, cognitive ability, or social support system  Outcome: Progressing     Problem: Knowledge Deficit  Goal: Patient/family/caregiver demonstrates understanding of disease process, treatment plan, medications, and discharge instructions  Description: Complete learning assessment and assess knowledge base    Interventions:  - Provide teaching at level of understanding  - Provide teaching via preferred learning methods  Outcome: Progressing     Problem: GASTROINTESTINAL - ADULT  Goal: Minimal or absence of nausea and/or vomiting  Description: INTERVENTIONS:  - Administer IV fluids if ordered to ensure adequate hydration  - Maintain NPO status until nausea and vomiting are resolved  - Nasogastric tube if ordered  - Administer ordered antiemetic medications as needed  - Provide nonpharmacologic comfort measures as appropriate  - Advance diet as tolerated, if ordered  - Consider nutrition services referral to assist patient with adequate nutrition and appropriate food choices  Outcome: Progressing  Goal: Maintains or returns to baseline bowel function  Description: INTERVENTIONS:  - Assess bowel function  - Encourage oral fluids to ensure adequate hydration  - Administer IV fluids if ordered to ensure adequate hydration  - Administer ordered medications as needed  - Encourage mobilization and activity  - Consider nutritional services referral to assist patient with adequate nutrition and appropriate food choices  Outcome: Progressing     Problem: GENITOURINARY - ADULT  Goal: Maintains or returns to baseline urinary function  Description: INTERVENTIONS:  - Assess urinary function  - Encourage oral fluids to ensure adequate hydration if ordered  - Administer IV fluids as ordered to ensure adequate hydration  - Administer ordered medications as needed  - Offer frequent toileting  - Follow urinary retention protocol if ordered  Outcome: Progressing  Goal: Absence of urinary retention  Description: INTERVENTIONS:  - Assess patient’s ability to void and empty bladder  - Monitor I/O  - Bladder scan as needed  - Discuss with physician/AP medications to alleviate retention as needed  - Discuss catheterization for long term situations as appropriate  Outcome: Progressing  Goal: Urinary catheter remains patent  Description: INTERVENTIONS:  - Assess patency of urinary catheter  - If patient has a chronic lomas, consider changing catheter if non-functioning  - Follow guidelines for intermittent irrigation of non-functioning urinary catheter  Outcome: Progressing     Problem: SKIN/TISSUE INTEGRITY - ADULT  Goal: Skin Integrity remains intact(Skin Breakdown Prevention)  Description: Assess:  -Perform Sameer assessment every shift  -Clean and moisturize skin every shift  -Inspect skin when repositioning, toileting, and assisting with ADLS  -Assess under medical devices such as scd sleeves every shift  -Assess extremities for adequate circulation and sensation     Bed Management:  -Have minimal linens on bed & keep smooth, unwrinkled  -Change linens as needed when moist or perspiring  -Avoid sitting or lying in one position for more than 2 hours while in bed  -Keep HOB at 30 degrees     Toileting:  -Offer bedside commode    Activity:  -Mobilize patient 4 times a day  -Encourage activity and walks on unit  -Encourage or provide ROM exercises   -Turn and reposition patient every 2 Hours  -Use appropriate equipment to lift or move patient in bed  -Instruct/ Assist with weight shifting every 2 hours when out of bed in chair  -Consider limitation of chair time 2 hour intervals    Skin Care:  -Avoid use of baby powder, tape, friction and shearing, hot water or constrictive clothing    Next Steps:  -Teach patient strategies to minimize risks such as incentive spirometer  -Consider consults to interdisciplinary teams such as N/A  Outcome: Progressing  Goal: Incision(s), wounds(s) or drain site(s) healing without S/S of infection  Description: INTERVENTIONS  - Assess and document dressing, incision, wound bed, drain sites and surrounding tissue  - Provide patient and family education  - Perform skin care/dressing changes   Outcome: Progressing

## 2023-06-14 NOTE — PROGRESS NOTES
"For questions/concerns on this patient, please reach out to the following:  [facility]- GynOn Resident   Gyn Oncology Progress note   Ward Kay 27 y o  female MRN: [id number]  Unit/Bed#: E5 -01 Encounter: [id number]    Assessment/Plan:    27 y o  POD#1 from ex lap, OCHOA/BSO, radical omentectomy, b/l SLND, appy and ureteral stent placement for ovarian mass with intraop frozen of at least serous borderline  * Status post total abdominal hysterectomy and bilateral salpingo-oophorectomy (OCHOA-BSO)  Assessment & Plan  Hgb 12 8 (5/22) -> 10 9  Pain: epidural, APS following  LDA: lomas in place, UOP 1 cc/kg/hr  Diet: clears  DVT ppx: SCDs, heparin SC q8    PONV (postoperative nausea and vomiting)  Assessment & Plan  Scopolamine patch and Zofran PRN    Pelvic mass  Assessment & Plan  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\"  Follow up final pathology           Subjective:    Jennifer Savage has no current complaints  Pain is well controlled with epidural   Patient is not currently voiding  She is not ambulating  Patient is not currently passing flatus and has had no bowel movement  She is tolerating clear liquids PO, and denies nausea or vomitting  Patient denies fever, chills, chest pain, shortness of breath, or calf tenderness  Objective:  /82 (BP Location: Left arm)   Pulse 69   Temp (!) 97 1 °F (36 2 °C) (Oral)   Resp 19   Ht 5' 7\" (1 702 m)   Wt 83 5 kg (184 lb 1 4 oz)   LMP 05/15/2023 (Exact Date)   SpO2 97%   BMI 28 83 kg/m²     I/O last 3 completed shifts: In: 6775 [I V :3685; IV Piggyback:50]  Out: 9587 [Urine:2325; Blood:450]  I/O this shift:   In: 505 2 [P O :480; I V :25 2]  Out: -     Lab Results   Component Value Date    HCT 35 2 06/14/2023    HGB 10 9 (L) 06/14/2023    MCV 92 06/14/2023     06/14/2023    WBC 11 35 (H) 06/14/2023       Lab Results   Component Value Date    BUN 7 06/14/2023    CALCIUM 7 6 (L) 06/14/2023     " 06/14/2023    CO2 23 06/14/2023    CREATININE 0 73 06/14/2023    K 4 1 06/14/2023           Physical Exam  Constitutional:       General: She is not in acute distress  Appearance: She is normal weight  HENT:      Mouth/Throat:      Mouth: Mucous membranes are moist       Pharynx: Oropharynx is clear  Cardiovascular:      Rate and Rhythm: Normal rate and regular rhythm  Pulmonary:      Effort: Pulmonary effort is normal  No respiratory distress  Abdominal:      General: There is no distension  Palpations: Abdomen is soft  Comments: Midline vertical incision c/d/i, covered with glue   Skin:     General: Skin is warm and dry  Neurological:      General: No focal deficit present  Mental Status: She is alert and oriented to person, place, and time  Mental status is at baseline             Mp Ewing MD  6/14/2023  8:52 AM

## 2023-06-14 NOTE — PROGRESS NOTES
Pt with moderate pain this morning  Epidural working better in right side  Pt encouraged to use demand button/tilt on left side  I ordered prn dilaudid and percocet  Continue epidural at current settings

## 2023-06-14 NOTE — PLAN OF CARE
Problem: MOBILITY - ADULT  Goal: Maintain or return to baseline ADL function  Description: INTERVENTIONS:  -  Assess patient's ability to carry out ADLs; assess patient's baseline for ADL function and identify physical deficits which impact ability to perform ADLs (bathing, care of mouth/teeth, toileting, grooming, dressing, etc )  - Assess/evaluate cause of self-care deficits   - Assess range of motion  - Assess patient's mobility; develop plan if impaired  - Assess patient's need for assistive devices and provide as appropriate  - Encourage maximum independence but intervene and supervise when necessary  - Involve family in performance of ADLs  - Assess for home care needs following discharge   - Consider OT consult to assist with ADL evaluation and planning for discharge  - Provide patient education as appropriate  Outcome: Progressing  Goal: Maintains/Returns to pre admission functional level  Description: INTERVENTIONS:  - Perform BMAT or MOVE assessment daily    - Set and communicate daily mobility goal to care team and patient/family/caregiver     - Collaborate with rehabilitation services on mobility goals if consulted  - Dangle patient 2 times a day  - Stand patient 3 times a day  - Ambulate patient 3 times a day  - Out of bed to chair 3 times a day   - Out of bed for meals 3 times a day  - Out of bed for toileting  - Record patient progress and toleration of activity level   Outcome: Progressing     Problem: PAIN - ADULT  Goal: Verbalizes/displays adequate comfort level or baseline comfort level  Description: Interventions:  - Encourage patient to monitor pain and request assistance  - Assess pain using appropriate pain scale  - Administer analgesics based on type and severity of pain and evaluate response  - Implement non-pharmacological measures as appropriate and evaluate response  - Consider cultural and social influences on pain and pain management  - Notify physician/advanced practitioner if interventions unsuccessful or patient reports new pain  Outcome: Progressing     Problem: INFECTION - ADULT  Goal: Absence or prevention of progression during hospitalization  Description: INTERVENTIONS:  - Assess and monitor for signs and symptoms of infection  - Monitor lab/diagnostic results  - Monitor all insertion sites, i e  indwelling lines, tubes, and drains  - Monitor endotracheal if appropriate and nasal secretions for changes in amount and color  - [place] appropriate cooling/warming therapies per order  - Administer medications as ordered  - Instruct and encourage patient and family to use good hand hygiene technique  - Identify and instruct in appropriate isolation precautions for identified infection/condition  Outcome: Progressing     Problem: SAFETY ADULT  Goal: Patient will remain free of falls  Description: INTERVENTIONS:  - Educate patient/family on patient safety including physical limitations  - Instruct patient to call for assistance with activity   - Consult OT/PT to assist with strengthening/mobility   - Keep Call bell within reach  - Keep bed low and locked with side rails adjusted as appropriate  - Keep care items and personal belongings within reach  - Initiate and maintain comfort rounds  - Make Fall Risk Sign visible to staff  - Offer Toileting every 2 Hours, in advance of need  - Initiate/Maintain bed alarm  - Apply yellow socks and bracelet for high fall risk patients  - Consider moving patient to room near nurses station  Outcome: Progressing     Problem: DISCHARGE PLANNING  Goal: Discharge to home or other facility with appropriate resources  Description: INTERVENTIONS:  - Identify barriers to discharge w/patient and caregiver  - Arrange for needed discharge resources and transportation as appropriate  - Identify discharge learning needs (meds, wound care, etc )  - Arrange for interpretive services to assist at discharge as needed  - Refer to Case Management Department for coordinating discharge planning if the patient needs post-hospital services based on physician/advanced practitioner order or complex needs related to functional status, cognitive ability, or social support system  Outcome: Progressing     Problem: Knowledge Deficit  Goal: Patient/family/caregiver demonstrates understanding of disease process, treatment plan, medications, and discharge instructions  Description: Complete learning assessment and assess knowledge base    Interventions:  - Provide teaching at level of understanding  - Provide teaching via preferred learning methods  Outcome: Progressing     Problem: GASTROINTESTINAL - ADULT  Goal: Minimal or absence of nausea and/or vomiting  Description: INTERVENTIONS:  - Administer IV fluids if ordered to ensure adequate hydration  - Maintain NPO status until nausea and vomiting are resolved  - Nasogastric tube if ordered  - Administer ordered antiemetic medications as needed  - Provide nonpharmacologic comfort measures as appropriate  - Advance diet as tolerated, if ordered  - Consider nutrition services referral to assist patient with adequate nutrition and appropriate food choices  Outcome: Progressing  Goal: Maintains or returns to baseline bowel function  Description: INTERVENTIONS:  - Assess bowel function  - Encourage oral fluids to ensure adequate hydration  - Administer IV fluids if ordered to ensure adequate hydration  - Administer ordered medications as needed  - Encourage mobilization and activity  - Consider nutritional services referral to assist patient with adequate nutrition and appropriate food choices  Outcome: Progressing     Problem: GENITOURINARY - ADULT  Goal: Maintains or returns to baseline urinary function  Description: INTERVENTIONS:  - Assess urinary function  - Encourage oral fluids to ensure adequate hydration if ordered  - Administer IV fluids as ordered to ensure adequate hydration  - Administer ordered medications as needed  - Offer frequent toileting  - Follow urinary retention protocol if ordered  Outcome: Progressing  Goal: Absence of urinary retention  Description: INTERVENTIONS:  - Assess patient’s ability to void and empty bladder  - Monitor I/O  - Bladder scan as needed  - Discuss with physician/AP medications to alleviate retention as needed  - Discuss catheterization for long term situations as appropriate  Outcome: Progressing  Goal: Urinary catheter remains patent  Description: INTERVENTIONS:  - Assess patency of urinary catheter  - If patient has a chronic lomas, consider changing catheter if non-functioning  - Follow guidelines for intermittent irrigation of non-functioning urinary catheter  Outcome: Progressing     Problem: SKIN/TISSUE INTEGRITY - ADULT  Goal: Skin Integrity remains intact(Skin Breakdown Prevention)  Description: Assess:  -Perform Sameer assessment every shift  -Clean and moisturize skin every shift  -Inspect skin when repositioning, toileting, and assisting with ADLS  -Assess under medical devices such as scd sleeves every shift  -Assess extremities for adequate circulation and sensation     Bed Management:  -Have minimal linens on bed & keep smooth, unwrinkled  -Change linens as needed when moist or perspiring  -Avoid sitting or lying in one position for more than 2 hours while in bed  -Keep HOB at 30 degrees     Toileting:  -Offer bedside commode    Activity:  -Mobilize patient 4 times a day  -Encourage activity and walks on unit  -Encourage or provide ROM exercises   -Turn and reposition patient every 2 Hours  -Use appropriate equipment to lift or move patient in bed  -Instruct/ Assist with weight shifting every 2 hours when out of bed in chair  -Consider limitation of chair time 2 hour intervals    Skin Care:  -Avoid use of baby powder, tape, friction and shearing, hot water or constrictive clothing    Next Steps:  -Teach patient strategies to minimize risks such as incentive spirometer  -Consider consults to interdisciplinary teams such as N/A  Outcome: Progressing  Goal: Incision(s), wounds(s) or drain site(s) healing without S/S of infection  Description: INTERVENTIONS  - Assess and document dressing, incision, wound bed, drain sites and surrounding tissue  - Provide patient and family education  Outcome: Progressing

## 2023-06-15 LAB
ANION GAP SERPL CALCULATED.3IONS-SCNC: 5 MMOL/L (ref 4–13)
BASOPHILS # BLD AUTO: 0.06 THOUSANDS/ÂΜL (ref 0–0.1)
BASOPHILS NFR BLD AUTO: 1 % (ref 0–1)
BUN SERPL-MCNC: 4 MG/DL (ref 5–25)
CALCIUM SERPL-MCNC: 7.8 MG/DL (ref 8.4–10.2)
CHLORIDE SERPL-SCNC: 107 MMOL/L (ref 96–108)
CO2 SERPL-SCNC: 25 MMOL/L (ref 21–32)
CREAT SERPL-MCNC: 0.73 MG/DL (ref 0.6–1.3)
EOSINOPHIL # BLD AUTO: 0.46 THOUSAND/ÂΜL (ref 0–0.61)
EOSINOPHIL NFR BLD AUTO: 5 % (ref 0–6)
ERYTHROCYTE [DISTWIDTH] IN BLOOD BY AUTOMATED COUNT: 13.8 % (ref 11.6–15.1)
GFR SERPL CREATININE-BSD FRML MDRD: 107 ML/MIN/1.73SQ M
GLUCOSE SERPL-MCNC: 82 MG/DL (ref 65–140)
HCT VFR BLD AUTO: 34.4 % (ref 34.8–46.1)
HGB BLD-MCNC: 10.7 G/DL (ref 11.5–15.4)
IMM GRANULOCYTES # BLD AUTO: 0.04 THOUSAND/UL (ref 0–0.2)
IMM GRANULOCYTES NFR BLD AUTO: 0 % (ref 0–2)
LYMPHOCYTES # BLD AUTO: 2.67 THOUSANDS/ÂΜL (ref 0.6–4.47)
LYMPHOCYTES NFR BLD AUTO: 26 % (ref 14–44)
MCH RBC QN AUTO: 29.1 PG (ref 26.8–34.3)
MCHC RBC AUTO-ENTMCNC: 31.1 G/DL (ref 31.4–37.4)
MCV RBC AUTO: 94 FL (ref 82–98)
MONOCYTES # BLD AUTO: 0.78 THOUSAND/ÂΜL (ref 0.17–1.22)
MONOCYTES NFR BLD AUTO: 8 % (ref 4–12)
NEUTROPHILS # BLD AUTO: 6.22 THOUSANDS/ÂΜL (ref 1.85–7.62)
NEUTS SEG NFR BLD AUTO: 60 % (ref 43–75)
NRBC BLD AUTO-RTO: 0 /100 WBCS
PLATELET # BLD AUTO: 320 THOUSANDS/UL (ref 149–390)
PMV BLD AUTO: 10.5 FL (ref 8.9–12.7)
POTASSIUM SERPL-SCNC: 3.6 MMOL/L (ref 3.5–5.3)
RBC # BLD AUTO: 3.68 MILLION/UL (ref 3.81–5.12)
SODIUM SERPL-SCNC: 137 MMOL/L (ref 135–147)
WBC # BLD AUTO: 10.23 THOUSAND/UL (ref 4.31–10.16)

## 2023-06-15 PROCEDURE — 80048 BASIC METABOLIC PNL TOTAL CA: CPT | Performed by: OBSTETRICS & GYNECOLOGY

## 2023-06-15 PROCEDURE — 99024 POSTOP FOLLOW-UP VISIT: CPT | Performed by: OBSTETRICS & GYNECOLOGY

## 2023-06-15 PROCEDURE — 85025 COMPLETE CBC W/AUTO DIFF WBC: CPT | Performed by: OBSTETRICS & GYNECOLOGY

## 2023-06-15 RX ORDER — ACETAMINOPHEN 325 MG/1
975 TABLET ORAL EVERY 8 HOURS SCHEDULED
Status: DISCONTINUED | OUTPATIENT
Start: 2023-06-15 | End: 2023-06-16 | Stop reason: HOSPADM

## 2023-06-15 RX ORDER — ACETAMINOPHEN 325 MG/1
650 TABLET ORAL EVERY 6 HOURS SCHEDULED
Status: DISCONTINUED | OUTPATIENT
Start: 2023-06-15 | End: 2023-06-15

## 2023-06-15 RX ORDER — MAGNESIUM SULFATE HEPTAHYDRATE 40 MG/ML
2 INJECTION, SOLUTION INTRAVENOUS ONCE
Status: COMPLETED | OUTPATIENT
Start: 2023-06-15 | End: 2023-06-16

## 2023-06-15 RX ORDER — SIMETHICONE 80 MG
80 TABLET,CHEWABLE ORAL EVERY 6 HOURS PRN
Status: DISCONTINUED | OUTPATIENT
Start: 2023-06-15 | End: 2023-06-16 | Stop reason: HOSPADM

## 2023-06-15 RX ORDER — OXYCODONE HYDROCHLORIDE 5 MG/1
5 TABLET ORAL EVERY 4 HOURS PRN
Status: DISCONTINUED | OUTPATIENT
Start: 2023-06-15 | End: 2023-06-16 | Stop reason: HOSPADM

## 2023-06-15 RX ORDER — HYDROMORPHONE HCL/PF 1 MG/ML
0.5 SYRINGE (ML) INJECTION EVERY 4 HOURS PRN
Status: DISCONTINUED | OUTPATIENT
Start: 2023-06-15 | End: 2023-06-16 | Stop reason: HOSPADM

## 2023-06-15 RX ORDER — HEPARIN SODIUM 5000 [USP'U]/ML
5000 INJECTION, SOLUTION INTRAVENOUS; SUBCUTANEOUS EVERY 8 HOURS SCHEDULED
Status: DISCONTINUED | OUTPATIENT
Start: 2023-06-15 | End: 2023-06-16 | Stop reason: HOSPADM

## 2023-06-15 RX ORDER — CALCIUM CARBONATE 500 MG/1
500 TABLET, CHEWABLE ORAL 3 TIMES DAILY PRN
Status: DISCONTINUED | OUTPATIENT
Start: 2023-06-15 | End: 2023-06-16 | Stop reason: HOSPADM

## 2023-06-15 RX ORDER — IBUPROFEN 600 MG/1
600 TABLET ORAL EVERY 6 HOURS SCHEDULED
Status: DISCONTINUED | OUTPATIENT
Start: 2023-06-15 | End: 2023-06-16 | Stop reason: HOSPADM

## 2023-06-15 RX ADMIN — OXYCODONE HYDROCHLORIDE 5 MG: 5 TABLET ORAL at 13:17

## 2023-06-15 RX ADMIN — OXYCODONE HYDROCHLORIDE 5 MG: 5 TABLET ORAL at 17:13

## 2023-06-15 RX ADMIN — DOCUSATE SODIUM 100 MG: 100 CAPSULE, LIQUID FILLED ORAL at 09:04

## 2023-06-15 RX ADMIN — DOCUSATE SODIUM 100 MG: 100 CAPSULE, LIQUID FILLED ORAL at 17:13

## 2023-06-15 RX ADMIN — MAGNESIUM SULFATE HEPTAHYDRATE 2 G: 40 INJECTION, SOLUTION INTRAVENOUS at 09:04

## 2023-06-15 RX ADMIN — OXYCODONE HYDROCHLORIDE 5 MG: 5 TABLET ORAL at 21:54

## 2023-06-15 RX ADMIN — HEPARIN SODIUM 5000 UNITS: 5000 INJECTION INTRAVENOUS; SUBCUTANEOUS at 21:57

## 2023-06-15 RX ADMIN — HEPARIN SODIUM 5000 UNITS: 5000 INJECTION INTRAVENOUS; SUBCUTANEOUS at 17:13

## 2023-06-15 RX ADMIN — SIMETHICONE 80 MG: 80 TABLET, CHEWABLE ORAL at 02:16

## 2023-06-15 RX ADMIN — SODIUM CHLORIDE 125 ML/HR: 0.9 INJECTION, SOLUTION INTRAVENOUS at 14:56

## 2023-06-15 RX ADMIN — ACETAMINOPHEN 650 MG: 325 TABLET ORAL at 02:16

## 2023-06-15 RX ADMIN — OXYCODONE HYDROCHLORIDE 5 MG: 5 TABLET ORAL at 09:04

## 2023-06-15 RX ADMIN — ROPIVACAINE HYDROCHLORIDE: 2 INJECTION, SOLUTION EPIDURAL; INFILTRATION at 04:03

## 2023-06-15 RX ADMIN — IBUPROFEN 600 MG: 600 TABLET ORAL at 17:13

## 2023-06-15 RX ADMIN — IBUPROFEN 600 MG: 600 TABLET ORAL at 02:16

## 2023-06-15 RX ADMIN — HYDROMORPHONE HYDROCHLORIDE 0.5 MG: 1 INJECTION, SOLUTION INTRAMUSCULAR; INTRAVENOUS; SUBCUTANEOUS at 14:17

## 2023-06-15 RX ADMIN — IBUPROFEN 600 MG: 600 TABLET ORAL at 09:04

## 2023-06-15 RX ADMIN — ACETAMINOPHEN 325MG 975 MG: 325 TABLET ORAL at 13:17

## 2023-06-15 RX ADMIN — IBUPROFEN 600 MG: 600 TABLET ORAL at 23:30

## 2023-06-15 RX ADMIN — ACETAMINOPHEN 325MG 975 MG: 325 TABLET ORAL at 21:54

## 2023-06-15 RX ADMIN — HYDROMORPHONE HYDROCHLORIDE 0.5 MG: 1 INJECTION, SOLUTION INTRAMUSCULAR; INTRAVENOUS; SUBCUTANEOUS at 19:17

## 2023-06-15 NOTE — UTILIZATION REVIEW
Elective Surgical Continued Stay Review    Date:    for  6/14 and  6/15        POD#:    1   And  2      Current Patient Class:   Inpatient  Current Level of Care:   Med surg    Assessment/Plan: 27 y o  female, initial surgery date    6/13      Bilateral - CYSTO; STENT URETERAL  EX LAP  OCHOA/BSO  STAGING radical omentectomy bilateral pelvic lymph node dissection appendectomy     6/14     POD  #  1  Pain is well controlled with epidural   Pt is not currently voiding   She is not ambulating   (-) flatus, (-) BM  She is tolerating clear liquids, and denies N/V  Hgb 10 9, continue to monitor for ABLA  APS following, continue epidural  Dee in place  Continue clear liquid diet  Incentive spirometry  SQH/SCDs  Scopolamine patch and Zofran PRN  F/u on final path from OR      6/15    POD   #  2  Epidural  Catheter intact,may  D/c this pm    Pain  Much improved today  Dee  Catheter  Remains in, may also d/c  This pm   + flatus    - BM      Has not been OOB        Pertinent Labs/Diagnostic Results:       Results from last 7 days   Lab Units 06/15/23  0448 06/14/23  0528   HEMATOCRIT % 34 4* 35 2   HEMOGLOBIN g/dL 10 7* 10 9*   NEUTROS ABS Thousands/µL 6 22 7 61   PLATELETS Thousands/uL 320 320   WBC Thousand/uL 10 23* 11 35*         Results from last 7 days   Lab Units 06/15/23  0448 06/14/23  0528   ANION GAP mmol/L 5 6   BUN mg/dL 4* 7   CALCIUM mg/dL 7 8* 7 6*   CHLORIDE mmol/L 107 108   CO2 mmol/L 25 23   CREATININE mg/dL 0 73 0 73   EGFR ml/min/1 73sq m 107 107   POTASSIUM mmol/L 3 6 4 1   MAGNESIUM mg/dL  --  1 7*   SODIUM mmol/L 137 137             Results from last 7 days   Lab Units 06/15/23  0448 06/14/23  0528   GLUCOSE RANDOM mg/dL 82 98         Vital Signs:   98 1 °F (36 7 °C) 74 19 140/91 107 96 % None (Room air) -- Lying    06/15/23 03:58:18 -- 68 17 124/72 89 98 % -- -- --   06/14/23 23:09:52 98 6 °F (37 °C) 79 18 119/82 94 98 % -- -- --   06/14/23 15:29:37 98 6 °F (37 °C) 79 19 114/72 86 98 % None (Room air) -- Lying   06/14/23 13:13:37 97 7 °F (36 5 °C) 80 17 124/68 87 97 % -- -- --   06/14/23 09:12:29 97 2 °F (36 2 °C) Abnormal  70 16 134/85 101 97 % -- -- Lying   06/14/23 07:37:13 97 1 °F (36 2 °C) Abnormal  69 19 125/82 96 97 % None (Room air) -- Lying   06/14/23 03:02:10 98 °F (36 7 °C) 61 18 133/82 99 97 % -- -- --   06/14/23 0300 98 °F (36 7 °C) 62 -- 133/82 99 97 % -- -- --   06/14/23 0200 -- 68 -- 123/83 96 97 % -- -- --   06/14/23 01:49:34 97 3 °F (36 3 °C) Abnormal  75 22 123/83 96 92 % --           Medications:   Scheduled Medications:  acetaminophen, 975 mg, Oral, Q8H RUBY  docusate sodium, 100 mg, Oral, BID  ibuprofen, 600 mg, Oral, Q6H [facility]  scopolamine, 1 patch, Transdermal, Q72H      Continuous IV Infusions:  sodium chloride, 125 mL/hr, Intravenous, Continuous      PRN Meds:  calcium carbonate, 500 mg, Oral, TID PRN  HYDROmorphone, 0 5 mg, Intravenous, Q4H PRN  (  X  6  6/14  And X 1  6/15   Thus far)    ondansetron, 4 mg, Intravenous, Q6H PRN  oxyCODONE, 5 mg, Oral, Q4H PRN  oxyCODONE, 2 5 mg, Oral, Q4H PRN  simethicone, 80 mg, Oral, Q6H PRN          Discharge Plan:    D    Network Utilization Review Department  ATTENTION: Please call with any questions or concerns to [phone] and carefully listen to the prompts so that you are directed to the right person  All voicemails are confidential   Coral Query all requests for admission clinical reviews, approved or denied determinations and any other requests to dedicated fax number below belonging to the campus where the patient is receiving treatment   List of dedicated fax numbers for the Facilities:  [address] DENIALS (Administrative/Medical Necessity) [phone]   [address]  (Maternity/NICU/Pediatrics) Shanique Barreto [facility] [phone]   [place] [address] [phone]   [place] [phone]   [address] [place] [address] [address] [address] [facility]ot Garay [facility] [address] [facility] 134 435 [facility] [phone]

## 2023-06-15 NOTE — PROGRESS NOTES
Pt's analgesia adequate with epidural off x 4 hrs  Epidural removed tip intact  Continue analgesia as per GYN/ONC service  Ok to restart sq heparin immediately

## 2023-06-15 NOTE — PLAN OF CARE
Problem: MOBILITY - ADULT  Goal: Maintain or return to baseline ADL function  Description: INTERVENTIONS:  -  Assess patient's ability to carry out ADLs; assess patient's baseline for ADL function and identify physical deficits which impact ability to perform ADLs (bathing, care of mouth/teeth, toileting, grooming, dressing, etc )  - Assess/evaluate cause of self-care deficits   - Assess range of motion  - Assess patient's mobility; develop plan if impaired  - Assess patient's need for assistive devices and provide as appropriate  - Encourage maximum independence but intervene and supervise when necessary  - Involve family in performance of ADLs  - Assess for home care needs following discharge   - Consider OT consult to assist with ADL evaluation and planning for discharge  - Provide patient education as appropriate  Outcome: Progressing  Goal: Maintains/Returns to pre admission functional level  Description: INTERVENTIONS:  - Perform BMAT or MOVE assessment daily    - Set and communicate daily mobility goal to care team and patient/family/caregiver     - Collaborate with rehabilitation services on mobility goals if consulted  - Dangle patient 2 times a day  - Stand patient 3 times a day  - Ambulate patient 3 times a day  - Out of bed to chair 3 times a day   - Out of bed for meals 3 times a day  - Out of bed for toileting  - Record patient progress and toleration of activity level   Outcome: Progressing     Problem: PAIN - ADULT  Goal: Verbalizes/displays adequate comfort level or baseline comfort level  Description: Interventions:  - Encourage patient to monitor pain and request assistance  - Assess pain using appropriate pain scale  - Administer analgesics based on type and severity of pain and evaluate response  - Implement non-pharmacological measures as appropriate and evaluate response  - Consider cultural and social influences on pain and pain management  - Notify physician/advanced practitioner if interventions unsuccessful or patient reports new pain  Outcome: Progressing     Problem: INFECTION - ADULT  Goal: Absence or prevention of progression during hospitalization  Description: INTERVENTIONS:  - Assess and monitor for signs and symptoms of infection  - Monitor lab/diagnostic results  - Monitor all insertion sites, i e  indwelling lines, tubes, and drains  - Monitor endotracheal if appropriate and nasal secretions for changes in amount and color  - [place] appropriate cooling/warming therapies per order  - Administer medications as ordered  - Instruct and encourage patient and family to use good hand hygiene technique  - Identify and instruct in appropriate isolation precautions for identified infection/condition  Outcome: Progressing     Problem: SAFETY ADULT  Goal: Patient will remain free of falls  Description: INTERVENTIONS:  - Educate patient/family on patient safety including physical limitations  - Instruct patient to call for assistance with activity   - Consult OT/PT to assist with strengthening/mobility   - Keep Call bell within reach  - Keep bed low and locked with side rails adjusted as appropriate  - Keep care items and personal belongings within reach  - Initiate and maintain comfort rounds  - Make Fall Risk Sign visible to staff  - Offer Toileting every 2 Hours, in advance of need  - Initiate/Maintain bed alarm  - Apply yellow socks and bracelet for high fall risk patients  - Consider moving patient to room near nurses station  Outcome: Progressing     Problem: DISCHARGE PLANNING  Goal: Discharge to home or other facility with appropriate resources  Description: INTERVENTIONS:  - Identify barriers to discharge w/patient and caregiver  - Arrange for needed discharge resources and transportation as appropriate  - Identify discharge learning needs (meds, wound care, etc )  - Arrange for interpretive services to assist at discharge as needed  - Refer to Case Management Department for coordinating discharge planning if the patient needs post-hospital services based on physician/advanced practitioner order or complex needs related to functional status, cognitive ability, or social support system  Outcome: Progressing     Problem: Knowledge Deficit  Goal: Patient/family/caregiver demonstrates understanding of disease process, treatment plan, medications, and discharge instructions  Description: Complete learning assessment and assess knowledge base    Interventions:  - Provide teaching at level of understanding  - Provide teaching via preferred learning methods  Outcome: Progressing     Problem: GASTROINTESTINAL - ADULT  Goal: Minimal or absence of nausea and/or vomiting  Description: INTERVENTIONS:  - Administer IV fluids if ordered to ensure adequate hydration  - Maintain NPO status until nausea and vomiting are resolved  - Nasogastric tube if ordered  - Administer ordered antiemetic medications as needed  - Provide nonpharmacologic comfort measures as appropriate  - Advance diet as tolerated, if ordered  - Consider nutrition services referral to assist patient with adequate nutrition and appropriate food choices  Outcome: Progressing  Goal: Maintains or returns to baseline bowel function  Description: INTERVENTIONS:  - Assess bowel function  - Encourage oral fluids to ensure adequate hydration  - Administer IV fluids if ordered to ensure adequate hydration  - Administer ordered medications as needed  - Encourage mobilization and activity  - Consider nutritional services referral to assist patient with adequate nutrition and appropriate food choices  Outcome: Progressing     Problem: GENITOURINARY - ADULT  Goal: Maintains or returns to baseline urinary function  Description: INTERVENTIONS:  - Assess urinary function  - Encourage oral fluids to ensure adequate hydration if ordered  - Administer IV fluids as ordered to ensure adequate hydration  - Administer ordered medications as needed  - Offer frequent toileting  - Follow urinary retention protocol if ordered  Outcome: Progressing  Goal: Absence of urinary retention  Description: INTERVENTIONS:  - Assess patient’s ability to void and empty bladder  - Monitor I/O  - Bladder scan as needed  - Discuss with physician/AP medications to alleviate retention as needed  - Discuss catheterization for long term situations as appropriate  Outcome: Progressing  Goal: Urinary catheter remains patent  Description: INTERVENTIONS:  - Assess patency of urinary catheter  - If patient has a chronic lomas, consider changing catheter if non-functioning  - Follow guidelines for intermittent irrigation of non-functioning urinary catheter  Outcome: Progressing     Problem: SKIN/TISSUE INTEGRITY - ADULT  Goal: Skin Integrity remains intact(Skin Breakdown Prevention)  Description: Assess:  -Perform Sameer assessment every shift  -Clean and moisturize skin every shift  -Inspect skin when repositioning, toileting, and assisting with ADLS  -Assess under medical devices such as scd sleeves every shift  -Assess extremities for adequate circulation and sensation     Bed Management:  -Have minimal linens on bed & keep smooth, unwrinkled  -Change linens as needed when moist or perspiring  -Avoid sitting or lying in one position for more than 2 hours while in bed  -Keep HOB at 30 degrees     Toileting:  -Offer bedside commode    Activity:  -Mobilize patient 4 times a day  -Encourage activity and walks on unit  -Encourage or provide ROM exercises   -Turn and reposition patient every 2 Hours  -Use appropriate equipment to lift or move patient in bed  -Instruct/ Assist with weight shifting every 2 hours when out of bed in chair  -Consider limitation of chair time 2 hour intervals    Skin Care:  -Avoid use of baby powder, tape, friction and shearing, hot water or constrictive clothing    Next Steps:  -Teach patient strategies to minimize risks such as incentive spirometer  -Consider consults to interdisciplinary teams such as N/A  Outcome: Progressing  Goal: Incision(s), wounds(s) or drain site(s) healing without S/S of infection  Description: INTERVENTIONS  - Assess and document dressing, incision, wound bed, drain sites and surrounding tissue  - Provide patient and family education  - Perform skin care/dressing changes every as ordered and as needed  Outcome: Progressing

## 2023-06-15 NOTE — ADDENDUM NOTE
Addendum  created 06/15/23 1113 by Je Cooper DO    Clinical Note Signed, LDA properties accepted, Order list changed

## 2023-06-15 NOTE — RESTORATIVE TECHNICIAN NOTE
Restorative Technician Note      Patient Name: Arthur Florian     Restorative Tech Visit Date: 06/15/23  Note Type: Mobility  Patient Position Upon Consult: Supine  Activity Performed: Ambulated  Assistive Device: Other (Comment) (no device)  Patient Position at End of Consult: All needs within reach;  Supine

## 2023-06-15 NOTE — PROGRESS NOTES
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  * Status post total abdominal hysterectomy and bilateral salpingo-oophorectomy (OCHOA-BSO)  Assessment & Plan  Hgb 12 8 (5/22) -> 10 9 -> 10 7  Pain: epidural turned off but left in place this morning, will reeval pain around noon, sched tylenol with elvira 2 5/5 PRN and dilaudid for BT  LDA: lomas in place, will remove for void trial this afternoon if epidural removed  Diet: regular  DVT ppx: SCDs, heparin held    Anticpate DC later today if pain controlled on PO meds, voiding and able to ambulate    PONV (postoperative nausea and vomiting)  Assessment & Plan  Scopolamine patch and Zofran PRN    Pelvic mass  Assessment & Plan  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\"  Follow up final pathology           Subjective:    Jesse Mathews has no current complaints  Pain is well controlled with epidural   Patient is not currently voiding  She is not ambulating  Patient is currently passing flatus and has had no bowel movement  She is tolerating PO, and denies nausea or vomitting  Patient denies fever, chills, chest pain, shortness of breath, or calf tenderness  Objective:  /72   Pulse 68   Temp 98 6 °F (37 °C)   Resp 17   Ht 5' 7\" (1 702 m)   Wt 83 5 kg (184 lb 1 4 oz)   LMP 05/15/2023 (Exact Date)   SpO2 98%   BMI 28 83 kg/m²     I/O last 3 completed shifts: In: 1889 2 [P O :480; I V :1409 2]  Out: 5300 [Urine:5300]  No intake/output data recorded      Lab Results   Component Value Date    HCT 34 4 (L) 06/15/2023    HGB 10 7 (L) 06/15/2023    MCV 94 " 06/15/2023     06/15/2023    WBC 10 23 (H) 06/15/2023       Lab Results   Component Value Date    BUN 4 (L) 06/15/2023    CALCIUM 7 8 (L) 06/15/2023     06/15/2023    CO2 25 06/15/2023    CREATININE 0 73 06/15/2023    K 3 6 06/15/2023           Physical Exam  Constitutional:       General: She is not in acute distress  Appearance: She is normal weight  HENT:      Mouth/Throat:      Mouth: Mucous membranes are moist       Pharynx: Oropharynx is clear  Cardiovascular:      Rate and Rhythm: Normal rate and regular rhythm  Pulmonary:      Effort: Pulmonary effort is normal  No respiratory distress  Abdominal:      General: There is no distension  Palpations: Abdomen is soft  Comments: Midline vertical incision c/d/i, covered with glue   Skin:     General: Skin is warm and dry  Neurological:      General: No focal deficit present  Mental Status: She is alert and oriented to person, place, and time  Mental status is at baseline             Maria Esther Neff MD  6/15/2023  7:27 AM

## 2023-06-15 NOTE — PLAN OF CARE
Problem: MOBILITY - ADULT  Goal: Maintain or return to baseline ADL function  Description: INTERVENTIONS:  -  Assess patient's ability to carry out ADLs; assess patient's baseline for ADL function and identify physical deficits which impact ability to perform ADLs (bathing, care of mouth/teeth, toileting, grooming, dressing, etc )  - Assess/evaluate cause of self-care deficits   - Assess range of motion  - Assess patient's mobility; develop plan if impaired  - Assess patient's need for assistive devices and provide as appropriate  - Encourage maximum independence but intervene and supervise when necessary  - Involve family in performance of ADLs  - Assess for home care needs following discharge   - Consider OT consult to assist with ADL evaluation and planning for discharge  - Provide patient education as appropriate  Outcome: Progressing     Problem: PAIN - ADULT  Goal: Verbalizes/displays adequate comfort level or baseline comfort level  Description: Interventions:  - Encourage patient to monitor pain and request assistance  - Assess pain using appropriate pain scale  - Administer analgesics based on type and severity of pain and evaluate response  - Implement non-pharmacological measures as appropriate and evaluate response  - Consider cultural and social influences on pain and pain management  - Notify physician/advanced practitioner if interventions unsuccessful or patient reports new pain  Outcome: Progressing     Problem: INFECTION - ADULT  Goal: Absence or prevention of progression during hospitalization  Description: INTERVENTIONS:  - Assess and monitor for signs and symptoms of infection  - Monitor lab/diagnostic results  - Monitor all insertion sites, i e  indwelling lines, tubes, and drains  - Monitor endotracheal if appropriate and nasal secretions for changes in amount and color  - [place] appropriate cooling/warming therapies per order  - Administer medications as ordered  - Instruct and encourage patient and family to use good hand hygiene technique  - Identify and instruct in appropriate isolation precautions for identified infection/condition  Outcome: Progressing     Problem: SAFETY ADULT  Goal: Patient will remain free of falls  Description: INTERVENTIONS:  - Educate patient/family on patient safety including physical limitations  - Instruct patient to call for assistance with activity   - Consult OT/PT to assist with strengthening/mobility   - Keep Call bell within reach  - Keep bed low and locked with side rails adjusted as appropriate  - Keep care items and personal belongings within reach  - Initiate and maintain comfort rounds  - Make Fall Risk Sign visible to staff  - Offer Toileting every 2 Hours, in advance of need  - Initiate/Maintain bed alarm  - Apply yellow socks and bracelet for high fall risk patients  - Consider moving patient to room near nurses station  Outcome: Progressing     Problem: DISCHARGE PLANNING  Goal: Discharge to home or other facility with appropriate resources  Description: INTERVENTIONS:  - Identify barriers to discharge w/patient and caregiver  - Arrange for needed discharge resources and transportation as appropriate  - Identify discharge learning needs (meds, wound care, etc )  - Arrange for interpretive services to assist at discharge as needed  - Refer to Case Management Department for coordinating discharge planning if the patient needs post-hospital services based on physician/advanced practitioner order or complex needs related to functional status, cognitive ability, or social support system  Outcome: Progressing     Problem: Knowledge Deficit  Goal: Patient/family/caregiver demonstrates understanding of disease process, treatment plan, medications, and discharge instructions  Description: Complete learning assessment and assess knowledge base    Interventions:  - Provide teaching at level of understanding  - Provide teaching via preferred learning methods  Outcome: Progressing     Problem: GASTROINTESTINAL - ADULT  Goal: Minimal or absence of nausea and/or vomiting  Description: INTERVENTIONS:  - Administer IV fluids if ordered to ensure adequate hydration  - Maintain NPO status until nausea and vomiting are resolved  - Nasogastric tube if ordered  - Administer ordered antiemetic medications as needed  - Provide nonpharmacologic comfort measures as appropriate  - Advance diet as tolerated, if ordered  - Consider nutrition services referral to assist patient with adequate nutrition and appropriate food choices  Outcome: Progressing  Goal: Maintains or returns to baseline bowel function  Description: INTERVENTIONS:  - Assess bowel function  - Encourage oral fluids to ensure adequate hydration  - Administer IV fluids if ordered to ensure adequate hydration  - Administer ordered medications as needed  - Encourage mobilization and activity  - Consider nutritional services referral to assist patient with adequate nutrition and appropriate food choices  Outcome: Progressing     Problem: GENITOURINARY - ADULT  Goal: Maintains or returns to baseline urinary function  Description: INTERVENTIONS:  - Assess urinary function  - Encourage oral fluids to ensure adequate hydration if ordered  - Administer IV fluids as ordered to ensure adequate hydration  - Administer ordered medications as needed  - Offer frequent toileting  - Follow urinary retention protocol if ordered  Outcome: Progressing  Goal: Absence of urinary retention  Description: INTERVENTIONS:  - Assess patient’s ability to void and empty bladder  - Monitor I/O  - Bladder scan as needed  - Discuss with physician/AP medications to alleviate retention as needed  - Discuss catheterization for long term situations as appropriate  Outcome: Progressing     Problem: SKIN/TISSUE INTEGRITY - ADULT  Goal: Incision(s), wounds(s) or drain site(s) healing without S/S of infection  Description: INTERVENTIONS  - Assess and document dressing, incision, wound bed, drain sites and surrounding tissue  - Provide patient and family education  - Perform skin care/dressing changes every shift  Outcome: Progressing

## 2023-06-15 NOTE — ANESTHESIA POST-OP FOLLOW-UP NOTE
Patient: Paticia Artist  Procedure(s):  CYSTO; STENT URETERAL  EX LAP  OCHOA/BSO, STAGING radical omentectomy bilateral pelvic lymph node dissection  Vitals:    06/15/23 0358   BP: 124/72   Pulse: 68   Resp: 17   Temp:    SpO2: 98%       Patient doing better this am   Prefer to maintain epidural  Will reevaluate after breakfast

## 2023-06-16 VITALS
TEMPERATURE: 97.9 F | DIASTOLIC BLOOD PRESSURE: 95 MMHG | RESPIRATION RATE: 18 BRPM | SYSTOLIC BLOOD PRESSURE: 142 MMHG | HEIGHT: 67 IN | HEART RATE: 70 BPM | BODY MASS INDEX: 28.89 KG/M2 | WEIGHT: 184.08 LBS | OXYGEN SATURATION: 96 %

## 2023-06-16 LAB
ANION GAP SERPL CALCULATED.3IONS-SCNC: 5 MMOL/L (ref 4–13)
BASOPHILS # BLD AUTO: 0.05 THOUSANDS/ÂΜL (ref 0–0.1)
BASOPHILS NFR BLD AUTO: 1 % (ref 0–1)
BUN SERPL-MCNC: 5 MG/DL (ref 5–25)
CALCIUM SERPL-MCNC: 7.9 MG/DL (ref 8.4–10.2)
CHLORIDE SERPL-SCNC: 108 MMOL/L (ref 96–108)
CO2 SERPL-SCNC: 25 MMOL/L (ref 21–32)
CREAT SERPL-MCNC: 0.71 MG/DL (ref 0.6–1.3)
EOSINOPHIL # BLD AUTO: 0.52 THOUSAND/ÂΜL (ref 0–0.61)
EOSINOPHIL NFR BLD AUTO: 6 % (ref 0–6)
ERYTHROCYTE [DISTWIDTH] IN BLOOD BY AUTOMATED COUNT: 13.5 % (ref 11.6–15.1)
GFR SERPL CREATININE-BSD FRML MDRD: 111 ML/MIN/1.73SQ M
GLUCOSE SERPL-MCNC: 87 MG/DL (ref 65–140)
HCT VFR BLD AUTO: 32.2 % (ref 34.8–46.1)
HGB BLD-MCNC: 10 G/DL (ref 11.5–15.4)
IMM GRANULOCYTES # BLD AUTO: 0.04 THOUSAND/UL (ref 0–0.2)
IMM GRANULOCYTES NFR BLD AUTO: 0 % (ref 0–2)
LYMPHOCYTES # BLD AUTO: 2.02 THOUSANDS/ÂΜL (ref 0.6–4.47)
LYMPHOCYTES NFR BLD AUTO: 23 % (ref 14–44)
MCH RBC QN AUTO: 28.7 PG (ref 26.8–34.3)
MCHC RBC AUTO-ENTMCNC: 31.1 G/DL (ref 31.4–37.4)
MCV RBC AUTO: 92 FL (ref 82–98)
MONOCYTES # BLD AUTO: 0.69 THOUSAND/ÂΜL (ref 0.17–1.22)
MONOCYTES NFR BLD AUTO: 8 % (ref 4–12)
NEUTROPHILS # BLD AUTO: 5.61 THOUSANDS/ÂΜL (ref 1.85–7.62)
NEUTS SEG NFR BLD AUTO: 62 % (ref 43–75)
NRBC BLD AUTO-RTO: 0 /100 WBCS
PLATELET # BLD AUTO: 307 THOUSANDS/UL (ref 149–390)
PMV BLD AUTO: 10.4 FL (ref 8.9–12.7)
POTASSIUM SERPL-SCNC: 3.6 MMOL/L (ref 3.5–5.3)
RBC # BLD AUTO: 3.49 MILLION/UL (ref 3.81–5.12)
SODIUM SERPL-SCNC: 138 MMOL/L (ref 135–147)
WBC # BLD AUTO: 8.93 THOUSAND/UL (ref 4.31–10.16)

## 2023-06-16 PROCEDURE — 99024 POSTOP FOLLOW-UP VISIT: CPT | Performed by: OBSTETRICS & GYNECOLOGY

## 2023-06-16 PROCEDURE — 80048 BASIC METABOLIC PNL TOTAL CA: CPT | Performed by: OBSTETRICS & GYNECOLOGY

## 2023-06-16 PROCEDURE — 85025 COMPLETE CBC W/AUTO DIFF WBC: CPT | Performed by: OBSTETRICS & GYNECOLOGY

## 2023-06-16 RX ORDER — ACETAMINOPHEN 325 MG/1
975 TABLET ORAL EVERY 8 HOURS SCHEDULED
Refills: 0
Start: 2023-06-16

## 2023-06-16 RX ADMIN — SCOPALAMINE 1 PATCH: 1 PATCH, EXTENDED RELEASE TRANSDERMAL at 06:38

## 2023-06-16 RX ADMIN — HEPARIN SODIUM 5000 UNITS: 5000 INJECTION INTRAVENOUS; SUBCUTANEOUS at 06:33

## 2023-06-16 RX ADMIN — IBUPROFEN 600 MG: 600 TABLET ORAL at 05:42

## 2023-06-16 RX ADMIN — OXYCODONE HYDROCHLORIDE 5 MG: 5 TABLET ORAL at 02:09

## 2023-06-16 RX ADMIN — OXYCODONE HYDROCHLORIDE 5 MG: 5 TABLET ORAL at 09:41

## 2023-06-16 RX ADMIN — DOCUSATE SODIUM 100 MG: 100 CAPSULE, LIQUID FILLED ORAL at 09:09

## 2023-06-16 RX ADMIN — ACETAMINOPHEN 325MG 975 MG: 325 TABLET ORAL at 06:33

## 2023-06-16 NOTE — CASE MANAGEMENT
Case Management Discharge Planning Note    Patient name Thee Feldman  Location [address] MRN [id number]  : 1990 Date 2023       Current Admission Date: 2023  Current Admission Diagnosis:Status post total abdominal hysterectomy and bilateral salpingo-oophorectomy (COHOA-BSO)   Patient Active Problem List    Diagnosis Date Noted   • Status post total abdominal hysterectomy and bilateral salpingo-oophorectomy (OCHOA-BSO) 2023   • PONV (postoperative nausea and vomiting)    • Pelvic mass 2023   • Hydronephrosis 2023      LOS (days): 3  Geometric Mean LOS (GMLOS) (days): 4 20  Days to GMLOS:1 3     OBJECTIVE:  Risk of Unplanned Readmission Score: 6 15         Current admission status: Inpatient   [facility] [address]  Phone: [phone] Fax: Shaheed Mello [address]  [address] [facility] [address]  Phone: [phone] Fax: [address]   [facility] [address] [facility] [address]  Phone: [phone] Fax: [phone]    Primary Care Provider: No primary care provider on file      Primary Insurance: [address] [place] [address]   Secondary Insurance:     DISCHARGE DETAILS:    Discharge planning discussed with[de-identified] Patient  Freedom of Choice: Yes  Comments - Freedom of Choice: Pt discharging home today  Were Treatment Team discharge recommendations reviewed with patient/caregiver?: Yes  Did patient/caregiver verbalize understanding of patient care needs?: Yes  Were patient/caregiver advised of the risks associated with not following Treatment Team discharge recommendations?: Yes    Treatment Team Recommendation: Home  Transport at Discharge : Automobile  Accompanied by: Family member    Additional Comments: CM consulted for medication costs  Pt will discharge home w/ a 28day script of Eliquis and Oxycodone and Tylenol  Prescribed meds were sent to [facility] an dprice checked by CM  Pt has no charge for either medication  Pt will follow up with her GYN Team in outpatient  Family present at bedside and will transport Pt home  CM following through discharge

## 2023-06-16 NOTE — NURSING NOTE
AVS reviewed w/ pt  All questions answered  IV line removed  Scripts to [place]  Pt discharged in stable condition

## 2023-06-16 NOTE — PLAN OF CARE
Problem: MOBILITY - ADULT  Goal: Maintain or return to baseline ADL function  Description: INTERVENTIONS:  -  Assess patient's ability to carry out ADLs; assess patient's baseline for ADL function and identify physical deficits which impact ability to perform ADLs (bathing, care of mouth/teeth, toileting, grooming, dressing, etc )  - Assess/evaluate cause of self-care deficits   - Assess range of motion  - Assess patient's mobility; develop plan if impaired  - Assess patient's need for assistive devices and provide as appropriate  - Encourage maximum independence but intervene and supervise when necessary  - Involve family in performance of ADLs  - Assess for home care needs following discharge   - Consider OT consult to assist with ADL evaluation and planning for discharge  - Provide patient education as appropriate  Outcome: Progressing  Goal: Maintains/Returns to pre admission functional level  Description: INTERVENTIONS:  - Perform BMAT or MOVE assessment daily    - Set and communicate daily mobility goal to care team and patient/family/caregiver     - Collaborate with rehabilitation services on mobility goals if consulted  - Dangle patient 2 times a day  - Stand patient 3 times a day  - Ambulate patient 3 times a day  - Out of bed to chair 3 times a day   - Out of bed for meals 3 times a day  - Out of bed for toileting  - Record patient progress and toleration of activity level   Outcome: Progressing     Problem: PAIN - ADULT  Goal: Verbalizes/displays adequate comfort level or baseline comfort level  Description: Interventions:  - Encourage patient to monitor pain and request assistance  - Assess pain using appropriate pain scale  - Administer analgesics based on type and severity of pain and evaluate response  - Implement non-pharmacological measures as appropriate and evaluate response  - Consider cultural and social influences on pain and pain management  - Notify physician/advanced practitioner if interventions unsuccessful or patient reports new pain  Outcome: Progressing     Problem: INFECTION - ADULT  Goal: Absence or prevention of progression during hospitalization  Description: INTERVENTIONS:  - Assess and monitor for signs and symptoms of infection  - Monitor lab/diagnostic results  - Monitor all insertion sites, i e  indwelling lines, tubes, and drains  - Monitor endotracheal if appropriate and nasal secretions for changes in amount and color  - [place] appropriate cooling/warming therapies per order  - Administer medications as ordered  - Instruct and encourage patient and family to use good hand hygiene technique  - Identify and instruct in appropriate isolation precautions for identified infection/condition  Outcome: Progressing     Problem: SAFETY ADULT  Goal: Patient will remain free of falls  Description: INTERVENTIONS:  - Educate patient/family on patient safety including physical limitations  - Instruct patient to call for assistance with activity   - Consult OT/PT to assist with strengthening/mobility   - Keep Call bell within reach  - Keep bed low and locked with side rails adjusted as appropriate  - Keep care items and personal belongings within reach  - Initiate and maintain comfort rounds  - Make Fall Risk Sign visible to staff  - Offer Toileting every 2 Hours, in advance of need  - Initiate/Maintain bed alarm  - Apply yellow socks and bracelet for high fall risk patients  - Consider moving patient to room near nurses station  Outcome: Progressing     Problem: DISCHARGE PLANNING  Goal: Discharge to home or other facility with appropriate resources  Description: INTERVENTIONS:  - Identify barriers to discharge w/patient and caregiver  - Arrange for needed discharge resources and transportation as appropriate  - Identify discharge learning needs (meds, wound care, etc )  - Arrange for interpretive services to assist at discharge as needed  - Refer to Case Management Department for coordinating discharge planning if the patient needs post-hospital services based on physician/advanced practitioner order or complex needs related to functional status, cognitive ability, or social support system  Outcome: Progressing     Problem: Knowledge Deficit  Goal: Patient/family/caregiver demonstrates understanding of disease process, treatment plan, medications, and discharge instructions  Description: Complete learning assessment and assess knowledge base    Interventions:  - Provide teaching at level of understanding  - Provide teaching via preferred learning methods  Outcome: Progressing     Problem: GASTROINTESTINAL - ADULT  Goal: Minimal or absence of nausea and/or vomiting  Description: INTERVENTIONS:  - Administer IV fluids if ordered to ensure adequate hydration  - Maintain NPO status until nausea and vomiting are resolved  - Nasogastric tube if ordered  - Administer ordered antiemetic medications as needed  - Provide nonpharmacologic comfort measures as appropriate  - Advance diet as tolerated, if ordered  - Consider nutrition services referral to assist patient with adequate nutrition and appropriate food choices  Outcome: Progressing  Goal: Maintains or returns to baseline bowel function  Description: INTERVENTIONS:  - Assess bowel function  - Encourage oral fluids to ensure adequate hydration  - Administer IV fluids if ordered to ensure adequate hydration  - Administer ordered medications as needed  - Encourage mobilization and activity  - Consider nutritional services referral to assist patient with adequate nutrition and appropriate food choices  Outcome: Progressing     Problem: GENITOURINARY - ADULT  Goal: Maintains or returns to baseline urinary function  Description: INTERVENTIONS:  - Assess urinary function  - Encourage oral fluids to ensure adequate hydration if ordered  - Administer IV fluids as ordered to ensure adequate hydration  - Administer ordered medications as needed  - Offer frequent toileting  - Follow urinary retention protocol if ordered  Outcome: Progressing  Goal: Absence of urinary retention  Description: INTERVENTIONS:  - Assess patient’s ability to void and empty bladder  - Monitor I/O  - Bladder scan as needed  - Discuss with physician/AP medications to alleviate retention as needed  - Discuss catheterization for long term situations as appropriate  Outcome: Progressing  Goal: Urinary catheter remains patent  Description: INTERVENTIONS:  - Assess patency of urinary catheter  - If patient has a chronic lomas, consider changing catheter if non-functioning  - Follow guidelines for intermittent irrigation of non-functioning urinary catheter  Outcome: Progressing     Problem: SKIN/TISSUE INTEGRITY - ADULT  Goal: Skin Integrity remains intact(Skin Breakdown Prevention)  Description: Assess:  -Perform [place] assessment every shift  -Clean and moisturize skin every shift  -Inspect skin when repositioning, toileting, and assisting with ADLS  -Assess under medical devices such as scd sleeves every shift  -Assess extremities for adequate circulation and sensation     Bed Management:  -Have minimal linens on bed & keep smooth, unwrinkled  -Change linens as needed when moist or perspiring  -Avoid sitting or lying in one position for more than 2 hours while in bed  -Keep HOB at 30 degrees     Toileting:  -Offer bedside commode    Activity:  -Mobilize patient 4 times a day  -Encourage activity and walks on unit  -Encourage or provide ROM exercises   -Turn and reposition patient every 2 Hours  -Use appropriate equipment to lift or move patient in bed  -Instruct/ Assist with weight shifting every 2 hours when out of bed in chair  -Consider limitation of chair time 2 hour intervals    Skin Care:  -Avoid use of baby powder, tape, friction and shearing, hot water or constrictive clothing    Next Steps:  -Teach patient strategies to minimize risks such as incentive spirometer  -Consider consults to interdisciplinary teams such as N/A  Outcome: Progressing  Goal: Incision(s), wounds(s) or drain site(s) healing without S/S of infection  Description: INTERVENTIONS  - Assess and document dressing, incision, wound bed, drain sites and surrounding tissue  - Provide patient and family education  Outcome: Progressing

## 2023-06-16 NOTE — PLAN OF CARE
Problem: MOBILITY - ADULT  Goal: Maintain or return to baseline ADL function  Description: INTERVENTIONS:  -  Assess patient's ability to carry out ADLs; assess patient's baseline for ADL function and identify physical deficits which impact ability to perform ADLs (bathing, care of mouth/teeth, toileting, grooming, dressing, etc )  - Assess/evaluate cause of self-care deficits   - Assess range of motion  - Assess patient's mobility; develop plan if impaired  - Assess patient's need for assistive devices and provide as appropriate  - Encourage maximum independence but intervene and supervise when necessary  - Involve family in performance of ADLs  - Assess for home care needs following discharge   - Consider OT consult to assist with ADL evaluation and planning for discharge  - Provide patient education as appropriate  Outcome: Progressing  Goal: Maintains/Returns to pre admission functional level  Description: INTERVENTIONS:  - Perform BMAT or MOVE assessment daily    - Set and communicate daily mobility goal to care team and patient/family/caregiver     - Collaborate with rehabilitation services on mobility goals if consulted  - Dangle patient 2 times a day  - Stand patient 3 times a day  - Ambulate patient 3 times a day  - Out of bed to chair 3 times a day   - Out of bed for meals 3 times a day  - Out of bed for toileting  - Record patient progress and toleration of activity level   Outcome: Progressing     Problem: PAIN - ADULT  Goal: Verbalizes/displays adequate comfort level or baseline comfort level  Description: Interventions:  - Encourage patient to monitor pain and request assistance  - Assess pain using appropriate pain scale  - Administer analgesics based on type and severity of pain and evaluate response  - Implement non-pharmacological measures as appropriate and evaluate response  - Consider cultural and social influences on pain and pain management  - Notify physician/advanced practitioner if interventions unsuccessful or patient reports new pain  Outcome: Progressing     Problem: INFECTION - ADULT  Goal: Absence or prevention of progression during hospitalization  Description: INTERVENTIONS:  - Assess and monitor for signs and symptoms of infection  - Monitor lab/diagnostic results  - Monitor all insertion sites, i e  indwelling lines, tubes, and drains  - Monitor endotracheal if appropriate and nasal secretions for changes in amount and color  - [place] appropriate cooling/warming therapies per order  - Administer medications as ordered  - Instruct and encourage patient and family to use good hand hygiene technique  - Identify and instruct in appropriate isolation precautions for identified infection/condition  Outcome: Progressing     Problem: SAFETY ADULT  Goal: Patient will remain free of falls  Description: INTERVENTIONS:  - Educate patient/family on patient safety including physical limitations  - Instruct patient to call for assistance with activity   - Consult OT/PT to assist with strengthening/mobility   - Keep Call bell within reach  - Keep bed low and locked with side rails adjusted as appropriate  - Keep care items and personal belongings within reach  - Initiate and maintain comfort rounds  - Make Fall Risk Sign visible to staff  - Offer Toileting every 2 Hours, in advance of need  - Initiate/Maintain bed alarm  - Apply yellow socks and bracelet for high fall risk patients  - Consider moving patient to room near nurses station  Outcome: Progressing     Problem: DISCHARGE PLANNING  Goal: Discharge to home or other facility with appropriate resources  Description: INTERVENTIONS:  - Identify barriers to discharge w/patient and caregiver  - Arrange for needed discharge resources and transportation as appropriate  - Identify discharge learning needs (meds, wound care, etc )  - Arrange for interpretive services to assist at discharge as needed  - Refer to Case Management Department for coordinating discharge planning if the patient needs post-hospital services based on physician/advanced practitioner order or complex needs related to functional status, cognitive ability, or social support system  Outcome: Progressing     Problem: Knowledge Deficit  Goal: Patient/family/caregiver demonstrates understanding of disease process, treatment plan, medications, and discharge instructions  Description: Complete learning assessment and assess knowledge base    Interventions:  - Provide teaching at level of understanding  - Provide teaching via preferred learning methods  Outcome: Progressing     Problem: GASTROINTESTINAL - ADULT  Goal: Minimal or absence of nausea and/or vomiting  Description: INTERVENTIONS:  - Administer IV fluids if ordered to ensure adequate hydration  - Maintain NPO status until nausea and vomiting are resolved  - Nasogastric tube if ordered  - Administer ordered antiemetic medications as needed  - Provide nonpharmacologic comfort measures as appropriate  - Advance diet as tolerated, if ordered  - Consider nutrition services referral to assist patient with adequate nutrition and appropriate food choices  Outcome: Progressing  Goal: Maintains or returns to baseline bowel function  Description: INTERVENTIONS:  - Assess bowel function  - Encourage oral fluids to ensure adequate hydration  - Administer IV fluids if ordered to ensure adequate hydration  - Administer ordered medications as needed  - Encourage mobilization and activity  - Consider nutritional services referral to assist patient with adequate nutrition and appropriate food choices  Outcome: Progressing     Problem: GENITOURINARY - ADULT  Goal: Maintains or returns to baseline urinary function  Description: INTERVENTIONS:  - Assess urinary function  - Encourage oral fluids to ensure adequate hydration if ordered  - Administer IV fluids as ordered to ensure adequate hydration  - Administer ordered medications as needed  - Offer frequent toileting  - Follow urinary retention protocol if ordered  Outcome: Progressing  Goal: Absence of urinary retention  Description: INTERVENTIONS:  - Assess patient’s ability to void and empty bladder  - Monitor I/O  - Bladder scan as needed  - Discuss with physician/AP medications to alleviate retention as needed  - Discuss catheterization for long term situations as appropriate  Outcome: Progressing  Goal: Urinary catheter remains patent  Description: INTERVENTIONS:  - Assess patency of urinary catheter  - If patient has a chronic lomas, consider changing catheter if non-functioning  - Follow guidelines for intermittent irrigation of non-functioning urinary catheter  Outcome: Progressing     Problem: SKIN/TISSUE INTEGRITY - ADULT  Goal: Skin Integrity remains intact(Skin Breakdown Prevention)  Description: Assess:  -Perform Sameer assessment every shift  -Clean and moisturize skin every shift  -Inspect skin when repositioning, toileting, and assisting with ADLS  -Assess under medical devices such as scd sleeves every shift  -Assess extremities for adequate circulation and sensation     Bed Management:  -Have minimal linens on bed & keep smooth, unwrinkled  -Change linens as needed when moist or perspiring  -Avoid sitting or lying in one position for more than 2 hours while in bed  -Keep HOB at 30 degrees     Toileting:  -Offer bedside commode    Activity:  -Mobilize patient 4 times a day  -Encourage activity and walks on unit  -Encourage or provide ROM exercises   -Turn and reposition patient every 2 Hours  -Use appropriate equipment to lift or move patient in bed  -Instruct/ Assist with weight shifting every 2 hours when out of bed in chair  -Consider limitation of chair time 2 hour intervals    Skin Care:  -Avoid use of baby powder, tape, friction and shearing, hot water or constrictive clothing    Next Steps:  -Teach patient strategies to minimize risks such as incentive spirometer  -Consider consults to interdisciplinary teams such as N/A  Outcome: Progressing  Goal: Incision(s), wounds(s) or drain site(s) healing without S/S of infection  Description: INTERVENTIONS  - Assess and document dressing, incision, wound bed, drain sites and surrounding tissue  - Provide patient and family education  - Perform skin care/dressing changes every shift  Outcome: Progressing

## 2023-06-16 NOTE — PROGRESS NOTES
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\"  Follow up final pathology    * Status post total abdominal hysterectomy and bilateral salpingo-oophorectomy (OCHOA-BSO)  Assessment & Plan  Hgb 12 8 (5/22) -> 10 9 -> 10 7 --> 10  Pain: s/p epidural, sched tylenol with elvira 2 5/5 PRN and dilaudid for BT  LDA: passed void trial  Diet: regular  DVT ppx: SCDs, heparin            Subjective:  Jennifer P Sarah Kelly has no current complaints  Pain is well controlled though she has more discomfort after ambulating  Patient is currently passing flatus and has had no bowel movement  She is tolerating PO, and denies nausea or vomitting  Patient denies fever, chills, chest pain, shortness of breath, or calf tenderness  Objective:  /77   Pulse 60   Temp 99 1 °F (37 3 °C)   Resp 18   Ht 5' 7\" (1 702 m)   Wt 83 5 kg (184 lb 1 4 oz)   LMP 05/15/2023 (Exact Date)   SpO2 95%   BMI 28 83 kg/m²     I/O last 3 completed shifts: In: 2009 8 [P O :660; I V :1349 8]  Out: 7050 [Urine:7050]  No intake/output data recorded      Lab Results   Component Value Date    WBC 8 93 06/16/2023    HGB 10 0 (L) 06/16/2023    HCT 32 2 (L) 06/16/2023    MCV 92 06/16/2023     06/16/2023       Lab Results   Component Value Date    CALCIUM 7 9 (L) 06/16/2023    K 3 6 06/16/2023    CO2 25 06/16/2023     06/16/2023    BUN 5 " 06/16/2023    CREATININE 0 71 06/16/2023           Physical Exam  Constitutional:       General: She is not in acute distress  Appearance: She is normal weight  HENT:      Mouth/Throat:      Mouth: Mucous membranes are moist       Pharynx: Oropharynx is clear  Cardiovascular:      Rate and Rhythm: Normal rate and regular rhythm  Pulmonary:      Effort: Pulmonary effort is normal  No respiratory distress  Abdominal:      General: There is no distension  Palpations: Abdomen is soft  Comments: Midline vertical incision c/d/i, covered with glue   Skin:     General: Skin is warm and dry  Neurological:      General: No focal deficit present  Mental Status: She is alert and oriented to person, place, and time  Mental status is at baseline             Maikol Caldwell MD  6/16/2023  6:59 AM

## 2023-06-16 NOTE — RESTORATIVE TECHNICIAN NOTE
Restorative Technician Note      Patient Name: Jeff Christianson     Restorative Tech Visit Date: 06/16/23  Note Type: Mobility  Patient Position Upon Consult: Supine  Activity Performed: Ambulated  Patient Position at End of Consult:  Other (comment) (in restroom, nurse present)

## 2023-06-19 NOTE — UTILIZATION REVIEW
NOTIFICATION OF ADMISSION DISCHARGE   This is a Notification of Discharge from [address]  Please be advised that this patient has been discharge from our facility  Below you will find the admission and discharge date and time including the patient’s disposition  UTILIZATION REVIEW CONTACT:  Cecilio Gonzalez MA  Utilization   Network Utilization Review Department  Phone: [phone] x carefully listen to the prompts  All voicemails are confidential   Email: [email or web address]     ADMISSION INFORMATION  PRESENTATION DATE: 6/13/2023  5:01 AM  OBERVATION ADMISSION DATE:   INPATIENT ADMISSION DATE: 6/13/23 11:58 AM   DISCHARGE DATE: 6/16/2023 11:40 AM   DISPOSITION:Home/Self Care    IMPORTANT INFORMATION:  Send all requests for admission clinical reviews, approved or denied determinations and any other requests to dedicated fax number below belonging to the campus where the patient is receiving treatment   List of dedicated fax numbers:  [address] DENIALS (Administrative/Medical Necessity) [phone]   [address] (Maternity/NICU/Pediatrics) [phone]   [facility] [phone]   [place] [address] [phone]   [address] [phone]   220 [facility] [phone]   90 [facility] [phone]   [address] [place], [place] [address] [address] [phone]   [facility]  [phone]   [address] [facility] [phone]   [address] [facility] [address] [facility] [phone]

## 2023-07-03 ENCOUNTER — DOCUMENTATION (OUTPATIENT)
Dept: HEMATOLOGY ONCOLOGY | Facility: CLINIC | Age: 33
End: 2023-07-03

## 2023-07-03 NOTE — PROGRESS NOTES
In-basket message received from Dr. Agapito Nelson to add patient to [facility] on 7/10/2023. Chart reviewed and prep completed.

## 2023-07-06 ENCOUNTER — OFFICE VISIT (OUTPATIENT)
Dept: GYNECOLOGIC ONCOLOGY | Facility: CLINIC | Age: 33
End: 2023-07-06

## 2023-07-06 VITALS
SYSTOLIC BLOOD PRESSURE: 132 MMHG | WEIGHT: 182 LBS | HEIGHT: 67 IN | DIASTOLIC BLOOD PRESSURE: 82 MMHG | TEMPERATURE: 97.6 F | BODY MASS INDEX: 28.56 KG/M2 | HEART RATE: 76 BPM | OXYGEN SATURATION: 96 %

## 2023-07-06 DIAGNOSIS — Z90.710 STATUS POST TOTAL ABDOMINAL HYSTERECTOMY AND BILATERAL SALPINGO-OOPHORECTOMY (TAH-BSO): Primary | ICD-10-CM

## 2023-07-06 DIAGNOSIS — Z90.722 STATUS POST TOTAL ABDOMINAL HYSTERECTOMY AND BILATERAL SALPINGO-OOPHORECTOMY (TAH-BSO): Primary | ICD-10-CM

## 2023-07-06 DIAGNOSIS — Z90.79 STATUS POST TOTAL ABDOMINAL HYSTERECTOMY AND BILATERAL SALPINGO-OOPHORECTOMY (TAH-BSO): Primary | ICD-10-CM

## 2023-07-06 PROCEDURE — 99024 POSTOP FOLLOW-UP VISIT: CPT | Performed by: OBSTETRICS & GYNECOLOGY

## 2023-07-06 NOTE — LETTER
July 6, 2023     Esteban Weiner DO  [address] [place] [address]    Patient: Collin Eaton   YOB: 1990   Date of Visit: 7/6/2023       Dear Dr. Marina Abreu:    Thank you for referring Eribertosachin Velasquez to me for evaluation. Below are my notes for this consultation. If you have questions, please do not hesitate to call me. I look forward to following your patient along with you. Sincerely,        Gutierrez Snell MD        CC: No Recipients    Gutierrez Snell MD  7/6/2023 10:40 AM  Incomplete  Assessment/Plan:    Problem List Items Addressed This Visit    None        CHIEF COMPLAINT: Postoperative evaluation      Patient ID: Collin Eaton is a 27 y.o. female  Patient is a very pleasant 26-year-old female status post OCHOA/BSO staging procedure for likely malignant ovary. 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 The patient tolerated her surgery well and presents today in follow-up. Today, the patient is doing well. She denies significant abdominal pain, pelvic pain, nausea, vomiting, constipation, diarrhea, fevers, chills, or vaginal bleeding. The following portions of the patient's history were reviewed and updated as appropriate: allergies, current medications, past medical history, past social history, past surgical history and problem list.    Review of Systems   Constitutional: Negative. HENT: Negative. Eyes: Negative. Respiratory: Negative. Cardiovascular: Negative. Gastrointestinal: Negative. Endocrine: Negative. Genitourinary: Negative. Musculoskeletal: Negative. Skin: Negative. Neurological: Negative. Hematological: Negative. Psychiatric/Behavioral: Negative.         Current Outpatient Medications   Medication Sig Dispense Refill   • cholecalciferol (VITAMIN D3) 1,000 units tablet Take 5,000 Units by mouth daily     • Multiple Vitamin (multivitamin) tablet Take 1 tablet by mouth daily     • acetaminophen (TYLENOL) 325 mg tablet Take 3 tablets (975 mg total) by mouth every 8 (eight) hours  0   • apixaban (Eliquis) 2.5 mg Take 1 tablet (2.5 mg total) by mouth 2 (two) times a day for 28 days (Patient not taking: Reported on 7/6/2023) 56 tablet 0   • oxyCODONE (ROXICODONE) 5 immediate release tablet 1 -2 tabs by mouth every 4-6 hour as needed 20 tablet 0     No current facility-administered medications for this visit. Objective:    Blood pressure 132/82, pulse 76, temperature 97.6 °F (36.4 °C), temperature source Temporal, height 5' 7" (1.702 m), weight 82.6 kg (182 lb), last menstrual period 05/15/2023, SpO2 96 %. Body mass index is 28.51 kg/m². Body surface area is 1.94 meters squared. Physical Exam  Constitutional:       Appearance: She is well-developed. HENT:      Head: Normocephalic and atraumatic. Neck:      Thyroid: No thyromegaly. Cardiovascular:      Rate and Rhythm: Normal rate and regular rhythm. Heart sounds: Normal heart sounds. Pulmonary:      Effort: Pulmonary effort is normal.      Breath sounds: Normal breath sounds. Abdominal:      General: Bowel sounds are normal.      Palpations: Abdomen is soft. Comments: Well healed midline incisions. Genitourinary:     Comments: -Normal external female genitalia, normal [place]'s and [place]'s glands                  -Normal midline urethral meatus. No lesions notes                  -Bladder without fullness mass or tenderness                  -Vagina without lesion or discharge No significant cystocele or rectocele noted                  -Cervix surgically absent                  -Uterus surgically absent                  -Adnexae surgically absent                  - Anus without fissure of lesion    Musculoskeletal:         General: Normal range of motion. Cervical back: Normal range of motion and neck supple.    Lymphadenopathy:      Cervical: No cervical adenopathy. Skin:     General: Skin is warm and dry. Neurological:      Mental Status: She is alert and oriented to person, place, and time.    Psychiatric:         Behavior: Behavior normal.         Lab Results   Component Value Date     214.4 (H) 06/02/2023     Lab Results   Component Value Date    K 3.6 06/16/2023     06/16/2023    CO2 25 06/16/2023    BUN 5 06/16/2023    CREATININE 0.71 06/16/2023    CALCIUM 7.9 (L) 06/16/2023    CORRECTEDCA 10.0 06/02/2023    AST 19 06/02/2023    ALT 19 06/02/2023    ALKPHOS 73 06/02/2023    EGFR 111 06/16/2023     Lab Results   Component Value Date    WBC 8.93 06/16/2023    HGB 10.0 (L) 06/16/2023    HCT 32.2 (L) 06/16/2023    MCV 92 06/16/2023     06/16/2023     Lab Results   Component Value Date    NEUTROABS 5.61 06/16/2023        Trend:  Lab Results   Component Value Date     214.4 (H) 06/02/2023

## 2023-07-06 NOTE — PROGRESS NOTES
Assessment/Plan:    Problem List Items Addressed This Visit        Other    Status post total abdominal hysterectomy and bilateral salpingo-oophorectomy (OCHOA-BSO) - Primary     Patient is a very pleasant 17-year-old female with a history of ovarian mass. She underwent staging procedure for likely endometrioid malignancy within endometriosis however final report is pending. We discussed the patient that she would likely need chemotherapy due to extension to nearby: As evidenced by biopsy. We have also discussed that she would likely need genetics consult however final pathology report remains necessary to put a final plan together. We will see the patient back in 2 weeks to discuss upcoming plans and final pathology report. CHIEF COMPLAINT: Postoperative evaluation      Patient ID: Santiago Madrigal is a 27 y.o. female  Patient is a very pleasant 17-year-old female status post OCHOA/BSO staging procedure for likely malignant ovary. 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 The patient tolerated her surgery well and presents today in follow-up. Today, the patient is doing well. She denies significant abdominal pain, pelvic pain, nausea, vomiting, constipation, diarrhea, fevers, chills, or vaginal bleeding. The following portions of the patient's history were reviewed and updated as appropriate: allergies, current medications, past medical history, past social history, past surgical history and problem list.    Review of Systems   Constitutional: Negative. HENT: Negative. Eyes: Negative. Respiratory: Negative. Cardiovascular: Negative. Gastrointestinal: Negative. Endocrine: Negative. Genitourinary: Negative. Musculoskeletal: Negative. Skin: Negative. Neurological: Negative. Hematological: Negative. Psychiatric/Behavioral: Negative.         Current Outpatient Medications Medication Sig Dispense Refill   • cholecalciferol (VITAMIN D3) 1,000 units tablet Take 5,000 Units by mouth daily     • Multiple Vitamin (multivitamin) tablet Take 1 tablet by mouth daily     • acetaminophen (TYLENOL) 325 mg tablet Take 3 tablets (975 mg total) by mouth every 8 (eight) hours  0   • apixaban (Eliquis) 2.5 mg Take 1 tablet (2.5 mg total) by mouth 2 (two) times a day for 28 days (Patient not taking: Reported on 7/6/2023) 56 tablet 0   • oxyCODONE (ROXICODONE) 5 immediate release tablet 1 -2 tabs by mouth every 4-6 hour as needed 20 tablet 0     No current facility-administered medications for this visit. Objective:    Blood pressure 132/82, pulse 76, temperature 97.6 °F (36.4 °C), temperature source Temporal, height 5' 7" (1.702 m), weight 82.6 kg (182 lb), last menstrual period 05/15/2023, SpO2 96 %. Body mass index is 28.51 kg/m². Body surface area is 1.94 meters squared. Physical Exam  Constitutional:       Appearance: She is well-developed. HENT:      Head: Normocephalic and atraumatic. Neck:      Thyroid: No thyromegaly. Cardiovascular:      Rate and Rhythm: Normal rate and regular rhythm. Heart sounds: Normal heart sounds. Pulmonary:      Effort: Pulmonary effort is normal.      Breath sounds: Normal breath sounds. Abdominal:      General: Bowel sounds are normal.      Palpations: Abdomen is soft. Comments: Well healed midline incisions. Genitourinary:     Comments: -Normal external female genitalia, normal [place]th[place]'s and [place]'s glands                  -Normal midline urethral meatus.  No lesions notes                  -Bladder without fullness mass or tenderness                  -Vagina without lesion or discharge No significant cystocele or rectocele noted                  -Cervix surgically absent                  -Uterus surgically absent                  -Adnexae surgically absent                  - Anus without fissure of lesion    Musculoskeletal: General: Normal range of motion. Cervical back: Normal range of motion and neck supple. Lymphadenopathy:      Cervical: No cervical adenopathy. Skin:     General: Skin is warm and dry. Neurological:      Mental Status: She is alert and oriented to person, place, and time.    Psychiatric:         Behavior: Behavior normal.         Lab Results   Component Value Date     214.4 (H) 06/02/2023     Lab Results   Component Value Date    K 3.6 06/16/2023     06/16/2023    CO2 25 06/16/2023    BUN 5 06/16/2023    CREATININE 0.71 06/16/2023    CALCIUM 7.9 (L) 06/16/2023    CORRECTEDCA 10.0 06/02/2023    AST 19 06/02/2023    ALT 19 06/02/2023    ALKPHOS 73 06/02/2023    EGFR 111 06/16/2023     Lab Results   Component Value Date    WBC 8.93 06/16/2023    HGB 10.0 (L) 06/16/2023    HCT 32.2 (L) 06/16/2023    MCV 92 06/16/2023     06/16/2023     Lab Results   Component Value Date    NEUTROABS 5.61 06/16/2023        Trend:  Lab Results   Component Value Date     214.4 (H) 06/02/2023

## 2023-07-11 ENCOUNTER — DOCUMENTATION (OUTPATIENT)
Dept: GYNECOLOGIC ONCOLOGY | Facility: CLINIC | Age: 33
End: 2023-07-11

## 2023-07-11 PROBLEM — C56.2 MALIGNANT NEOPLASM OF LEFT OVARY (HCC): Status: ACTIVE | Noted: 2023-07-11

## 2023-07-11 NOTE — PROGRESS NOTES
Multidisciplinary Gynecologic Oncology Tumor Case Review       Physician Recommended Plan     Michael Garcia is a 27 y.o. 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 Pathology slides sent for second opinion. Final Path is pending. Patient was discussed at the Multidisciplinary Gynecologic Oncology Case review on 7/10/2023. The group recommended to consider treatment with systemic chemotherapy plus Avastin. Follow-up appointment with Dr Erick Hester on 7/17/2023. NCCN guidelines were available for review. The final treatment plan will be left to the discretion of the patient and the treating physician. DISCLAIMERS:    TO THE TREATING PHYSICIAN:  This conference is a meeting of clinicians from various specialty areas who evaluate and discuss patients for whom a multidisciplinary treatment approach is being considered. Please note that the above opinion was a consensus of the conference attendees and is intended only to assist in quality care of the discussed patient. The responsibility for follow up on the input given during the conference, along with any final decisions regarding plan of care, is that of the patient and the patient's provider. Accordingly, appointments have only been recommended based on this information and have NOT been scheduled unless otherwise noted. TO THE PATIENT:  This summary is a brief record of major aspects of your cancer treatment. You may choose to share a copy with any of your doctors or nurses. However, this is not a detailed or comprehensive record of your care.

## 2023-07-13 ENCOUNTER — TELEPHONE (OUTPATIENT)
Dept: HEMATOLOGY ONCOLOGY | Facility: CLINIC | Age: 33
End: 2023-07-13

## 2023-07-13 NOTE — TELEPHONE ENCOUNTER
Patient Call    Who are you speaking with? Patient    If it is not the patient, are they listed on an active communication consent form? N/A   What is the reason for this call? Patient calling in wanting to know her pathology, as well as whether she will need to do chemo. She states that at her last appointment, Dr Emeyl Friedman told her once the pathology comes back that he would call her. Does this require a call back? Yes   If a call back is required, please list best call back number [phone]   If a call back is required, advise that a message will be forwarded to their care team and someone will return their call as soon as possible. Did you relay this information to the patient?  Yes

## 2023-07-13 NOTE — TELEPHONE ENCOUNTER
Return call placed to patient. Discussed pathology briefly. Moved 7/17 appt to 7/18 so that it will be after tumor board conference scheduled for Monday afternoon. Patient is in agreement with plan.

## 2023-07-18 ENCOUNTER — TELEPHONE (OUTPATIENT)
Dept: INTERVENTIONAL RADIOLOGY/VASCULAR | Facility: HOSPITAL | Age: 33
End: 2023-07-18

## 2023-07-18 ENCOUNTER — DOCUMENTATION (OUTPATIENT)
Dept: HEMATOLOGY ONCOLOGY | Facility: CLINIC | Age: 33
End: 2023-07-18

## 2023-07-18 ENCOUNTER — TELEMEDICINE (OUTPATIENT)
Dept: GYNECOLOGIC ONCOLOGY | Facility: CLINIC | Age: 33
End: 2023-07-18
Payer: COMMERCIAL

## 2023-07-18 DIAGNOSIS — C56.9 MALIGNANT NEOPLASM OF OVARY, UNSPECIFIED LATERALITY (HCC): Primary | ICD-10-CM

## 2023-07-18 DIAGNOSIS — C56.9 MALIGNANT NEOPLASM OF OVARY, UNSPECIFIED LATERALITY (HCC): ICD-10-CM

## 2023-07-18 DIAGNOSIS — Z51.11 ADMISSION FOR CHEMOTHERAPY: ICD-10-CM

## 2023-07-18 DIAGNOSIS — C56.2 MALIGNANT NEOPLASM OF LEFT OVARY (HCC): Primary | ICD-10-CM

## 2023-07-18 DIAGNOSIS — R11.0 CHEMOTHERAPY-INDUCED NAUSEA: ICD-10-CM

## 2023-07-18 DIAGNOSIS — T45.1X5A CHEMOTHERAPY-INDUCED NAUSEA: ICD-10-CM

## 2023-07-18 DIAGNOSIS — T45.1X5A CHEMOTHERAPY INDUCED NEUTROPENIA: ICD-10-CM

## 2023-07-18 DIAGNOSIS — D70.1 CHEMOTHERAPY INDUCED NEUTROPENIA: ICD-10-CM

## 2023-07-18 DIAGNOSIS — C56.2 MALIGNANT NEOPLASM OF LEFT OVARY (HCC): ICD-10-CM

## 2023-07-18 PROCEDURE — 99215 OFFICE O/P EST HI 40 MIN: CPT | Performed by: OBSTETRICS & GYNECOLOGY

## 2023-07-18 RX ORDER — LORAZEPAM 1 MG/1
1 TABLET ORAL EVERY 6 HOURS PRN
Qty: 36 TABLET | Refills: 1 | Status: SHIPPED | OUTPATIENT
Start: 2023-07-18

## 2023-07-18 RX ORDER — ONDANSETRON HYDROCHLORIDE 8 MG/1
8 TABLET, FILM COATED ORAL EVERY 8 HOURS PRN
Qty: 30 TABLET | Refills: 1 | Status: SHIPPED | OUTPATIENT
Start: 2023-07-18

## 2023-07-18 NOTE — H&P (VIEW-ONLY)
Virtual Regular Visit    Verification of patient location:    Patient is located at Home in the following state in which I hold an active license [place]      Assessment/Plan:    Problem List Items Addressed This Visit        Endocrine    Malignant neoplasm of left ovary [facility])     Patient is a very pleasant 77-year-old female status post OCOHA/BSO debulking for 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 There was no sign of any other tumor in the abdomen or other biopsies. Chest x-ray was negative. As such we have recommended treatment with a germ cell regimen. Recommended cisplatin 20 mg per metered squared days 1 through 5, etoposide 100 mg per metered squared days 1 through 5 and bleomycin 30 mg IV day 1 8 and 15 of a 21-day cycle. We have recommended four cycles. I have discussed with patient risks and benefits of treatment. We have quoted that this is a curative intent with at least 80% cure rate. We discussed risks including pulmonary dysfunction, neuropathy and nephropathy hepatopathy low blood counts nausea vomiting and hair loss. We have also discussed the low but measurable possibility of induction of a new cancer. We recommended the patient get a port. The patient understands accepts the risks. She will sign an informed consent when coming in for chemo. She like to get treated at the earliest possible convenience. The patient has no significant history of pulmonary disease we have recommended PFTs with DLCO. This has been ordered. We have recommended a port to the vesicant nature of etoposide. This has been ordered. We will call from the office to get treatment schedule to soon as possible. I recommended the first course of treatment as inpatient. Additionally the patient may return to [facility] without contact.   We have recommended that she stay off work till at least 12 weeks post op Other Visit Diagnoses     Malignant neoplasm of ovary, unspecified laterality ([address])    -  Primary    Relevant Orders    Complete PFT with post bronchodilator    Ambulatory Referral to Interventional Radiology               Reason for visit is treatment planning stage II ovarian cancer with yolk sac tumor and endometrioid subtypes  Chief Complaint   Patient presents with   • Virtual Regular Visit        Encounter provider José Antonio Grace MD    Provider located at [address]  [phone]      Recent Visits  No visits were found meeting these conditions. Showing recent visits within past 7 days and meeting all other requirements  Today's Visits  Date Type Provider Dept   07/18/23 [facility] today's visits and meeting all other requirements  Future Appointments  No visits were found meeting these conditions. Showing future appointments within next 150 days and meeting all other requirements       The patient was identified by name and date of birth. Perlita Arreola was informed that this is a telemedicine visit and that the visit is being conducted through the ARI. She agrees to proceed. .  My office door was closed. No one else was in the room. She acknowledged consent and understanding of privacy and security of the video platform. The patient has agreed to participate and understands they can discontinue the visit at any time. Patient is aware this is a billable service. Oncology History   Malignant neoplasm of left ovary ([facility])   6/13/2023 Surgery    Patient underwent exploratory laparotomy OCHOA/BSO and staging procedure. Intra-Op findings revealed a large left adnexal mass approximately 12 to 14 cm densely adherent to the sigmoid colon.   It ruptured releasing chocolate fluid into the abdomen however prior staining of the omentum indicated likely previous rupture. Final pathology report after extensive review by [facility] was most consistent with endometrioid adenocarcinoma initiating within endometriosis within the ovary. Additionally yolk sac tumor was identified which extended to the local:. Making this a stage IIB tumor. Would recommend 4 cycles of bleomycin etoposide and cis-platinum. 7/11/2023 Initial Diagnosis    Malignant neoplasm of left ovary ([address])     7/11/2023 -  Cancer Staged    Staging form: Ovary, Fallopian Tube, Primary Peritoneal, AJCC 8th Edition  - Clinical stage from 7/11/2023: Stage IIB (cT2b, cN0, cM0) - Signed by Jesse Joseph MD on 7/11/2023  Histopathologic type: Yolk sac tumor  Stage prefix: Initial diagnosis         Crista Goyal is a 27 y.o. female for chemo treatment planning for newly diagnosed ovarian cancer. Patient is a very pleasant 57-year-old female with a ovarian malignancy. She recently underwent OCHOA/BSO and staging/debulking for stage IIb carcinoma of the ovary of endometrioid subtype and yolk sac tumor component. The yolk sac tumor component was also on the colon. The tumor was removed by I making a complete debulking. Final pathology report has only become recently available as this was sent out. The patient has been doing well postoperatively. Her most recent exam was unremarkable. Presently she is doing well. Today, the patient is doing well. She denies significant abdominal pain, pelvic pain, nausea, vomiting, constipation, diarrhea, fevers, chills, or vaginal bleeding.        Past Medical History:   Diagnosis Date   • No known health problems    • PONV (postoperative nausea and vomiting)        Past Surgical History:   Procedure Laterality Date   • HYSTERECTOMY N/A 6/13/2023    Procedure: OCHOA/BSO, STAGING radical omentectomy bilateral pelvic lymph node dissection;  Surgeon: Jesse Joseph MD; [facility];  Service: Gynecology Oncology   • ORIF TIBIA & FIBULA FRACTURES Right    • VT EXPLORATORY LAPAROTOMY CELIOTOMY W/WO BIOPSY SPX N/A 6/13/2023    Procedure: EX LAP;  Surgeon: Aquilino Cordova MD;  [facility];  Service: Gynecology Oncology   • URETERAL STENT PLACEMENT Bilateral 6/13/2023    Procedure: Karolinetheodora Aburto; STENT URETERAL;  Surgeon: Blaine Maldonado MD;  [facility];  Service: Urology   • WISDOM TOOTH EXTRACTION         Current Outpatient Medications   Medication Sig Dispense Refill   • acetaminophen (TYLENOL) 325 mg tablet Take 3 tablets (975 mg total) by mouth every 8 (eight) hours  0   • apixaban (Eliquis) 2.5 mg Take 1 tablet (2.5 mg total) by mouth 2 (two) times a day for 28 days (Patient not taking: Reported on 7/6/2023) 56 tablet 0   • cholecalciferol (VITAMIN D3) 1,000 units tablet Take 5,000 Units by mouth daily     • Multiple Vitamin (multivitamin) tablet Take 1 tablet by mouth daily     • oxyCODONE (ROXICODONE) 5 immediate release tablet 1 -2 tabs by mouth every 4-6 hour as needed 20 tablet 0     No current facility-administered medications for this visit. Allergies   Allergen Reactions   • Penicillins Itching and Rash     Rash  Rash         Review of Systems   Constitutional: Negative. HENT: Negative. Eyes: Negative. Respiratory: Negative. Cardiovascular: Negative. Gastrointestinal: Negative. Endocrine: Negative. Genitourinary: Negative. Musculoskeletal: Negative. Skin: Negative. Neurological: Negative. Hematological: Negative. Psychiatric/Behavioral: Negative. Video Exam    There were no vitals filed for this visit. It was my intent to perform this visit via video technology but the patient was not able to do a video connection so the visit was completed via audio telephone only.   Physical Exam     Visit Time  Total Visit Duration: 40 min

## 2023-07-18 NOTE — PROGRESS NOTES
In-basket message received from Dr. Agapito Nelson to add patient to [facility] on 7/24/2023. Chart reviewed and prep completed.

## 2023-07-18 NOTE — LETTER
July 18, 2023     Kristian Nuno DO  [address] [place] [address]    Patient: Leonard Hall   YOB: 1990   Date of Visit: 7/18/2023       Dear Dr. Cedeño Led:    Thank you for referring Joanne Charles to me for evaluation. Below are my notes for this consultation. If you have questions, please do not hesitate to call me. I look forward to following your patient along with you. Sincerely,        Aquilino Cordova MD        CC: No Recipients    Aquilino Cordova MD  7/18/2023  9:13 AM  Sign when Signing Visit    Virtual Regular Visit    Verification of patient location:    Patient is located at Home in the following state in which I hold an active license [place]      Assessment/Plan:    Problem List Items Addressed This Visit          Endocrine    Malignant neoplasm of left ovary [facility])     Patient is a very pleasant 64-year-old female status post OCHOA/BSO debulking for 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 There was no sign of any other tumor in the abdomen or other biopsies. Chest x-ray was negative. As such we have recommended treatment with a germ cell regimen. Recommended cisplatin 20 mg per metered squared days 1 through 5, etoposide 100 mg per metered squared days 1 through 5 and bleomycin 30 mg IV day 1 8 and 15 of a 21-day cycle. We have recommended four cycles. I have discussed with patient risks and benefits of treatment. We have quoted that this is a curative intent with at least 80% cure rate. We discussed risks including pulmonary dysfunction, neuropathy and nephropathy hepatopathy low blood counts nausea vomiting and hair loss. We have also discussed the low but measurable possibility of induction of a new cancer. We recommended the patient get a port. The patient understands accepts the risks. She will sign an informed consent when coming in for chemo.   She like to get treated at the earliest possible convenience. The patient has no significant history of pulmonary disease we have recommended PFTs with DLCO. This has been ordered. We have recommended a port to the vesicant nature of etoposide. This has been ordered. We will call from the office to get treatment schedule to soon as possible. I recommended the first course of treatment as inpatient. Additionally the patient may return to [facility] without contact. We have recommended that she stay off work till at least 12 weeks post op              Other Visit Diagnoses       Malignant neoplasm of ovary, unspecified laterality ([address])    -  Primary    Relevant Orders    Complete PFT with post bronchodilator    Ambulatory Referral to Interventional Radiology                 Reason for visit is treatment planning stage II ovarian cancer with yolk sac tumor and endometrioid subtypes  Chief Complaint   Patient presents with   • Virtual Regular Visit        Encounter provider Evelyne Fothergill, MD    Provider located at [address] [place] [address]  [phone]      Recent Visits  No visits were found meeting these conditions. Showing recent visits within past 7 days and meeting all other requirements  Today's Visits  Date Type Provider Dept   07/18/23 [address] [place] [address] today's visits and meeting all other requirements  Future Appointments  No visits were found meeting these conditions. Showing future appointments within next 150 days and meeting all other requirements       The patient was identified by name and date of birth. Jorge Bess was informed that this is a telemedicine visit and that the visit is being conducted through the [employer]. She agrees to proceed. .  My office door was closed. No one else was in the room. She acknowledged consent and understanding of privacy and security of the video platform. The patient has agreed to participate and understands they can discontinue the visit at any time. Patient is aware this is a billable service. Oncology History   Malignant neoplasm of left ovary ([address])   6/13/2023 Surgery    Patient underwent exploratory laparotomy OCHOA/BSO and staging procedure. Intra-Op findings revealed a large left adnexal mass approximately 12 to 14 cm densely adherent to the sigmoid colon. It ruptured releasing chocolate fluid into the abdomen however prior staining of the omentum indicated likely previous rupture. Final pathology report after extensive review by [facility] was most consistent with endometrioid adenocarcinoma initiating within endometriosis within the ovary. Additionally yolk sac tumor was identified which extended to the local:. Making this a stage IIB tumor. Would recommend 4 cycles of bleomycin etoposide and cis-platinum. 7/11/2023 Initial Diagnosis    Malignant neoplasm of left ovary ([address])     7/11/2023 -  Cancer Staged    Staging form: Ovary, Fallopian Tube, Primary Peritoneal, AJCC 8th Edition  - Clinical stage from 7/11/2023: Stage IIB (cT2b, cN0, cM0) - Signed by Brian Mitchell MD on 7/11/2023  Histopathologic type: Yolk sac tumor  Stage prefix: Initial diagnosis         Subjective  Jennifer Dee is a 27 y.o. female for chemo treatment planning for newly diagnosed ovarian cancer. Patient is a very pleasant 68-year-old female with a ovarian malignancy. She recently underwent OCHOA/BSO and staging/debulking for stage IIb carcinoma of the ovary of endometrioid subtype and yolk sac tumor component. The yolk sac tumor component was also on the colon. The tumor was removed by I making a complete debulking. Final pathology report has only become recently available as this was sent out. The patient has been doing well postoperatively.   Her most recent exam was unremarkable. Presently she is doing well. Today, the patient is doing well. She denies significant abdominal pain, pelvic pain, nausea, vomiting, constipation, diarrhea, fevers, chills, or vaginal bleeding. Past Medical History:   Diagnosis Date   • No known health problems    • PONV (postoperative nausea and vomiting)        Past Surgical History:   Procedure Laterality Date   • HYSTERECTOMY N/A 6/13/2023    Procedure: OCHOA/BSO, STAGING radical omentectomy bilateral pelvic lymph node dissection;  Surgeon: Fanta Kidd MD;  [facility];  Service: Gynecology Oncology   • ORIF TIBIA & FIBULA FRACTURES Right    • NM EXPLORATORY LAPAROTOMY CELIOTOMY W/WO BIOPSY SPX N/A 6/13/2023    Procedure: EX LAP;  Surgeon: Fanta Kidd MD;  [facility];  Service: Gynecology Oncology   • URETERAL STENT PLACEMENT Bilateral 6/13/2023    Procedure: CYSTO; STENT URETERAL;  Surgeon: Adrianna Prince MD;  [facility];  Service: Urology   • WISDOM TOOTH EXTRACTION         Current Outpatient Medications   Medication Sig Dispense Refill   • acetaminophen (TYLENOL) 325 mg tablet Take 3 tablets (975 mg total) by mouth every 8 (eight) hours  0   • apixaban (Eliquis) 2.5 mg Take 1 tablet (2.5 mg total) by mouth 2 (two) times a day for 28 days (Patient not taking: Reported on 7/6/2023) 56 tablet 0   • cholecalciferol (VITAMIN D3) 1,000 units tablet Take 5,000 Units by mouth daily     • Multiple Vitamin (multivitamin) tablet Take 1 tablet by mouth daily     • oxyCODONE (ROXICODONE) 5 immediate release tablet 1 -2 tabs by mouth every 4-6 hour as needed 20 tablet 0     No current facility-administered medications for this visit. Allergies   Allergen Reactions   • Penicillins Itching and Rash     Rash  Rash         Review of Systems   Constitutional: Negative. HENT: Negative. Eyes: Negative. Respiratory: Negative. Cardiovascular: Negative. Gastrointestinal: Negative. Endocrine: Negative. Genitourinary: Negative. Musculoskeletal: Negative. Skin: Negative. Neurological: Negative. Hematological: Negative. Psychiatric/Behavioral: Negative. Video Exam    There were no vitals filed for this visit. It was my intent to perform this visit via video technology but the patient was not able to do a video connection so the visit was completed via audio telephone only.   Physical Exam     Visit Time  Total Visit Duration: ***

## 2023-07-18 NOTE — ASSESSMENT & PLAN NOTE
Patient is a very pleasant 55-year-old female status post OCHOA/BSO debulking for 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 There was no sign of any other tumor in the abdomen or other biopsies. Chest x-ray was negative. As such we have recommended treatment with a germ cell regimen. Recommended cisplatin 20 mg per metered squared days 1 through 5, etoposide 100 mg per metered squared days 1 through 5 and bleomycin 30 mg IV day 1 8 and 15 of a 21-day cycle. We have recommended four cycles. I have discussed with patient risks and benefits of treatment. We have quoted that this is a curative intent with at least 80% cure rate. We discussed risks including pulmonary dysfunction, neuropathy and nephropathy hepatopathy low blood counts nausea vomiting and hair loss. We have also discussed the low but measurable possibility of induction of a new cancer. We recommended the patient get a port. The patient understands accepts the risks. She will sign an informed consent when coming in for chemo. She like to get treated at the earliest possible convenience. The patient has no significant history of pulmonary disease we have recommended PFTs with DLCO. This has been ordered. We have recommended a port to the vesicant nature of etoposide. This has been ordered. We will call from the office to get treatment schedule to soon as possible. I recommended the first course of treatment as inpatient. [facility]   We have recommended that she stay off work till at least 12 weeks post op

## 2023-07-18 NOTE — LETTER
July 18, 2023     Praful Myers DO  [address] [place] [address]    Patient: Prachi Acuna   YOB: 1990   Date of Visit: 7/18/2023       Dear Dr. Diana Saeed:    Thank you for referring Rekha Bradford to me for evaluation. Below are my notes for this consultation. If you have questions, please do not hesitate to call me. I look forward to following your patient along with you. 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 There was no sign of any other tumor in the abdomen or other biopsies. Chest x-ray was negative. As such we have recommended treatment with a germ cell regimen. Recommended cisplatin 20 mg per metered squared days 1 through 5, etoposide 100 mg per metered squared days 1 through 5 and bleomycin 30 mg IV day 1 8 and 15 of a 21-day cycle. We have recommended four cycles. I have discussed with patient risks and benefits of treatment. We have quoted that this is a curative intent with at least 80% cure rate. We discussed risks including pulmonary dysfunction, neuropathy and nephropathy hepatopathy low blood counts nausea vomiting and hair loss. We have also discussed the low but measurable possibility of induction of a new cancer. We recommended the patient get a port. The patient understands accepts the risks. She will sign an informed consent when coming in for chemo.   She like to get treated at the earliest possible convenience. The patient has no significant history of pulmonary disease we have recommended PFTs with DLCO. This has been ordered. We have recommended a port to the vesicant nature of etoposide. This has been ordered. We will call from the office to get treatment schedule to soon as possible. I recommended the first course of treatment as inpatient. Additionally the patient may return to [facility] without contact. We have recommended that she stay off work till at least 12 weeks post op              Other Visit Diagnoses       Malignant neoplasm of ovary, unspecified laterality ([address])    -  Primary    Relevant Orders    Complete PFT with post bronchodilator    Ambulatory Referral to Interventional Radiology                 Reason for visit is treatment planning stage II ovarian cancer with yolk sac tumor and endometrioid subtypes  Chief Complaint   Patient presents with   • Virtual Regular Visit        Encounter provider Gregory Tapia MD    Provider located at [address] [place] [address]  [phone]      Recent Visits  No visits were found meeting these conditions. Showing recent visits within past 7 days and meeting all other requirements  Today's Visits  Date Type Provider Dept   07/18/23 621 [facility] today's visits and meeting all other requirements  Future Appointments  No visits were found meeting these conditions. Showing future appointments within next 150 days and meeting all other requirements       The patient was identified by name and date of birth. Harrysania Nicholas was informed that this is a telemedicine visit and that the visit is being conducted through the [employer]. She agrees to proceed. .  My office door was closed. No one else was in the room. She acknowledged consent and understanding of privacy and security of the video platform. The patient has agreed to participate and understands they can discontinue the visit at any time. Patient is aware this is a billable service. Oncology History   Malignant neoplasm of left ovary ([address])   6/13/2023 Surgery    Patient underwent exploratory laparotomy OCHOA/BSO and staging procedure. Intra-Op findings revealed a large left adnexal mass approximately 12 to 14 cm densely adherent to the sigmoid colon. It ruptured releasing chocolate fluid into the abdomen however prior staining of the omentum indicated likely previous rupture. Final pathology report after extensive review by [facility] was most consistent with endometrioid adenocarcinoma initiating within endometriosis within the ovary. Additionally yolk sac tumor was identified which extended to the local:. Making this a stage IIB tumor. Would recommend 4 cycles of bleomycin etoposide and cis-platinum. 7/11/2023 Initial Diagnosis    Malignant neoplasm of left ovary ([address])     7/11/2023 -  Cancer Staged    Staging form: Ovary, Fallopian Tube, Primary Peritoneal, AJCC 8th Edition  - Clinical stage from 7/11/2023: Stage IIB (cT2b, cN0, cM0) - Signed by Franko Hernandez MD on 7/11/2023  Histopathologic type: Yolk sac tumor  Stage prefix: Initial diagnosis         Subjective  Jennifer Echeverria is a 27 y.o. female for chemo treatment planning for newly diagnosed ovarian cancer. Patient is a very pleasant 53-year-old female with a ovarian malignancy. She recently underwent OCHOA/BSO and staging/debulking for stage IIb carcinoma of the ovary of endometrioid subtype and yolk sac tumor component. The yolk sac tumor component was also on the colon. The tumor was removed by I making a complete debulking. Final pathology report has only become recently available as this was sent out. The patient has been doing well postoperatively.   Her most recent exam was unremarkable. Presently she is doing well. Today, the patient is doing well. She denies significant abdominal pain, pelvic pain, nausea, vomiting, constipation, diarrhea, fevers, chills, or vaginal bleeding. Past Medical History:   Diagnosis Date   • No known health problems    • PONV (postoperative nausea and vomiting)        Past Surgical History:   Procedure Laterality Date   • HYSTERECTOMY N/A 6/13/2023    Procedure: OCHOA/BSO, STAGING radical omentectomy bilateral pelvic lymph node dissection;  Surgeon: Chelsea Cintron MD;  [facility];  Service: Gynecology Oncology   • ORIF TIBIA & FIBULA FRACTURES Right    • [place] EXPLORATORY LAPAROTOMY CELIOTOMY W/WO BIOPSY SPX N/A 6/13/2023    Procedure: EX LAP;  Surgeon: Chelsea Cintron MD;  [facility];  Service: Gynecology Oncology   • URETERAL STENT PLACEMENT Bilateral 6/13/2023    Procedure: CYSTO; STENT URETERAL;  Surgeon: Jazmin Mcgregor MD;  [facility];  Service: Urology   • WISDOM TOOTH EXTRACTION         Current Outpatient Medications   Medication Sig Dispense Refill   • acetaminophen (TYLENOL) 325 mg tablet Take 3 tablets (975 mg total) by mouth every 8 (eight) hours  0   • apixaban (Eliquis) 2.5 mg Take 1 tablet (2.5 mg total) by mouth 2 (two) times a day for 28 days (Patient not taking: Reported on 7/6/2023) 56 tablet 0   • cholecalciferol (VITAMIN D3) 1,000 units tablet Take 5,000 Units by mouth daily     • Multiple Vitamin (multivitamin) tablet Take 1 tablet by mouth daily     • oxyCODONE (ROXICODONE) 5 immediate release tablet 1 -2 tabs by mouth every 4-6 hour as needed 20 tablet 0     No current facility-administered medications for this visit. Allergies   Allergen Reactions   • Penicillins Itching and Rash     Rash  Rash         Review of Systems   Constitutional: Negative. HENT: Negative. Eyes: Negative. Respiratory: Negative. Cardiovascular: Negative. Gastrointestinal: Negative. Endocrine: Negative. Genitourinary: Negative. Musculoskeletal: Negative. Skin: Negative. Neurological: Negative. Hematological: Negative. Psychiatric/Behavioral: Negative. Video Exam    There were no vitals filed for this visit. It was my intent to perform this visit via video technology but the patient was not able to do a video connection so the visit was completed via audio telephone only.   Physical Exam     Visit Time  Total Visit Duration: 40 min

## 2023-07-18 NOTE — PROGRESS NOTES
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 There was no sign of any other tumor in the abdomen or other biopsies. Chest x-ray was negative. As such we have recommended treatment with a germ cell regimen. Recommended cisplatin 20 mg per metered squared days 1 through 5, etoposide 100 mg per metered squared days 1 through 5 and bleomycin 30 mg IV day 1 8 and 15 of a 21-day cycle. We have recommended four cycles. I have discussed with patient risks and benefits of treatment. We have quoted that this is a curative intent with at least 80% cure rate. We discussed risks including pulmonary dysfunction, neuropathy and nephropathy hepatopathy low blood counts nausea vomiting and hair loss. We have also discussed the low but measurable possibility of induction of a new cancer. We recommended the patient get a port. The patient understands accepts the risks. She will sign an informed consent when coming in for chemo. She like to get treated at the earliest possible convenience. The patient has no significant history of pulmonary disease we have recommended PFTs with DLCO. This has been ordered. We have recommended a port to the vesicant nature of etoposide. This has been ordered. We will call from the office to get treatment schedule to soon as possible. I recommended the first course of treatment as inpatient. Additionally the patient may return to [facility] without contact.   We have recommended that she stay off work till at least 12 weeks post op Other Visit Diagnoses     Malignant neoplasm of ovary, unspecified laterality ([address])    -  Primary    Relevant Orders    Complete PFT with post bronchodilator    Ambulatory Referral to Interventional Radiology               Reason for visit is treatment planning stage II ovarian cancer with yolk sac tumor and endometrioid subtypes  Chief Complaint   Patient presents with   • Virtual Regular Visit        Encounter provider Chlesea Cintron MD    Provider located at [address]  [phone]      Recent Visits  No visits were found meeting these conditions. Showing recent visits within past 7 days and meeting all other requirements  Today's Visits  Date Type Provider Dept   07/18/23 621 [facility] today's visits and meeting all other requirements  Future Appointments  No visits were found meeting these conditions. Showing future appointments within next 150 days and meeting all other requirements       The patient was identified by name and date of birth. Maya Campos was informed that this is a telemedicine visit and that the visit is being conducted through the [employer]. She agrees to proceed. .  My office door was closed. No one else was in the room. She acknowledged consent and understanding of privacy and security of the video platform. The patient has agreed to participate and understands they can discontinue the visit at any time. Patient is aware this is a billable service. Oncology History   Malignant neoplasm of left ovary ([facility])   6/13/2023 Surgery    Patient underwent exploratory laparotomy OCHOA/BSO and staging procedure. Intra-Op findings revealed a large left adnexal mass approximately 12 to 14 cm densely adherent to the sigmoid colon.   It ruptured releasing chocolate fluid into the abdomen however prior staining of the omentum indicated likely previous rupture. Final pathology report after extensive review by [facility] was most consistent with endometrioid adenocarcinoma initiating within endometriosis within the ovary. Additionally yolk sac tumor was identified which extended to the local:. Making this a stage IIB tumor. Would recommend 4 cycles of bleomycin etoposide and cis-platinum. 7/11/2023 Initial Diagnosis    Malignant neoplasm of left ovary ([address])     7/11/2023 -  Cancer Staged    Staging form: Ovary, Fallopian Tube, Primary Peritoneal, AJCC 8th Edition  - Clinical stage from 7/11/2023: Stage IIB (cT2b, cN0, cM0) - Signed by Fanta Kidd MD on 7/11/2023  Histopathologic type: Yolk sac tumor  Stage prefix: Initial diagnosis         Subjective  Jennifer Rios is a 27 y.o. female for chemo treatment planning for newly diagnosed ovarian cancer. Patient is a very pleasant 17-year-old female with a ovarian malignancy. She recently underwent OCHOA/BSO and staging/debulking for stage IIb carcinoma of the ovary of endometrioid subtype and yolk sac tumor component. The yolk sac tumor component was also on the colon. The tumor was removed by I making a complete debulking. Final pathology report has only become recently available as this was sent out. The patient has been doing well postoperatively. Her most recent exam was unremarkable. Presently she is doing well. Today, the patient is doing well. She denies significant abdominal pain, pelvic pain, nausea, vomiting, constipation, diarrhea, fevers, chills, or vaginal bleeding.        Past Medical History:   Diagnosis Date   • No known health problems    • PONV (postoperative nausea and vomiting)        Past Surgical History:   Procedure Laterality Date   • HYSTERECTOMY N/A 6/13/2023    Procedure: OCHOA/BSO, STAGING radical omentectomy bilateral pelvic lymph node dissection;  Surgeon: Fanta Kidd MD; [facility];  Service: Gynecology Oncology   • ORIF TIBIA & FIBULA FRACTURES Right    • IN EXPLORATORY LAPAROTOMY CELIOTOMY W/WO BIOPSY SPX N/A 6/13/2023    Procedure: EX LAP;  Surgeon: Joe Lee MD;  [facility];  Service: Gynecology Oncology   • URETERAL STENT PLACEMENT Bilateral 6/13/2023    Procedure: Sharath Smith; STENT URETERAL;  Surgeon: Brandon Bejarano MD;  [facility];  Service: Urology   • WISDOM TOOTH EXTRACTION         Current Outpatient Medications   Medication Sig Dispense Refill   • acetaminophen (TYLENOL) 325 mg tablet Take 3 tablets (975 mg total) by mouth every 8 (eight) hours  0   • apixaban (Eliquis) 2.5 mg Take 1 tablet (2.5 mg total) by mouth 2 (two) times a day for 28 days (Patient not taking: Reported on 7/6/2023) 56 tablet 0   • cholecalciferol (VITAMIN D3) 1,000 units tablet Take 5,000 Units by mouth daily     • Multiple Vitamin (multivitamin) tablet Take 1 tablet by mouth daily     • oxyCODONE (ROXICODONE) 5 immediate release tablet 1 -2 tabs by mouth every 4-6 hour as needed 20 tablet 0     No current facility-administered medications for this visit. Allergies   Allergen Reactions   • Penicillins Itching and Rash     Rash  Rash         Review of Systems   Constitutional: Negative. HENT: Negative. Eyes: Negative. Respiratory: Negative. Cardiovascular: Negative. Gastrointestinal: Negative. Endocrine: Negative. Genitourinary: Negative. Musculoskeletal: Negative. Skin: Negative. Neurological: Negative. Hematological: Negative. Psychiatric/Behavioral: Negative. Video Exam    There were no vitals filed for this visit. It was my intent to perform this visit via video technology but the patient was not able to do a video connection so the visit was completed via audio telephone only.   Physical Exam     Visit Time  Total Visit Duration: 40 min

## 2023-07-19 ENCOUNTER — TELEPHONE (OUTPATIENT)
Dept: INTERVENTIONAL RADIOLOGY/VASCULAR | Facility: HOSPITAL | Age: 33
End: 2023-07-19

## 2023-07-19 RX ORDER — SODIUM CHLORIDE 9 MG/ML
30 INJECTION, SOLUTION INTRAVENOUS CONTINUOUS
Status: CANCELLED | OUTPATIENT
Start: 2023-07-19

## 2023-07-19 RX ORDER — CEFAZOLIN SODIUM 1 G/50ML
1000 SOLUTION INTRAVENOUS ONCE
Status: CANCELLED | OUTPATIENT
Start: 2023-07-24

## 2023-07-19 NOTE — DISCHARGE INSTRUCTIONS
Pre-procedure Instructions for Interventional Radiology  [address] [facility] [address] César Edwards  [place], [address] [place] [address] [place] [phone]    You are scheduled for a port placement    On 7/24/2023    Your tentative arrival time is 1000. To prepare for your procedure:  Please arrange for someone to drive you home after the procedure and stay with you until the next morning if you are instructed to do so. This is typically for patients receiving some type of sedative or anesthetic for the procedure. DO NOT EAT OR DRINK ANYTHING after midnight on the evening before your procedure including candy & gum. ONLY SIPS OF WATER with your medications are allowed on the morning of your procedure. TAKE ALL OF YOUR REGULAR MEDICATIONS THE MORNING OF YOUR PROCEDURE with sips of water! We may call you to stop some of your blood sugar, blood pressure and blood thinning medications depending on the procedure. Please take all of these medications unless we instruct you to stop them. If you have an allergy to x-ray dye, please contact Interventional Radiology for an x-ray dye preparation which usually consists of an oral steroid and Benadryl. The day of your procedure:  Bring a list of the medications you take at home. Bring medications you take for breathing problems (such as inhalers), medications for chest pain, or both. Bring a case for your glasses or contacts. Bring your insurance card and a form of photo ID. Please leave all valuables such as credit cards and jewelry at home. Report to the registration desk in the main lobby at the [facility]. Ask to be directed to the APU (Ambulatory Procedure Unit). While your procedure is being performed, your family may wait in the Radiology Waiting Room on the 1st floor in Radiology. if they need to leave, they may provide a number to be called following the procedure. Be prepared to stay overnight just in case.  Sometimes procedures will indicate the need for further observation or treatment. If you are scheduled for a follow-up visit with the Interventional Radiologist after your procedure, you will be called with a date and time.

## 2023-07-21 ENCOUNTER — TELEPHONE (OUTPATIENT)
Dept: GYNECOLOGIC ONCOLOGY | Facility: CLINIC | Age: 33
End: 2023-07-21

## 2023-07-21 NOTE — TELEPHONE ENCOUNTER
Called to inform patient of her appointment for her PFT Test. I provided the date, time and location of this test. The original location patient wanted does not perform this test and had to go to the 2nd closest facility. Patient was in agreement of the appointment.

## 2023-07-24 ENCOUNTER — HOSPITAL ENCOUNTER (OUTPATIENT)
Dept: INTERVENTIONAL RADIOLOGY/VASCULAR | Facility: HOSPITAL | Age: 33
Discharge: HOME | End: 2023-07-24
Admitting: RADIOLOGY
Payer: COMMERCIAL

## 2023-07-24 VITALS
HEIGHT: 67 IN | TEMPERATURE: 98 F | DIASTOLIC BLOOD PRESSURE: 94 MMHG | OXYGEN SATURATION: 98 % | HEART RATE: 78 BPM | RESPIRATION RATE: 20 BRPM | SYSTOLIC BLOOD PRESSURE: 140 MMHG | WEIGHT: 182 LBS | BODY MASS INDEX: 28.56 KG/M2

## 2023-07-24 DIAGNOSIS — C56.9 MALIGNANT NEOPLASM OF OVARY, UNSPECIFIED LATERALITY (HCC): ICD-10-CM

## 2023-07-24 PROCEDURE — 36561 INSERT TUNNELED CV CATH: CPT

## 2023-07-24 PROCEDURE — 76937 US GUIDE VASCULAR ACCESS: CPT

## 2023-07-24 PROCEDURE — 77001 FLUOROGUIDE FOR VEIN DEVICE: CPT

## 2023-07-24 PROCEDURE — C1894 INTRO/SHEATH, NON-LASER: HCPCS

## 2023-07-24 PROCEDURE — C1788 PORT, INDWELLING, IMP: HCPCS

## 2023-07-24 RX ORDER — MIDAZOLAM HYDROCHLORIDE 2 MG/2ML
INJECTION, SOLUTION INTRAMUSCULAR; INTRAVENOUS AS NEEDED
Status: COMPLETED | OUTPATIENT
Start: 2023-07-24 | End: 2023-07-24

## 2023-07-24 RX ORDER — SODIUM CHLORIDE 9 MG/ML
30 INJECTION, SOLUTION INTRAVENOUS CONTINUOUS
Status: DISCONTINUED | OUTPATIENT
Start: 2023-07-24 | End: 2023-07-28 | Stop reason: HOSPADM

## 2023-07-24 RX ORDER — CEFAZOLIN SODIUM 1 G/50ML
1000 SOLUTION INTRAVENOUS ONCE
Status: DISCONTINUED | OUTPATIENT
Start: 2023-07-24 | End: 2023-07-24

## 2023-07-24 RX ORDER — VANCOMYCIN HYDROCHLORIDE 1 G/200ML
12.5 INJECTION, SOLUTION INTRAVENOUS EVERY 12 HOURS
Status: DISCONTINUED | OUTPATIENT
Start: 2023-07-24 | End: 2023-07-27 | Stop reason: CLARIF

## 2023-07-24 RX ORDER — LIDOCAINE HYDROCHLORIDE 10 MG/ML
INJECTION, SOLUTION EPIDURAL; INFILTRATION; INTRACAUDAL; PERINEURAL AS NEEDED
Status: COMPLETED | OUTPATIENT
Start: 2023-07-24 | End: 2023-07-24

## 2023-07-24 RX ORDER — FENTANYL CITRATE 50 UG/ML
INJECTION, SOLUTION INTRAMUSCULAR; INTRAVENOUS AS NEEDED
Status: COMPLETED | OUTPATIENT
Start: 2023-07-24 | End: 2023-07-24

## 2023-07-24 RX ADMIN — MIDAZOLAM HYDROCHLORIDE 1 MG: 1 INJECTION, SOLUTION INTRAMUSCULAR; INTRAVENOUS at 11:10

## 2023-07-24 RX ADMIN — SODIUM CHLORIDE 30 ML/HR: 0.9 INJECTION, SOLUTION INTRAVENOUS at 11:04

## 2023-07-24 RX ADMIN — FENTANYL CITRATE 50 MCG: 50 INJECTION, SOLUTION INTRAMUSCULAR; INTRAVENOUS at 11:17

## 2023-07-24 RX ADMIN — MIDAZOLAM HYDROCHLORIDE 1 MG: 1 INJECTION, SOLUTION INTRAMUSCULAR; INTRAVENOUS at 11:16

## 2023-07-24 RX ADMIN — LIDOCAINE HYDROCHLORIDE 10 ML: 10 INJECTION, SOLUTION EPIDURAL; INFILTRATION; INTRACAUDAL; PERINEURAL at 10:20

## 2023-07-24 RX ADMIN — FENTANYL CITRATE 50 MCG: 50 INJECTION, SOLUTION INTRAMUSCULAR; INTRAVENOUS at 11:10

## 2023-07-24 RX ADMIN — VANCOMYCIN HYDROCHLORIDE 1000 MG: 1 INJECTION, SOLUTION INTRAVENOUS at 10:56

## 2023-07-24 NOTE — INTERVAL H&P NOTE
Update: (This section must be completed if the H&P was completed greater than 24 hrs to procedure or admission)    H&P reviewed. After examining the patient, I find no changed to the H&P since it had been written. Patient re-evaluated.  Accept as history and physical.    Joshua Cam MD/July 24, 2023/10:36 AM

## 2023-07-24 NOTE — BRIEF OP NOTE (RAD/CATH)
INTERVENTIONAL RADIOLOGY PROCEDURE NOTE    Date: 7/24/2023    Procedure: Port placement  Procedure Summary     Date: 07/24/23 Room / Location: [address] [place] [address] Interventional Radiology    Anesthesia Start:  Anesthesia Stop:     Procedure: IR PORT PLACEMENT Diagnosis:       Malignant neoplasm of ovary, unspecified laterality ([address])      ( Malignant neoplasm of ovary, unspecified laterality ([address]))    Scheduled Providers:  Responsible Provider:     Anesthesia Type: Not recorded ASA Status: Not recorded          Preoperative diagnosis:   1. Malignant neoplasm of ovary, unspecified laterality ([address])         Postoperative diagnosis: Same. Surgeon: Brenda Donovan MD     Assistant: None. No qualified resident was available. Blood loss: Minimal    Specimens: None     Findings: Right internal jugular approach single lumen power port placed. OK to use. Complications: None immediate.     Anesthesia: conscious sedation

## 2023-07-24 NOTE — DISCHARGE INSTRUCTIONS
Implanted Venous Access Port     WHAT YOU NEED TO KNOW:   An implanted venous access port is a device used to give treatments and take blood. It may also be called a central venous access device (CVAD). The port is a small container that is placed under your skin, usually in your upper chest. The port is attached to a catheter that enters a large vein. DISCHARGE INSTRUCTIONS:   Resume your normal diet. Small sips of flat soda will help with mild nausea. Prevent an infection:   Wash your hands often. Use soap and water. Clean your hands before and after you care for your port. Remind everyone who cares for your port to wash their hands. Check your skin for infection every day. Look for redness, swelling, or fluid oozing from the port site. Care for your port:   1. You may shower beginning 48 hours after procedure. 2. Change dressing if it becomes wet. 3. Remove dressing after 24 hours. Leave glue in place. 4. It is normal for some bruising to occur. 5. Use Tylenol for pain. 6. Limit use of arm on the side that your port was placed. Lift nothing heavier than 5 pounds for 1 week, and then gradually increase activity as tolerated. 7. DO NOT apply ointment, lotion or cream to port site until incision is healed. Allow glue to fall off. DO NOT attempt to peel glue from skin even it it begins to flake. 8, After the port incision is healed you may swim, bathe. Notify the Interventional Radiologist if you have any of the followin. Fever above 101 F    2. Increased redness or swelling after 1st day. 3. Increased pain after 1st day. 4. Any sign of infection (drainage from port site, skin separation, hot to touch). 5. Persistent nausea or vomiting. Contact Interventional Radiology at [phone] [facility] PATIENTS: Contact Interventional Radiology at [phone]) ([address] [facility] 281: Contact Interventional Radiology at [phone]).

## 2023-07-25 ENCOUNTER — DOCUMENTATION (OUTPATIENT)
Dept: GYNECOLOGIC ONCOLOGY | Facility: CLINIC | Age: 33
End: 2023-07-25

## 2023-07-25 NOTE — PROGRESS NOTES
INPATIENT CARDIOLOGY FOLLOW-UP    Name: Damari Parish    Age: 66 y.o. Date of Admission: 9/4/2022  4:46 PM    Date of Service: 9/8/2022    Chief Complaint: Follow-up for Shortness of breath, coronary artery disease      Interim History:  No new overnight cardiac complaints. Feeling better, able to go bath room without dyspnea, swelling of the legs is improving. He still has chest pain in bilateral chest mainly on coughing but otherwise no chest pain. Echo still pending. Currently with no complaints of palpitations, dizziness, or lightheadedness. SR on telemetry.     Review of Systems:   Cardiac: As per HPI  General: No fever, chills  Pulmonary: As per HPI  HEENT: No visual disturbances, difficult swallowing  GI: No nausea, vomiting  Endocrine: No thyroid disease or DM  Musculoskeletal: LUCERO x 4, no focal motor deficits  Skin: Intact, no rashes  Neuro/Psych: No headache or seizures    Problem List:  Patient Active Problem List   Diagnosis    Hyperkalemia    Shortness of breath    Acute on chronic congestive heart failure (HCC)    Coronary artery disease involving native coronary artery of native heart with angina pectoris (HCC)    Chronic obstructive pulmonary disease with acute exacerbation (HCC)       Allergies:  No Known Allergies    Current Medications:  Current Facility-Administered Medications   Medication Dose Route Frequency Provider Last Rate Last Admin    [COMPLETED] technetium sestamibi (CARDIOLITE) injection 30 millicurie  30 millicurie IntraVENous ONCE PRN Heather Quiroz MD   30 millicurie at 87/07/80 85    furosemide (LASIX) tablet 20 mg  20 mg Oral Daily Rich Restrepo MD   20 mg at 09/07/22 1016    regadenoson (LEXISCAN) injection 0.4 mg  0.4 mg IntraVENous ONCE PRN Rich Restrepo MD        pantoprazole (PROTONIX) tablet 40 mg  40 mg Oral BID Guzman Mao DO   40 mg at 09/07/22 2015    calcium carbonate (TUMS) chewable tablet 500 mg  500 mg Oral TID PRN Jonathan Sandhoff, DO Multidisciplinary Gynecologic Oncology Tumor Case Review       Physician Recommended Plan     Jaems Williamson is a 27 y.o. 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 Patient was discussed at the Multidisciplinary Gynecologic Oncology Case review on 7/24/2023. The group recommended to consider treatment with systemic chemotherapy using cisplatin, etoposide and bleomycin Q 3 weeks x 4 cycles. Follow up AFP results. Referral to Genetics. Follow-up appointment with Luke Perez on 9/6/2023. NCCN guidelines were available for review. The final treatment plan will be left to the discretion of the patient and the treating physician. DISCLAIMERS:    TO THE TREATING PHYSICIAN:  This conference is a meeting of clinicians from various specialty areas who evaluate and discuss patients for whom a multidisciplinary treatment approach is being considered. Please note that the above opinion was a consensus of the conference attendees and is intended only to assist in quality care of the discussed patient. The responsibility for follow up on the input given during the conference, along with any final decisions regarding plan of care, is that of the patient and the patient's provider. Accordingly, appointments have only been recommended based on this information and have NOT been scheduled unless otherwise noted. TO THE PATIENT:  This summary is a brief record of major aspects of your cancer treatment. You may choose to share a copy with any of your doctors or nurses. However, this is not a detailed or comprehensive record of your care. ibuprofen (ADVIL;MOTRIN) tablet 400 mg  400 mg Oral Q6H PRN Kavita Bal Lockso, DO   400 mg at 09/07/22 2015    guaiFENesin-dextromethorphan (ROBITUSSIN DM) 100-10 MG/5ML syrup 5 mL  5 mL Oral Q4H PRN ANDIE Sauer NP   5 mL at 09/07/22 1640    predniSONE (DELTASONE) tablet 40 mg  40 mg Oral Daily ANDIE Gamboa - CNP   40 mg at 09/07/22 1018    Arformoterol Tartrate (BROVANA) nebulizer solution 15 mcg  15 mcg Nebulization BID ANDIE Jeffers CNP   15 mcg at 09/07/22 1921    budesonide (PULMICORT) nebulizer suspension 500 mcg  500 mcg Nebulization BID ANDIE Jeffers CNP   500 mcg at 09/07/22 1921    albuterol (PROVENTIL) nebulizer solution 2.5 mg  2.5 mg Nebulization 4x daily Stuart Rees MD   2.5 mg at 09/07/22 1921    atorvastatin (LIPITOR) tablet 40 mg  40 mg Oral Daily Stuart Rees MD   40 mg at 09/07/22 1016    insulin glargine (LANTUS) injection vial 50 Units  50 Units SubCUTAneous Nightly Stuart Rees MD   50 Units at 09/07/22 2129    insulin lispro (HUMALOG) injection vial 12 Units  12 Units SubCUTAneous TID  Stuart Rees MD   12 Units at 09/07/22 0900    insulin lispro (HUMALOG) injection vial 0-8 Units  0-8 Units SubCUTAneous TID  Stuart Rees MD   2 Units at 09/06/22 0600    insulin lispro (HUMALOG) injection vial 0-4 Units  0-4 Units SubCUTAneous Nightly Stuart Rees MD        glucose chewable tablet 16 g  4 tablet Oral PRN Stuart Rees MD        dextrose bolus 10% 125 mL  125 mL IntraVENous PRN Stuart Rees MD        Or    dextrose bolus 10% 250 mL  250 mL IntraVENous PRN Stuart Rees MD        glucagon (rDNA) injection 1 mg  1 mg SubCUTAneous PRN Stuart Rees MD        dextrose 10 % infusion   IntraVENous Continuous PRN Stuart Rees MD        isosorbide mononitrate (IMDUR) extended release tablet 30 mg  30 mg Oral Daily Stuart Rees MD   30 mg at 09/07/22 1016    metFORMIN (GLUCOPHAGE) tablet 1,000 mg  1,000 mg Oral BID JO-ANN Ruiz Héctor Lockett MD   1,000 mg at 09/07/22 1640    primidone (MYSOLINE) tablet 50 mg  50 mg Oral Nightly Emanuel Mccollum MD   50 mg at 09/07/22 2018    sodium chloride flush 0.9 % injection 5-40 mL  5-40 mL IntraVENous 2 times per day Emanuel Mccollum MD   10 mL at 09/07/22 2016    sodium chloride flush 0.9 % injection 5-40 mL  5-40 mL IntraVENous PRN Emanuel Mccollum MD        0.9 % sodium chloride infusion   IntraVENous PRN Emanuel Mccollum MD        enoxaparin Sodium (LOVENOX) injection 30 mg  30 mg SubCUTAneous BID Emanuel Mccollum MD   30 mg at 09/07/22 2015    ondansetron (ZOFRAN-ODT) disintegrating tablet 4 mg  4 mg Oral Q8H PRN Emanuel Mccollum MD        Or    ondansetron [facility]) injection 4 mg  4 mg IntraVENous Q6H PRN Emanuel Mccollum MD        polyethylene glycol (GLYCOLAX) packet 17 g  17 g Oral Daily PRN Emanuel Mccollum MD        acetaminophen (TYLENOL) tablet 650 mg  650 mg Oral Q6H PRN Emanuel Mccollum MD   650 mg at 09/06/22 2040    Or    acetaminophen (TYLENOL) suppository 650 mg  650 mg Rectal Q6H PRN Emanuel Mccollum MD        perflutren lipid microspheres (DEFINITY) injection 1.65 mg  1.5 mL IntraVENous ONCE PRN Emanuel Mccollum MD        aspirin chewable tablet 81 mg  81 mg Oral Daily Emanuel Mccollum MD   81 mg at 09/07/22 1016    lisinopril (PRINIVIL;ZESTRIL) tablet 10 mg  10 mg Oral Daily Emanuel Mccollum MD   10 mg at 09/07/22 1016    metoprolol tartrate (LOPRESSOR) tablet 75 mg  75 mg Oral BID Emanuel Mccollum MD   75 mg at 09/07/22 2015    tamsulosin (FLOMAX) capsule 0.4 mg  0.4 mg Oral BID Emanuel Mccollum MD   0.4 mg at 09/07/22 2015      dextrose      sodium chloride         Physical Exam:  /71   Pulse (!) 106   Temp 97.8 °F (36.6 °C) (Oral)   Resp 18   Ht 6' 1\" (1.854 m)   Wt 246 lb 12.8 oz (111.9 kg)   SpO2 98%   BMI 32.56 kg/m²   Wt Readings from Last 3 Encounters:   09/08/22 246 lb 12.8 oz (111.9 kg)   03/30/21 243 lb (110.2 kg)   03/03/21 246 lb 11.2 oz (111.9 kg)     Appearance: Awake, alert, no acute respiratory distress  Skin: Intact, no rash  Head: Normocephalic, atraumatic  Eyes: EOMI, no conjunctival erythema  ENMT: No pharyngeal erythema, MMM, no rhinorrhea  Neck: Supple, no elevated JVP, no carotid bruits  Lungs: Clear to auscultation bilaterally. No wheezes, rales, or rhonchi. Cardiac: Regular rate and rhythm, +S1S2, no murmurs apparent  Abdomen: Soft, nontender, +bowel sounds  Extremities: Moves all extremities x 4, 1+ lower extremity edema  Neurologic: No focal motor deficits apparent, normal mood and affect  Peripheral Pulses: Intact posterior tibial pulses bilaterally    Intake/Output:    Intake/Output Summary (Last 24 hours) at 9/8/2022 0849  Last data filed at 9/8/2022 0625  Gross per 24 hour   Intake 720 ml   Output 1975 ml   Net -1255 ml     No intake/output data recorded.     Laboratory Tests:  Recent Labs     09/06/22  1225 09/07/22  0330 09/08/22  0330    134 138   K 5.6* 4.7 4.5   CL 92* 94* 97*   CO2 31* 31* 31*   BUN 32* 32* 36*   CREATININE 1.3* 1.3* 1.7*   GLUCOSE 206* 168* 72*   CALCIUM 9.3 8.8 8.6     Lab Results   Component Value Date/Time    MG 1.7 07/23/2021 04:59 AM     Recent Labs     09/06/22  1225   ALKPHOS 66   ALT 20   AST 19   PROT 6.8   BILITOT 0.4   LABALBU 4.2     Recent Labs     09/06/22  1225 09/07/22  0330 09/08/22  0330   WBC 9.6 7.5 4.9   RBC 3.89 3.63* 3.58*   HGB 10.9* 10.4* 10.1*   HCT 33.3* 31.2* 30.8*   MCV 85.6 86.0 86.0   MCH 28.0 28.7 28.2   MCHC 32.7 33.3 32.8   RDW 13.9 13.7 14.0    124* 105*   MPV 8.9 9.2 8.8     Lab Results   Component Value Date    TROPONINI <0.01 02/27/2021     Lab Results   Component Value Date    INR 1.1 09/04/2022    PROTIME 11.9 09/04/2022     Lab Results   Component Value Date    TSH 0.937 07/23/2021     Lab Results   Component Value Date    LABA1C 7.2 (H) 07/23/2021     Lab Results   Component Value Date     07/23/2021     Lab Results   Component Value Date    CHOL 84 07/23/2021     Lab Results Component Value Date    TRIG 52 2021     Lab Results   Component Value Date    HDL 52 2021     Lab Results   Component Value Date    LDLCHOLESTEROL 22 2021     Lab Results   Component Value Date    VLDL 10 2021     No results found for: CHOLHDLRATIO    Cardiac Tests:  EK/4/22: NSR 70 bpm No ST/T changes     Telemetry:   SR 60s without arrhythmias      Vitals and labs were reviewed: As above     Chest X-ray:   22  Impression   1. Cardiomegaly, small bilateral pleural effusions, and bibasilar   atelectasis or infiltrates. 2.  Right suprahilar mass. Please see recent CT chest report and with CT   recommendation for PET-CT evaluation or CT-guided tissue sampling. CT chest 22  Personally reviewed with LAD stent and coronary calcifications  Impression   1. Persistent medial right upper lobe soft tissue mass measuring 5.9 x 3.5 x   2.1 cm. Neoplasm should be excluded. Consider PET-CT examination or tissue   sampling. 2.  Mild splenomegaly. 3.  New mild inferior endplate partial compression fracture of the vertebral   body of T9. No lytic or blastic masses are visible. 4.  Chronic moderate lobular thyromegaly with calcifications. Findings may   reflect thyroid goiter. Echocardiogram-2021:   LVEF 15% stage I diastolic dysfunction, severe left atrial enlargement, mild mitral regurgitation mild to moderate TR and moderate pulm hypertension with an estimated PASP of 55 mmHg    TTE-2022:   Normal left ventricle size and systolic function. Ejection fraction is visually estimated at 60-65%. No regional wall motion abnormalities seen. Normal left ventricle wall thickness. Indeterminate diastolic function. Normal right ventricular size and function. TAPSE 21 mm. No hemodynamically significant aortic stenosis is present. Unable to estimate PA systolic pressure. No evidence for hemodynamically significant pericardial effusion. Lexiscan nuclear stress test 9/8/2022:  1: The stress EKG report is provided separately. 2  This is a normal myocardial perfusion scan. There is no evidence of   ischemia or infarction. 3: The left ventricle is normal in size with a preserved ejection   fraction. 4: Prognostically, this is a low risk study. Cardiac catheterization September 2019: Left main no significant disease, LAD has 40% lesion and first diagonal has a 90% ostial lesion not amenable to intervention, RCA has 30% stenosis in the proximal area     -------------------------------------------------------------------------------------------------------------------------------------------------------------  IMPRESSION:  Severe dyspnea with exertion. Possible anginal equivalent, Improving. Cardiac cath in 2017 showed 90% ostial lesion of involving the first diagonal artery but was not intervened due to technical issues. Patient was recommended medical therapy. Probable acute heart failure, unknown ejection fraction. proBNP only 143 pg/mL. Mildly hypervolemic  Coronary artery disease prior PCI to LAD and other coronary 8 to 9 years ago. No anginal symptoms, pleuritic chest pain mainly with coughing  Minimally elevated hs-cTnT: 34-29 ng/L. Likely acute myocardial injury, not consistent with ACS  Anemia Hgb 10.9>>10.4  GONZALES creatinine 1.4 -> 1.2>>1.5>> 1.3>> 1.7 with hyperkalemia, hyperkalemia resolved  Hypertension, current /71  DM2 insulin  Severe COPD with exacerbation, with chronic hypoxic respiratory failure on continuous oxygen  IFTIKHAR on nocturnal BiPAP  Lung mass being followed outpatient with plans for PET evaluation     RECOMMENDATIONS:     Change IV Lasix to 20 mg p.o. daily starting from tomorrow  Echo and cardiac cath results from [facility] were reviewed, as above  Strict I's and O's, change diet to cardiac/low-sodium, low potassium diet.    Echo results done on 9/7/2020 were reviewed, as above  Stress test today to rule out any ischemia in other territories  Continue medical treatment of CAD with aspirin, statin  Continue current cardiac medication otherwise  Patient being treated for COPD exacerbation per pulmonary  Further care per primary service and consultants  Aggressive risk factor modification  Hold discharge for today due to bump in creatinine level. Claudene Breath, MD., Larene Peabody.   [facility]) Cardiology

## 2023-07-27 ENCOUNTER — HOSPITAL ENCOUNTER (OUTPATIENT)
Dept: PULMONOLOGY | Facility: HOSPITAL | Age: 33
End: 2023-07-27
Payer: COMMERCIAL

## 2023-07-27 DIAGNOSIS — C56.9 MALIGNANT NEOPLASM OF OVARY, UNSPECIFIED LATERALITY (HCC): ICD-10-CM

## 2023-07-27 PROCEDURE — 94060 EVALUATION OF WHEEZING: CPT | Performed by: INTERNAL MEDICINE

## 2023-07-27 PROCEDURE — 94726 PLETHYSMOGRAPHY LUNG VOLUMES: CPT

## 2023-07-27 PROCEDURE — 94760 N-INVAS EAR/PLS OXIMETRY 1: CPT

## 2023-07-27 PROCEDURE — 94726 PLETHYSMOGRAPHY LUNG VOLUMES: CPT | Performed by: INTERNAL MEDICINE

## 2023-07-27 PROCEDURE — 94729 DIFFUSING CAPACITY: CPT

## 2023-07-27 PROCEDURE — 94729 DIFFUSING CAPACITY: CPT | Performed by: INTERNAL MEDICINE

## 2023-07-27 PROCEDURE — 94060 EVALUATION OF WHEEZING: CPT

## 2023-07-27 RX ORDER — ALBUTEROL SULFATE 2.5 MG/3ML
2.5 SOLUTION RESPIRATORY (INHALATION) ONCE AS NEEDED
Status: COMPLETED | OUTPATIENT
Start: 2023-07-27 | End: 2023-07-27

## 2023-07-27 RX ADMIN — ALBUTEROL SULFATE 2.5 MG: 2.5 SOLUTION RESPIRATORY (INHALATION) at 13:02

## 2023-07-28 ENCOUNTER — TELEPHONE (OUTPATIENT)
Dept: HEMATOLOGY ONCOLOGY | Facility: CLINIC | Age: 33
End: 2023-07-28

## 2023-07-28 PROBLEM — D70.1 CHEMOTHERAPY INDUCED NEUTROPENIA (HCC): Status: ACTIVE | Noted: 2023-07-28

## 2023-07-28 PROBLEM — T45.1X5A CHEMOTHERAPY INDUCED NEUTROPENIA (HCC): Status: ACTIVE | Noted: 2023-07-28

## 2023-07-28 NOTE — TELEPHONE ENCOUNTER
Spoke with PAC. LMOM for patient to arrive 7/31 at [address] to [address] in [facility].

## 2023-07-28 NOTE — TELEPHONE ENCOUNTER
Patient Call    Who are you speaking with? Patient    If it is not the patient, are they listed on an active communication consent form? N/A   What is the reason for this call? Pt has many question about starting treatment monday   Does this require a call back? Yes   If a call back is required, please list best call back number [phone]   If a call back is required, advise that a message will be forwarded to their care team and someone will return their call as soon as possible. Did you relay this information to the patient?  Yes

## 2023-07-29 ENCOUNTER — APPOINTMENT (OUTPATIENT)
Dept: LAB | Facility: HOSPITAL | Age: 33
End: 2023-07-29
Payer: COMMERCIAL

## 2023-07-29 LAB
AFP-TM SERPL-MCNC: 303.6 NG/ML (ref 0–9)
ALBUMIN SERPL BCP-MCNC: 4.3 G/DL (ref 3.5–5)
ALP SERPL-CCNC: 76 U/L (ref 34–104)
ALT SERPL W P-5'-P-CCNC: 13 U/L (ref 7–52)
ANION GAP SERPL CALCULATED.3IONS-SCNC: 9 MMOL/L
AST SERPL W P-5'-P-CCNC: 14 U/L (ref 13–39)
BASOPHILS # BLD AUTO: 0.05 THOUSANDS/ÂΜL (ref 0–0.1)
BASOPHILS NFR BLD AUTO: 1 % (ref 0–1)
BILIRUB SERPL-MCNC: 0.77 MG/DL (ref 0.2–1)
BUN SERPL-MCNC: 10 MG/DL (ref 5–25)
CALCIUM SERPL-MCNC: 9.5 MG/DL (ref 8.4–10.2)
CANCER AG125 SERPL-ACNC: 25.4 U/ML (ref 0–35)
CHLORIDE SERPL-SCNC: 103 MMOL/L (ref 96–108)
CO2 SERPL-SCNC: 26 MMOL/L (ref 21–32)
CREAT SERPL-MCNC: 0.81 MG/DL (ref 0.6–1.3)
EOSINOPHIL # BLD AUTO: 0.3 THOUSAND/ÂΜL (ref 0–0.61)
EOSINOPHIL NFR BLD AUTO: 5 % (ref 0–6)
ERYTHROCYTE [DISTWIDTH] IN BLOOD BY AUTOMATED COUNT: 14.1 % (ref 11.6–15.1)
GFR SERPL CREATININE-BSD FRML MDRD: 95 ML/MIN/1.73SQ M
GLUCOSE P FAST SERPL-MCNC: 66 MG/DL (ref 65–99)
HCT VFR BLD AUTO: 41.9 % (ref 34.8–46.1)
HGB BLD-MCNC: 13.7 G/DL (ref 11.5–15.4)
IMM GRANULOCYTES # BLD AUTO: 0.02 THOUSAND/UL (ref 0–0.2)
IMM GRANULOCYTES NFR BLD AUTO: 0 % (ref 0–2)
LYMPHOCYTES # BLD AUTO: 2.07 THOUSANDS/ÂΜL (ref 0.6–4.47)
LYMPHOCYTES NFR BLD AUTO: 33 % (ref 14–44)
MAGNESIUM SERPL-MCNC: 2.1 MG/DL (ref 1.9–2.7)
MCH RBC QN AUTO: 29.4 PG (ref 26.8–34.3)
MCHC RBC AUTO-ENTMCNC: 32.7 G/DL (ref 31.4–37.4)
MCV RBC AUTO: 90 FL (ref 82–98)
MONOCYTES # BLD AUTO: 0.6 THOUSAND/ÂΜL (ref 0.17–1.22)
MONOCYTES NFR BLD AUTO: 10 % (ref 4–12)
NEUTROPHILS # BLD AUTO: 3.3 THOUSANDS/ÂΜL (ref 1.85–7.62)
NEUTS SEG NFR BLD AUTO: 51 % (ref 43–75)
NRBC BLD AUTO-RTO: 0 /100 WBCS
PLATELET # BLD AUTO: 294 THOUSANDS/UL (ref 149–390)
PMV BLD AUTO: 10.2 FL (ref 8.9–12.7)
POTASSIUM SERPL-SCNC: 4 MMOL/L (ref 3.5–5.3)
PROT SERPL-MCNC: 7.7 G/DL (ref 6.4–8.4)
RBC # BLD AUTO: 4.66 MILLION/UL (ref 3.81–5.12)
SODIUM SERPL-SCNC: 138 MMOL/L (ref 135–147)
WBC # BLD AUTO: 6.34 THOUSAND/UL (ref 4.31–10.16)

## 2023-07-29 PROCEDURE — 85025 COMPLETE CBC W/AUTO DIFF WBC: CPT | Performed by: NURSE PRACTITIONER

## 2023-07-29 PROCEDURE — 83735 ASSAY OF MAGNESIUM: CPT | Performed by: NURSE PRACTITIONER

## 2023-07-29 PROCEDURE — 80053 COMPREHEN METABOLIC PANEL: CPT | Performed by: NURSE PRACTITIONER

## 2023-07-29 PROCEDURE — 86304 IMMUNOASSAY TUMOR CA 125: CPT | Performed by: NURSE PRACTITIONER

## 2023-07-29 PROCEDURE — 82105 ALPHA-FETOPROTEIN SERUM: CPT | Performed by: NURSE PRACTITIONER

## 2023-07-29 PROCEDURE — 36415 COLL VENOUS BLD VENIPUNCTURE: CPT | Performed by: NURSE PRACTITIONER

## 2023-07-31 ENCOUNTER — HOSPITAL ENCOUNTER (INPATIENT)
Facility: HOSPITAL | Age: 33
LOS: 4 days | Discharge: HOME | End: 2023-08-04
Attending: OBSTETRICS & GYNECOLOGY | Admitting: OBSTETRICS & GYNECOLOGY
Payer: COMMERCIAL

## 2023-07-31 DIAGNOSIS — T45.1X5A CHEMOTHERAPY INDUCED NEUTROPENIA (HCC): Primary | ICD-10-CM

## 2023-07-31 DIAGNOSIS — D70.1 CHEMOTHERAPY INDUCED NEUTROPENIA (HCC): Primary | ICD-10-CM

## 2023-07-31 DIAGNOSIS — C56.9 MALIGNANT NEOPLASM OF OVARY, UNSPECIFIED LATERALITY (HCC): ICD-10-CM

## 2023-07-31 DIAGNOSIS — Z51.11 ADMISSION FOR CHEMOTHERAPY: ICD-10-CM

## 2023-07-31 PROCEDURE — 3E03305 INTRODUCTION OF OTHER ANTINEOPLASTIC INTO PERIPHERAL VEIN, PERCUTANEOUS APPROACH: ICD-10-PCS | Performed by: OBSTETRICS & GYNECOLOGY

## 2023-07-31 PROCEDURE — 99223 1ST HOSP IP/OBS HIGH 75: CPT | Performed by: OBSTETRICS & GYNECOLOGY

## 2023-07-31 RX ORDER — SODIUM CHLORIDE, SODIUM LACTATE, POTASSIUM CHLORIDE, CALCIUM CHLORIDE 600; 310; 30; 20 MG/100ML; MG/100ML; MG/100ML; MG/100ML
75 INJECTION, SOLUTION INTRAVENOUS CONTINUOUS
Status: DISCONTINUED | OUTPATIENT
Start: 2023-07-31 | End: 2023-08-04 | Stop reason: HOSPADM

## 2023-07-31 RX ORDER — ACETAMINOPHEN 325 MG/1
650 TABLET ORAL ONCE
Status: COMPLETED | OUTPATIENT
Start: 2023-07-31 | End: 2023-07-31

## 2023-07-31 RX ORDER — SODIUM CHLORIDE 9 MG/ML
20 INJECTION, SOLUTION INTRAVENOUS ONCE
Status: COMPLETED | OUTPATIENT
Start: 2023-07-31 | End: 2023-07-31

## 2023-07-31 RX ORDER — LIDOCAINE 40 MG/G
CREAM TOPICAL ONCE
Status: COMPLETED | OUTPATIENT
Start: 2023-07-31 | End: 2023-07-31

## 2023-07-31 RX ADMIN — SODIUM CHLORIDE 20 ML/HR: 0.9 INJECTION, SOLUTION INTRAVENOUS at 11:03

## 2023-07-31 RX ADMIN — ACETAMINOPHEN 325MG 650 MG: 325 TABLET ORAL at 12:01

## 2023-07-31 RX ADMIN — LIDOCAINE 4%: 4 CREAM TOPICAL at 10:17

## 2023-07-31 RX ADMIN — SODIUM CHLORIDE 500 ML: 0.9 INJECTION, SOLUTION INTRAVENOUS at 11:03

## 2023-07-31 RX ADMIN — CISPLATIN 38.8 MG: 50 INJECTION, SOLUTION INTRAVENOUS at 15:17

## 2023-07-31 RX ADMIN — ONDANSETRON: 2 INJECTION INTRAMUSCULAR; INTRAVENOUS at 12:00

## 2023-07-31 RX ADMIN — SODIUM CHLORIDE 500 ML: 0.9 INJECTION, SOLUTION INTRAVENOUS at 16:20

## 2023-07-31 RX ADMIN — SODIUM CHLORIDE, SODIUM LACTATE, POTASSIUM CHLORIDE, AND CALCIUM CHLORIDE 75 ML/HR: .6; .31; .03; .02 INJECTION, SOLUTION INTRAVENOUS at 19:15

## 2023-07-31 RX ADMIN — SODIUM CHLORIDE 30 UNITS: 9 INJECTION, SOLUTION INTRAVENOUS at 13:14

## 2023-07-31 RX ADMIN — SODIUM CHLORIDE 150 MG: 0.9 INJECTION, SOLUTION INTRAVENOUS at 12:33

## 2023-07-31 RX ADMIN — SODIUM CHLORIDE 194 MG: 0.9 INJECTION, SOLUTION INTRAVENOUS at 13:53

## 2023-07-31 RX ADMIN — DIPHENHYDRAMINE HYDROCHLORIDE 25 MG: 50 INJECTION, SOLUTION INTRAMUSCULAR; INTRAVENOUS at 13:08

## 2023-07-31 NOTE — ASSESSMENT & PLAN NOTE
6/13/23: status post OCHOA/BSO debulking for 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 There was no sign of any other tumor in the abdomen or other biopsies.   Chest x-ray was negative

## 2023-07-31 NOTE — ASSESSMENT & PLAN NOTE
Plan for Bleomycin 30 mg IV day 1 8 and 15 of a 21-day cycle, etoposide 100 mg per metered squared days 1 through 5 and Cisplatin 20 mg per metered squared days 1 through 5.   Plan for 4 cycles   S/p PFT on 7/27: DLCO corrected for patients hemoglobin level: 109 %, Normal spirometry  Lines:  Right internal jugular approach single lumen power port   FEN: regular diet  DVT PPX: Ambulation, SCDs  Meds ordered PRN for any chemotherapy-related symptoms         Component Ref Range & Units 7/29/23 0759   AFP TUMOR MARKER 0.00 - 9.00 ng/mL 303.60 High          Component Ref Range & Units 7/29/23 0759 6/2/23 0954    0.0 - 35.0 U/mL 25.4  214.4 High  CM

## 2023-07-31 NOTE — PLAN OF CARE
Problem: METABOLIC, FLUID AND ELECTROLYTES - ADULT  Goal: Electrolytes maintained within normal limits  Description: INTERVENTIONS:  - Monitor labs and assess patient for signs and symptoms of electrolyte imbalances  - Administer electrolyte replacement as ordered  - Monitor response to electrolyte replacements, including repeat lab results as appropriate  - Instruct patient on fluid and nutrition as appropriate  Outcome: Progressing  Goal: Fluid balance maintained  Description: INTERVENTIONS:  - Monitor labs   - Monitor I/O and WT  - Instruct patient on fluid and nutrition as appropriate  - Assess for signs & symptoms of volume excess or deficit  Outcome: Progressing     Problem: GASTROINTESTINAL - ADULT  Goal: Minimal or absence of nausea and/or vomiting  Description: INTERVENTIONS:  - Administer IV fluids if ordered to ensure adequate hydration  - Maintain NPO status until nausea and vomiting are resolved  - Nasogastric tube if ordered  - Administer ordered antiemetic medications as needed  - Provide nonpharmacologic comfort measures as appropriate  - Advance diet as tolerated, if ordered  - Consider nutrition services referral to assist patient with adequate nutrition and appropriate food choices  Outcome: Progressing  Goal: Maintains adequate nutritional intake  Description: INTERVENTIONS:  - Monitor percentage of each meal consumed  - Identify factors contributing to decreased intake, treat as appropriate  - Assist with meals as needed  - Monitor I&O, weight, and lab values if indicated  - Obtain nutrition services referral as needed  Outcome: Progressing     Problem: GENITOURINARY - ADULT  Goal: Maintains or returns to baseline urinary function  Description: INTERVENTIONS:  - Assess urinary function  - Encourage oral fluids to ensure adequate hydration if ordered  - Administer IV fluids as ordered to ensure adequate hydration  - Administer ordered medications as needed  - Offer frequent toileting  - Follow urinary retention protocol if ordered  Outcome: Progressing     Problem: RESPIRATORY - ADULT  Goal: Achieves optimal ventilation and oxygenation  Description: INTERVENTIONS:  - Assess for changes in respiratory status  - Assess for changes in mentation and behavior  - Position to facilitate oxygenation and minimize respiratory effort  - Oxygen administered by appropriate delivery if ordered  - Initiate smoking cessation education as indicated  - Encourage broncho-pulmonary hygiene including cough, deep breathe, Incentive Spirometry  - Assess the need for suctioning and aspirate as needed  - Assess and instruct to report SOB or any respiratory difficulty  - Respiratory Therapy support as indicated  Outcome: Progressing     Problem: HEMATOLOGIC - ADULT  Goal: Maintains hematologic stability  Description: INTERVENTIONS  - Assess for signs and symptoms of bleeding or hemorrhage  - Monitor labs  - Administer supportive blood products/factors as ordered and appropriate  Outcome: Progressing

## 2023-07-31 NOTE — H&P
For questions/concerns on this patient, please reach out to the following:  [facility]- GynOn Resident       H&P Exam - Gynecology Oncology  Irma Graf 27 y.o. female MRN: [id number]  Unit/Bed#: E2 -01 Encounter: [id number]    Assessment/Plan   Malignant neoplasm of ovary [facility])  Assessment & Plan  Plan for Bleomycin 30 mg IV day 1 8 and 15 of a 21-day cycle, etoposide 100 mg per metered squared days 1 through 5 and Cisplatin 20 mg per metered squared days 1 through 5. Plan for 4 cycles   S/p PFT on 7/27: DLCO corrected for patients hemoglobin level: 109 %, Normal spirometry  Lines:  Right internal jugular approach single lumen power port   FEN: regular diet  DVT PPX: Ambulation, SCDs  Meds ordered PRN for any chemotherapy-related symptoms            Component Ref Range & Units 7/29/23 0759   AFP TUMOR MARKER 0.00 - 9.00 ng/mL 303.60 High          Component Ref Range & Units 7/29/23 0759 6/2/23 0954    0.0 - 35.0 U/mL 25.4  214.4 High  CM              Status post total abdominal hysterectomy and bilateral salpingo-oophorectomy (OCHOA-BSO)  Assessment & Plan  6/13/23: status post OCHOA/BSO debulking for 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 There was no sign of any other tumor in the abdomen or other biopsies. Chest x-ray was negative     History of Present Illness     HPI:  Irma Graf is a 27 y.o. female who presents for first cycle of BEC chemotherapy. No complaints this morning on admission and is feeling well. Oncology History   Malignant neoplasm of ovary ([address])   6/13/2023 Surgery    Patient underwent exploratory laparotomy OCHOA/BSO and staging procedure. Intra-Op findings revealed a large left adnexal mass approximately 12 to 14 cm densely adherent to the sigmoid colon.   It ruptured releasing chocolate fluid into the abdomen however prior staining of the omentum indicated likely previous rupture. Final pathology report after extensive review by [facility] was most consistent with endometrioid adenocarcinoma initiating within endometriosis within the ovary. Additionally yolk sac tumor was identified which extended to the local:. Making this a stage IIB tumor. Would recommend 4 cycles of bleomycin etoposide and cis-platinum. 7/11/2023 Initial Diagnosis    Malignant neoplasm of left ovary ([address])     7/11/2023 -  Cancer Staged    Staging form: Ovary, Fallopian Tube, Primary Peritoneal, AJCC 8th Edition  - Clinical stage from 7/11/2023: Stage IIB (cT2b, cN0, cM0) - Signed by Reyes Carbo, MD on 7/11/2023  Histopathologic type: Yolk sac tumor  Stage prefix: Initial diagnosis       7/31/2023 -  Chemotherapy    alteplase (CATHFLO), 2 mg, Intracatheter, Every 1 Minute as needed, 1 of 4 cycles  pegfilgrastim (NEULASTA ONPRO), 6 mg, Subcutaneous, Once, 1 of 4 cycles  bleomycin (BLENOXANE) IVPB, 30 Units, Intravenous, Once, 1 of 4 cycles  CISplatin (PLATINOL) infusion, 20 mg/m2 = 38.8 mg, Intravenous, Once, 1 of 4 cycles  fosaprepitant (EMEND) IVPB, 150 mg, Intravenous, Once, 1 of 4 cycles         Review of Systems   Constitutional: Negative for chills and fever. HENT: Negative for ear pain and sore throat. Eyes: Negative for pain and visual disturbance. Respiratory: Negative for cough and shortness of breath. Cardiovascular: Negative for chest pain and palpitations. Gastrointestinal: Negative for abdominal pain and vomiting. Genitourinary: Negative for dysuria and hematuria. Musculoskeletal: Negative for arthralgias and back pain. Skin: Negative for color change and rash. Neurological: Negative for seizures and syncope. All other systems reviewed and are negative.       Historical Information   Past Medical History:   Diagnosis Date   • No known health problems    • PONV (postoperative nausea and vomiting)      Past Surgical History:   Procedure Laterality Date   • HYSTERECTOMY N/A 2023    Procedure: OCHOA/BSO, STAGING radical omentectomy bilateral pelvic lymph node dissection;  Surgeon: Yesenia Monsivais MD;  [facility];  Service: Gynecology Oncology   • IR PORT PLACEMENT  2023   • ORIF TIBIA & FIBULA FRACTURES Right    • IA EXPLORATORY LAPAROTOMY CELIOTOMY W/WO BIOPSY SPX N/A 2023    Procedure: EX LAP;  Surgeon: Yesenia Monsivais MD;  Location[facility];  Service: Gynecology Oncology   • URETERAL STENT PLACEMENT Bilateral 2023    Procedure: CYSTO; STENT URETERAL;  Surgeon: Aguilar Membreno MD;  Location: [facility];  Service: Urology   • WISDOM TOOTH EXTRACTION       OB History    Para Term  AB Living   0 0 0 0 0 0   SAB IAB Ectopic Multiple Live Births   0 0 0 0 0     Family History   Problem Relation Age of Onset   • Ovarian cysts Mother    • No Known Problems Father      Social History   Social History     Substance and Sexual Activity   Alcohol Use Yes   • Alcohol/week: 5.0 standard drinks of alcohol   • Types: 5 Standard drinks or equivalent per week    Comment: Occasional     Social History     Substance and Sexual Activity   Drug Use Yes   • Types: Marijuana    Comment: daily vape last used      Social History     Tobacco Use   Smoking Status Never   Smokeless Tobacco Never       Meds/Allergies   Medications Prior to Admission   Medication   • cholecalciferol (VITAMIN D3) 1,000 units tablet   • LORazepam (ATIVAN) 1 mg tablet   • Multiple Vitamin (multivitamin) tablet   • ondansetron (ZOFRAN) 8 mg tablet     Allergies   Allergen Reactions   • Penicillins Itching and Rash     Rash  Rash         Objective     BP (!) 149/101 (BP Location: Right arm)   Pulse 86   Temp 98.1 °F (36.7 °C) (Temporal)   Resp 18   Ht 5' 7" (1.702 m)   Wt 83.4 kg (183 lb 13.8 oz)   LMP 05/15/2023 (Exact Date)   SpO2 97%   BMI 28.80 kg/m²     No intake/output data recorded. No intake/output data recorded.     Lab Results   Component Value Date    WBC 6.34 07/29/2023    HGB 13.7 07/29/2023    HCT 41.9 07/29/2023    MCV 90 07/29/2023     07/29/2023       Lab Results   Component Value Date    CALCIUM 9.5 07/29/2023    K 4.0 07/29/2023    CO2 26 07/29/2023     07/29/2023    BUN 10 07/29/2023    CREATININE 0.81 07/29/2023       Physical Exam  Vitals reviewed. Constitutional:       General: She is not in acute distress. Appearance: She is not ill-appearing. HENT:      Head: Normocephalic and atraumatic. Cardiovascular:      Rate and Rhythm: Normal rate and regular rhythm. Pulses: Normal pulses. Heart sounds: No murmur heard. Pulmonary:      Effort: Pulmonary effort is normal. No respiratory distress. Abdominal:      Palpations: Abdomen is soft. Tenderness: There is no abdominal tenderness. Musculoskeletal:         General: No tenderness. Normal range of motion. Right lower leg: No edema. Left lower leg: No edema. Skin:     General: Skin is warm and dry. Neurological:      General: No focal deficit present. Mental Status: She is alert. Motor: No weakness. Psychiatric:         Mood and Affect: Mood normal.         Judgment: Judgment normal.         Imaging: I have personally reviewed pertinent reports.     EKG, Pathology, and Other Studies: I have personally reviewed pertinent films in PACS      Code Status: Level 1 - Full Code    Kiersten Dixon DO  7/31/2023  9:48 AM

## 2023-07-31 NOTE — DISCHARGE SUMMARY
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]    Procedures:   [facility] course: Patient ***    Lab Results:   Lab Results   Component Value Date    WBC 6.34 07/29/2023    HGB 13.7 07/29/2023    HCT 41.9 07/29/2023    MCV 90 07/29/2023     07/29/2023     Lab Results   Component Value Date    CALCIUM 9.5 07/29/2023    K 4.0 07/29/2023    CO2 26 07/29/2023     07/29/2023    BUN 10 07/29/2023    CREATININE 0.81 07/29/2023     No results found for: "POCGLU"  Lab Results   Component Value Date    PTT 29 05/22/2023     Lab Results   Component Value Date    INR 0.99 05/22/2023    PROTIME 13.2 54/67/2008       Complications: none apparent    Condition at discharge: stable     Discharge instructions/Information to patient and family:   See After Visit Summary for information provided to patient and family. Provisions for Follow-Up Care:  See After Visit Summary for information related to follow-up care and any pertinent home health orders. Disposition: See After Visit Summary for discharge disposition information. Planned Readmission: Yes, pending clinical status    Discharge Medications: For a complete list of the patient's medications, please refer to her med rec.     Chapo Trinidad MD  7/31/2023  5:54 PM to her med rec.     Yola Mcgrath MD  7/31/2023  5:54 PM

## 2023-08-01 PROCEDURE — 3E03305 INTRODUCTION OF OTHER ANTINEOPLASTIC INTO PERIPHERAL VEIN, PERCUTANEOUS APPROACH: ICD-10-PCS | Performed by: OBSTETRICS & GYNECOLOGY

## 2023-08-01 PROCEDURE — 99024 POSTOP FOLLOW-UP VISIT: CPT | Performed by: OBSTETRICS & GYNECOLOGY

## 2023-08-01 RX ORDER — SODIUM CHLORIDE 9 MG/ML
20 INJECTION, SOLUTION INTRAVENOUS ONCE
Status: COMPLETED | OUTPATIENT
Start: 2023-08-01 | End: 2023-08-01

## 2023-08-01 RX ORDER — LANOLIN ALCOHOL/MO/W.PET/CERES
3 CREAM (GRAM) TOPICAL
Status: DISCONTINUED | OUTPATIENT
Start: 2023-08-01 | End: 2023-08-04 | Stop reason: HOSPADM

## 2023-08-01 RX ADMIN — SODIUM CHLORIDE 500 ML: 0.9 INJECTION, SOLUTION INTRAVENOUS at 11:47

## 2023-08-01 RX ADMIN — SODIUM CHLORIDE, SODIUM LACTATE, POTASSIUM CHLORIDE, AND CALCIUM CHLORIDE 75 ML/HR: .6; .31; .03; .02 INJECTION, SOLUTION INTRAVENOUS at 08:42

## 2023-08-01 RX ADMIN — ONDANSETRON: 2 INJECTION INTRAMUSCULAR; INTRAVENOUS at 12:30

## 2023-08-01 RX ADMIN — SODIUM CHLORIDE 500 ML: 0.9 INJECTION, SOLUTION INTRAVENOUS at 15:47

## 2023-08-01 RX ADMIN — CISPLATIN 38.8 MG: 50 INJECTION, SOLUTION INTRAVENOUS at 14:14

## 2023-08-01 RX ADMIN — Medication 3 MG: at 22:02

## 2023-08-01 RX ADMIN — Medication 3 MG: at 00:12

## 2023-08-01 RX ADMIN — SODIUM CHLORIDE 194 MG: 0.9 INJECTION, SOLUTION INTRAVENOUS at 13:08

## 2023-08-01 RX ADMIN — SODIUM CHLORIDE 20 ML/HR: 0.9 INJECTION, SOLUTION INTRAVENOUS at 12:57

## 2023-08-01 NOTE — PLAN OF CARE
Problem: GASTROINTESTINAL - ADULT  Goal: Minimal or absence of nausea and/or vomiting  Description: INTERVENTIONS:  - Administer IV fluids if ordered to ensure adequate hydration  - Maintain NPO status until nausea and vomiting are resolved  - Nasogastric tube if ordered  - Administer ordered antiemetic medications as needed  - Provide nonpharmacologic comfort measures as appropriate  - Advance diet as tolerated, if ordered  - Consider nutrition services referral to assist patient with adequate nutrition and appropriate food choices  Outcome: Progressing  Goal: Maintains adequate nutritional intake  Description: INTERVENTIONS:  - Monitor percentage of each meal consumed  - Identify factors contributing to decreased intake, treat as appropriate  - Assist with meals as needed  - Monitor I&O, weight, and lab values if indicated  - Obtain nutrition services referral as needed  Outcome: Progressing     Problem: GENITOURINARY - ADULT  Goal: Maintains or returns to baseline urinary function  Description: INTERVENTIONS:  - Assess urinary function  - Encourage oral fluids to ensure adequate hydration if ordered  - Administer IV fluids as ordered to ensure adequate hydration  - Administer ordered medications as needed  - Offer frequent toileting  - Follow urinary retention protocol if ordered  Outcome: Progressing     Problem: METABOLIC, FLUID AND ELECTROLYTES - ADULT  Goal: Electrolytes maintained within normal limits  Description: INTERVENTIONS:  - Monitor labs and assess patient for signs and symptoms of electrolyte imbalances  - Administer electrolyte replacement as ordered  - Monitor response to electrolyte replacements, including repeat lab results as appropriate  - Instruct patient on fluid and nutrition as appropriate  Outcome: Progressing  Goal: Fluid balance maintained  Description: INTERVENTIONS:  - Monitor labs   - Monitor I/O and WT  - Instruct patient on fluid and nutrition as appropriate  - Assess for signs & symptoms of volume excess or deficit  Outcome: Progressing

## 2023-08-01 NOTE — RESTORATIVE TECHNICIAN NOTE
Restorative Technician Note      Patient Name: Aneta Sprague     Restorative Tech Visit Date: 08/01/23  Note Type: Mobility  Patient Position Upon Consult: Supine  Activity Performed: Ambulated  Patient Position at End of Consult: Supine;  All needs within reach English

## 2023-08-01 NOTE — PLAN OF CARE
Problem: RESPIRATORY - ADULT  Goal: Achieves optimal ventilation and oxygenation  Description: INTERVENTIONS:  - Assess for changes in respiratory status  - Assess for changes in mentation and behavior  - Position to facilitate oxygenation and minimize respiratory effort  - Oxygen administered by appropriate delivery if ordered  - Initiate smoking cessation education as indicated  - Encourage broncho-pulmonary hygiene including cough, deep breathe, Incentive Spirometry  - Assess the need for suctioning and aspirate as needed  - Assess and instruct to report SOB or any respiratory difficulty  - Respiratory Therapy support as indicated  Outcome: Progressing     Problem: GASTROINTESTINAL - ADULT  Goal: Minimal or absence of nausea and/or vomiting  Description: INTERVENTIONS:  - Administer IV fluids if ordered to ensure adequate hydration  - Maintain NPO status until nausea and vomiting are resolved  - Nasogastric tube if ordered  - Administer ordered antiemetic medications as needed  - Provide nonpharmacologic comfort measures as appropriate  - Advance diet as tolerated, if ordered  - Consider nutrition services referral to assist patient with adequate nutrition and appropriate food choices  Outcome: Progressing  Goal: Maintains adequate nutritional intake  Description: INTERVENTIONS:  - Monitor percentage of each meal consumed  - Identify factors contributing to decreased intake, treat as appropriate  - Assist with meals as needed  - Monitor I&O, weight, and lab values if indicated  - Obtain nutrition services referral as needed  Outcome: Progressing     Problem: GENITOURINARY - ADULT  Goal: Maintains or returns to baseline urinary function  Description: INTERVENTIONS:  - Assess urinary function  - Encourage oral fluids to ensure adequate hydration if ordered  - Administer IV fluids as ordered to ensure adequate hydration  - Administer ordered medications as needed  - Offer frequent toileting  - Follow urinary retention protocol if ordered  Outcome: Progressing     Problem: METABOLIC, FLUID AND ELECTROLYTES - ADULT  Goal: Electrolytes maintained within normal limits  Description: INTERVENTIONS:  - Monitor labs and assess patient for signs and symptoms of electrolyte imbalances  - Administer electrolyte replacement as ordered  - Monitor response to electrolyte replacements, including repeat lab results as appropriate  - Instruct patient on fluid and nutrition as appropriate  Outcome: Progressing  Goal: Fluid balance maintained  Description: INTERVENTIONS:  - Monitor labs   - Monitor I/O and WT  - Instruct patient on fluid and nutrition as appropriate  - Assess for signs & symptoms of volume excess or deficit  Outcome: Progressing     Problem: HEMATOLOGIC - ADULT  Goal: Maintains hematologic stability  Description: INTERVENTIONS  - Assess for signs and symptoms of bleeding or hemorrhage  - Monitor labs  - Administer supportive blood products/factors as ordered and appropriate  Outcome: Progressing     Problem: COPING  Goal: Pt/Family able to verbalize concerns and demonstrate effective coping strategies  Description: INTERVENTIONS:  - Assist patient/family to identify coping skills, available support systems and cultural and spiritual values  - Provide emotional support, including active listening and acknowledgement of concerns of patient and caregivers  - Reduce environmental stimuli, as able  - Provide patient education  - Assess for spiritual pain/suffering and initiate spiritual care, including notification of Pastoral Care or jacqueline based community as needed  - Assess effectiveness of coping strategies  Outcome: Progressing

## 2023-08-01 NOTE — UTILIZATION REVIEW
Initial Clinical Review    Admission: Date/Time/Statement:   Admission Orders (From admission, onward)     Ordered        07/31/23 0847  Inpatient Admission  Once                      Orders Placed This Encounter   Procedures   • Inpatient Admission     Standing Status:   Standing     Number of Occurrences:   1     Order Specific Question:   Level of Care     Answer:   Med Surg [16]     Order Specific Question:   Estimated length of stay     Answer:   More than 2 Midnights     Order Specific Question:   Certification     Answer:   I certify that inpatient services are medically necessary for this patient for a duration of greater than two midnights. See H&P and MD Progress Notes for additional information about the patient's course of treatment. Initial Presentation: 27 y.o. female presents as an elective admission for chemotherapy cycle #1 of BEP (Bleomycin, Etoposide, Cisplatin) x 5 days for ovarian CA. This is cycle 1 of 4. PMH: s/P OCHOA/BSO. She is admitted to INPATIENT status with S/p PFT on 7/27: DLCO corrected for patients hemoglobin level: 109 %, Normal spirometry. Lines:  Right internal jugular approach single lumen power port. FEN: regular diet. DVT PPX: Ambulation, SCDs. Meds ordered PRN for any chemotherapy-related symptoms. Date: 8/1   Day 2:   Pt is tolerating chemo. No c/o pain. Is voiding, ambulating, having BMs, tolerating PO intake, no N/V.   She has no deficits on exam.         Wt Readings from Last 1 Encounters:   07/31/23 83.4 kg (183 lb 13.8 oz)     Additional Vital Signs:   08/01/23 0751 97.8 °F (36.6 °C) 75 18 162/123 Abnormal  135 99 % None (Room air) Lying   07/31/23 2320 97.6 °F (36.4 °C) 64 18 122/79 -- 100 % -- Lying   07/31/23 1630 97.8 °F (36.6 °C) 85 18 133/89 106 98 % None (Room air) Sitting   07/31/23 1557 97.7 °F (36.5 °C) 74 18 146/91 102 97 % None (Room air) Sitting   07/31/23 1542 98.1 °F (36.7 °C) 78 18 153/100  125 97 % None (Room air) Sitting   BP: saying bye to friends at 07/31/23 1542   07/31/23 1457 97.4 °F (36.3 °C) Abnormal  77 18 130/80 100 98 % None (Room air) Sitting   07/31/23 1432 98.4 °F (36.9 °C) 75 18 141/89 109 98 % None (Room air) Sitting   07/31/23 1351 98.2 °F (36.8 °C) 75 18 142/86 110 97 % None (Room air) Sitting   07/31/23 1344 -- 82 -- -- -- 97 % None (Room air) --   07/31/23 1316 98 °F (36.7 °C) 77 18 146/83 109 99 % None (Room air) Sitting   07/31/23 1139 97.7 °F (36.5 °C) 84 18 162/98 122 99 % None (Room air) Sitting   07/31/23 0827 98.1 °F (36.7 °C) 86 18 149/101 Abnormal  -- 97 % None (Room air) Sitting     Pertinent Labs/Diagnostic Test Results:       Results from last 7 days   Lab Units 07/29/23  0759   WBC Thousand/uL 6.34   HEMOGLOBIN g/dL 13.7   HEMATOCRIT % 41.9   PLATELETS Thousands/uL 294   NEUTROS ABS Thousands/µL 3.30         Results from last 7 days   Lab Units 07/29/23  0759   SODIUM mmol/L 138   POTASSIUM mmol/L 4.0   CHLORIDE mmol/L 103   CO2 mmol/L 26   ANION GAP mmol/L 9   BUN mg/dL 10   CREATININE mg/dL 0.81   EGFR ml/min/1.73sq m 95   CALCIUM mg/dL 9.5   MAGNESIUM mg/dL 2.1     Results from last 7 days   Lab Units 07/29/23  0759   AST U/L 14   ALT U/L 13   ALK PHOS U/L 76   TOTAL PROTEIN g/dL 7.7   ALBUMIN g/dL 4.3   TOTAL BILIRUBIN mg/dL 0.77     Past Medical History:   Diagnosis Date   • No known health problems    • PONV (postoperative nausea and vomiting)      Present on Admission:  • Status post total abdominal hysterectomy and bilateral salpingo-oophorectomy (OCHOA-BSO)  • Malignant neoplasm of ovary (HCC)      Admitting Diagnosis: 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 female  Admission Orders:  Scheduled Medications:  CISplatin (PLATINOL) 38.8 mg in sodium chloride 0.9 % 500 mL infusion, 20 mg/m2 (Treatment Plan Recorded), Intravenous, Once  etoposide, 100 mg/m2 (Treatment Plan Recorded), Intravenous, Once  melatonin, 3 mg, Oral, HS  ondansetron (ZOFRAN) 16 mg, dexamethasone (DECADRON) 10 mg in sodium chloride 0.9 % 50 mL IVPB, , Intravenous, Once  sodium chloride, 500 mL, Intravenous, Once  sodium chloride, 500 mL, Intravenous, Once  sodium chloride, 20 mL/hr, Intravenous, Once      Continuous IV Infusions:  lactated ringers, 75 mL/hr, Intravenous, Continuous      PRN Meds:  alteplase, 2 mg, Intracatheter, Q1MIN PRN  alteplase, 2 mg, Intracatheter, Q1MIN PRN    regular diet  Ambulate  Chemo protocol for labs - lytes, hepatic function. Network Utilization Review Department  ATTENTION: Please call with any questions or concerns to [phone] and carefully listen to the prompts so that you are directed to the right person. All voicemails are confidential.  Jeremie Esters all requests for admission clinical reviews, approved or denied determinations and any other requests to dedicated fax number below belonging to the campus where the patient is receiving treatment.  List of dedicated fax numbers for the Facilities:  [place] DENIALS (Administrative/Medical Necessity) [phone] [facility] [facility] [phone]   [address] [place] [address] [phone]   [facility] [address] [facility] [phone]   [address] [phone]   [address] [address] [place] [address] [phone]

## 2023-08-01 NOTE — PLAN OF CARE
Problem: Knowledge Deficit  Goal: Patient/family/caregiver demonstrates understanding of disease process, treatment plan, medications, and discharge instructions  Description: Complete learning assessment and assess knowledge base.   Interventions:  - Provide teaching at level of understanding  - Provide teaching via preferred learning methods  8/1/2023 0918 by Anitha Penaloza RN  Outcome: Progressing  8/1/2023 0918 by Anitha Penaloza RN  Outcome: Progressing     Problem: GASTROINTESTINAL - ADULT  Goal: Minimal or absence of nausea and/or vomiting  Description: INTERVENTIONS:  - Administer IV fluids if ordered to ensure adequate hydration  - Maintain NPO status until nausea and vomiting are resolved  - Nasogastric tube if ordered  - Administer ordered antiemetic medications as needed  - Provide nonpharmacologic comfort measures as appropriate  - Advance diet as tolerated, if ordered  - Consider nutrition services referral to assist patient with adequate nutrition and appropriate food choices  8/1/2023 0918 by Anitha Penaloza RN  Outcome: Progressing  8/1/2023 0918 by Anitha Penaloza RN  Outcome: Progressing  Goal: Maintains adequate nutritional intake  Description: INTERVENTIONS:  - Monitor percentage of each meal consumed  - Identify factors contributing to decreased intake, treat as appropriate  - Assist with meals as needed  - Monitor I&O, weight, and lab values if indicated  - Obtain nutrition services referral as needed  8/1/2023 0918 by Anitha Penaloza RN  Outcome: Progressing  8/1/2023 0918 by Anitha Penaloza RN  Outcome: Progressing     Problem: METABOLIC, FLUID AND ELECTROLYTES - ADULT  Goal: Electrolytes maintained within normal limits  Description: INTERVENTIONS:  - Monitor labs and assess patient for signs and symptoms of electrolyte imbalances  - Administer electrolyte replacement as ordered  - Monitor response to electrolyte replacements, including repeat lab results as appropriate  - Instruct patient on fluid and nutrition as appropriate  8/1/2023 0918 by Ethan Freed RN  Outcome: Progressing  8/1/2023 0918 by Ethan Freed RN  Outcome: Progressing  Goal: Fluid balance maintained  Description: INTERVENTIONS:  - Monitor labs   - Monitor I/O and WT  - Instruct patient on fluid and nutrition as appropriate  - Assess for signs & symptoms of volume excess or deficit  8/1/2023 0918 by Ethan Freed RN  Outcome: Progressing  8/1/2023 0918 by Ethan Freed RN  Outcome: Progressing

## 2023-08-01 NOTE — PROGRESS NOTES
Gyn Oncology Progress note   Aneta Sprague 27 y.o. female MRN: [id number]  Unit/Bed#: E2 -01 Encounter: [id number]    Assessment: 27 y. o. 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 Plan:  Malignant neoplasm of ovary ([address])  Assessment & Plan  Plan for Bleomycin 30 mg IV day 1 8 and 15 of a 21-day cycle, etoposide 100 mg per metered squared days 1 through 5 and Cisplatin 20 mg per metered squared days 1 through 5. Plan for 4 cycles   S/p PFT on 7/27: DLCO corrected for patients hemoglobin level: 109 %, Normal spirometry  Lines:  Right internal jugular approach single lumen power port   FEN: regular diet  DVT PPX: Ambulation, SCDs  Meds ordered PRN for any chemotherapy-related symptoms            Component Ref Range & Units 7/29/23 0759   AFP TUMOR MARKER 0.00 - 9.00 ng/mL 303.60 High          Component Ref Range & Units 7/29/23 0759 6/2/23 0954    0.0 - 35.0 U/mL 25.4  214.4 High  CM              Status post total abdominal hysterectomy and bilateral salpingo-oophorectomy (OCHOA-BSO)  Assessment & Plan  6/13/23: status post OCHOA/BSO debulking for 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 There was no sign of any other tumor in the abdomen or other biopsies. Chest x-ray was negative        Subjective:    Jennifer TORITO Walker has no current complaints. She denies any pain at this time. Patient is currently voiding. She is ambulating. She was previously constipated but is now having bowel movements. She is tolerating PO, and denies nausea or vomiting. She does not like the hospital food though so reports eating less than usual. Patient denies fever, chills, chest pain, shortness of breath, or calf tenderness.      /79 (BP Location: Right arm)   Pulse 64   Temp 97.6 °F (36.4 °C) (Temporal)   Resp 18   Ht 5' 7" (1.702 m)   Wt 83.4 kg (183 lb 13.8 oz)   LMP 05/15/2023 (Exact Date)   SpO2 100%   BMI 28.80 kg/m²     I/O last 3 completed shifts: In: 2709.4 [P.O.:400; I.V.:105.7; IV Piggyback:2203.7]  Out: -   No intake/output data recorded. Lab Results   Component Value Date    WBC 6.34 07/29/2023    HGB 13.7 07/29/2023    HCT 41.9 07/29/2023    MCV 90 07/29/2023     07/29/2023       Lab Results   Component Value Date    CALCIUM 9.5 07/29/2023    K 4.0 07/29/2023    CO2 26 07/29/2023     07/29/2023    BUN 10 07/29/2023    CREATININE 0.81 07/29/2023           Physical Exam  Constitutional:       Appearance: Normal appearance. HENT:      Head: Normocephalic and atraumatic. Eyes:      Extraocular Movements: Extraocular movements intact. Cardiovascular:      Rate and Rhythm: Normal rate. Pulmonary:      Effort: Pulmonary effort is normal.   Abdominal:      General: There is no distension. Palpations: Abdomen is soft. Tenderness: There is no abdominal tenderness. There is no guarding. Musculoskeletal:         General: Normal range of motion. Skin:     General: Skin is warm and dry. Neurological:      Mental Status: She is alert. Mental status is at baseline.    Psychiatric:         Mood and Affect: Mood normal.         Behavior: Behavior normal.           Carrie Watson MD  8/1/2023  6:30 AM

## 2023-08-02 ENCOUNTER — DOCUMENTATION (OUTPATIENT)
Dept: SURGICAL ONCOLOGY | Facility: CLINIC | Age: 33
End: 2023-08-02

## 2023-08-02 PROCEDURE — 99233 SBSQ HOSP IP/OBS HIGH 50: CPT | Performed by: OBSTETRICS & GYNECOLOGY

## 2023-08-02 PROCEDURE — 3E03305 INTRODUCTION OF OTHER ANTINEOPLASTIC INTO PERIPHERAL VEIN, PERCUTANEOUS APPROACH: ICD-10-PCS | Performed by: OBSTETRICS & GYNECOLOGY

## 2023-08-02 RX ORDER — SODIUM CHLORIDE 9 MG/ML
20 INJECTION, SOLUTION INTRAVENOUS ONCE
Status: COMPLETED | OUTPATIENT
Start: 2023-08-02 | End: 2023-08-02

## 2023-08-02 RX ADMIN — Medication 3 MG: at 23:58

## 2023-08-02 RX ADMIN — SODIUM CHLORIDE 500 ML: 0.9 INJECTION, SOLUTION INTRAVENOUS at 10:52

## 2023-08-02 RX ADMIN — SODIUM CHLORIDE 194 MG: 0.9 INJECTION, SOLUTION INTRAVENOUS at 12:25

## 2023-08-02 RX ADMIN — SODIUM CHLORIDE 500 ML: 0.9 INJECTION, SOLUTION INTRAVENOUS at 14:52

## 2023-08-02 RX ADMIN — CISPLATIN 38.8 MG: 50 INJECTION, SOLUTION INTRAVENOUS at 13:39

## 2023-08-02 RX ADMIN — SODIUM CHLORIDE 20 ML/HR: 0.9 INJECTION, SOLUTION INTRAVENOUS at 10:51

## 2023-08-02 RX ADMIN — ONDANSETRON: 2 INJECTION INTRAMUSCULAR; INTRAVENOUS at 11:48

## 2023-08-02 RX ADMIN — SODIUM CHLORIDE, SODIUM LACTATE, POTASSIUM CHLORIDE, AND CALCIUM CHLORIDE 75 ML/HR: .6; .31; .03; .02 INJECTION, SOLUTION INTRAVENOUS at 18:21

## 2023-08-02 NOTE — UTILIZATION REVIEW
Continued Stay Review    Date:    8/2/23                          Current Patient Class:   Inpatient  Current Level of Care:    Med surg    HPI:32 y.o. female initially admitted on    7/31   Elective admission  For chemo  X  5 days  For ovarian cancer     Assessment/Plan:   8/2    Tolerating chemo. Now  Day   #  3. Continue antiemetics as needed. Tolerating diet. Vital Signs:    96.8-69-18      147/95    Pertinent Labs/Diagnostic Results:       Results from last 7 days   Lab Units 07/29/23  0759   WBC Thousand/uL 6.34   HEMOGLOBIN g/dL 13.7   HEMATOCRIT % 41.9   PLATELETS Thousands/uL 294   NEUTROS ABS Thousands/µL 3.30         Results from last 7 days   Lab Units 07/29/23  0759   SODIUM mmol/L 138   POTASSIUM mmol/L 4.0   CHLORIDE mmol/L 103   CO2 mmol/L 26   ANION GAP mmol/L 9   BUN mg/dL 10   CREATININE mg/dL 0.81   EGFR ml/min/1.73sq m 95   CALCIUM mg/dL 9.5   MAGNESIUM mg/dL 2.1     Results from last 7 days   Lab Units 07/29/23  0759   AST U/L 14   ALT U/L 13   ALK PHOS U/L 76   TOTAL PROTEIN g/dL 7.7   ALBUMIN g/dL 4.3   TOTAL BILIRUBIN mg/dL 0.77                             Medications:   Scheduled Medications:  CISplatin (PLATINOL) 38.8 mg in sodium chloride 0.9 % 500 mL infusion, 20 mg/m2 (Treatment Plan Recorded), Intravenous, Once  etoposide, 100 mg/m2 (Treatment Plan Recorded), Intravenous, Once  melatonin, 3 mg, Oral, HS  sodium chloride, 500 mL, Intravenous, Once      Continuous IV Infusions:  lactated ringers, 75 mL/hr, Intravenous, Continuous      PRN Meds:  alteplase, 2 mg, Intracatheter, Q1MIN PRN  alteplase, 2 mg, Intracatheter, Q1MIN PRN  alteplase, 2 mg, Intracatheter, Q1MIN PRN        Discharge Plan:    D    Network Utilization Review Department  ATTENTION: Please call with any questions or concerns to [phone] and carefully listen to the prompts so that you are directed to the right person.  All voicemails are confidential.  [facility] all requests for admission clinical reviews, approved or denied determinations and any other requests to dedicated fax number below belonging to the campus where the patient is receiving treatment.  List of dedicated fax numbers for the Facilities:  [place] DENIALS (Administrative/Medical Necessity) [phone] [address] (Maternity/NICU/Pediatrics) [phone]   [address] [place] [address] [phone]   [facility] [address] [phone]   [address] [place] [address] [address] [facility] [address] [place] [address] [phone]   [address] [facility] [address] [facility] [address] [place] [address] [phone]

## 2023-08-02 NOTE — PROGRESS NOTES
Received Paperwork   What type of form  [employer]   Scanned blank form into patient's Epic chart  Yes   Method received form  Fax   Provider responsible for form  Dr. Lisset Elizondo   Informed patient our office turn around time for completing patient forms is 5-7 business days.   N/A     Comments

## 2023-08-02 NOTE — PLAN OF CARE
Problem: COPING  Goal: Pt/Family able to verbalize concerns and demonstrate effective coping strategies  Description: INTERVENTIONS:  - Assist patient/family to identify coping skills, available support systems and cultural and spiritual values  - Provide emotional support, including active listening and acknowledgement of concerns of patient and caregivers  - Reduce environmental stimuli, as able  - Provide patient education  - Assess for spiritual pain/suffering and initiate spiritual care, including notification of Pastoral Care or jacqueline based community as needed  - Assess effectiveness of coping strategies  Outcome: Progressing     Problem: METABOLIC, FLUID AND ELECTROLYTES - ADULT  Goal: Electrolytes maintained within normal limits  Description: INTERVENTIONS:  - Monitor labs and assess patient for signs and symptoms of electrolyte imbalances  - Administer electrolyte replacement as ordered  - Monitor response to electrolyte replacements, including repeat lab results as appropriate  - Instruct patient on fluid and nutrition as appropriate  Outcome: Progressing  Goal: Fluid balance maintained  Description: INTERVENTIONS:  - Monitor labs   - Monitor I/O and WT  - Instruct patient on fluid and nutrition as appropriate  - Assess for signs & symptoms of volume excess or deficit  Outcome: Progressing

## 2023-08-02 NOTE — PROGRESS NOTES
For questions/concerns on this patient, please reach out to the following:  [facility]   Gyn Oncology Progress note   Bia Rowley 27 y.o. female MRN: [id number]  Unit/Bed#: E2 -01 Encounter: [id number]    Assessment/Plan:    27 y. o. 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 Malignant neoplasm of ovary ([address])  Assessment & Plan  Plan for Bleomycin 30 mg IV day 1 8 and 15 of a 21-day cycle, etoposide 100 mg per metered squared days 1 through 5 and Cisplatin 20 mg per metered squared days 1 through 5. Plan for 4 cycles   S/p PFT on 7/27: DLCO corrected for patients hemoglobin level: 109 %, Normal spirometry  Lines:  Right internal jugular approach single lumen power port   FEN: regular diet  DVT PPX: Ambulation, SCDs  Meds ordered PRN for any chemotherapy-related symptoms            Component Ref Range & Units 7/29/23 0759   AFP TUMOR MARKER 0.00 - 9.00 ng/mL 303.60 High          Component Ref Range & Units 7/29/23 0759 6/2/23 0954    0.0 - 35.0 U/mL 25.4  214.4 High  CM              Status post total abdominal hysterectomy and bilateral salpingo-oophorectomy (OCHOA-BSO)  Assessment & Plan  6/13/23: status post OCHOA/BSO debulking for 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 There was no sign of any other tumor in the abdomen or other biopsies. Chest x-ray was negative           Subjective:    Jennifer Hale has no current complaints this morning. She reports that she slept much better last night than the night prior. She is currently voiding, passing flatus and having bowel movements. She has been ambulating patient denies fever, chills, chest pain, shortness of breath, nausea, vomiting or calf tenderness.      Objective:  /75 (BP Location: Right arm)   Pulse 77   Temp 97.8 °F (36.6 °C) (Temporal)   Resp 16   Ht 5' 7" (1.702 m)   Wt 83.4 kg (183 lb 13.8 oz)   LMP 05/15/2023 (Exact Date)   SpO2 99%   BMI 28.80 kg/m²     I/O last 3 completed shifts: In: 5390.4 [P.O.:1040; I.V.:1136.9; IV Piggyback:3213.4]  Out: -   No intake/output data recorded. Lab Results   Component Value Date    WBC 6.34 07/29/2023    HGB 13.7 07/29/2023    HCT 41.9 07/29/2023    MCV 90 07/29/2023     07/29/2023       Lab Results   Component Value Date    CALCIUM 9.5 07/29/2023    K 4.0 07/29/2023    CO2 26 07/29/2023     07/29/2023    BUN 10 07/29/2023    CREATININE 0.81 07/29/2023           Physical Exam  Vitals reviewed. Exam conducted with a chaperone present. Constitutional:       General: She is not in acute distress. Appearance: Normal appearance. HENT:      Right Ear: External ear normal.      Left Ear: External ear normal.   Cardiovascular:      Rate and Rhythm: Normal rate. Pulmonary:      Effort: Pulmonary effort is normal. No respiratory distress. Skin:     General: Skin is warm and dry. Neurological:      General: No focal deficit present. Mental Status: She is alert. Motor: No weakness.    Psychiatric:         Mood and Affect: Mood normal.         Judgment: Judgment normal.           John Hernandez DO  8/2/2023  5:28 AM

## 2023-08-03 LAB
ALBUMIN SERPL BCP-MCNC: 3.9 G/DL (ref 3.5–5)
ALP SERPL-CCNC: 64 U/L (ref 34–104)
ALT SERPL W P-5'-P-CCNC: 14 U/L (ref 7–52)
ANION GAP SERPL CALCULATED.3IONS-SCNC: 5 MMOL/L
AST SERPL W P-5'-P-CCNC: 12 U/L (ref 13–39)
BASOPHILS # BLD AUTO: 0.02 THOUSANDS/ÂΜL (ref 0–0.1)
BASOPHILS NFR BLD AUTO: 0 % (ref 0–1)
BILIRUB SERPL-MCNC: 0.49 MG/DL (ref 0.2–1)
BUN SERPL-MCNC: 9 MG/DL (ref 5–25)
CALCIUM SERPL-MCNC: 9 MG/DL (ref 8.4–10.2)
CHLORIDE SERPL-SCNC: 104 MMOL/L (ref 96–108)
CO2 SERPL-SCNC: 28 MMOL/L (ref 21–32)
CREAT SERPL-MCNC: 0.61 MG/DL (ref 0.6–1.3)
EOSINOPHIL # BLD AUTO: 0.01 THOUSAND/ÂΜL (ref 0–0.61)
EOSINOPHIL NFR BLD AUTO: 0 % (ref 0–6)
ERYTHROCYTE [DISTWIDTH] IN BLOOD BY AUTOMATED COUNT: 14 % (ref 11.6–15.1)
GFR SERPL CREATININE-BSD FRML MDRD: 118 ML/MIN/1.73SQ M
GLUCOSE SERPL-MCNC: 78 MG/DL (ref 65–140)
HCT VFR BLD AUTO: 37.3 % (ref 34.8–46.1)
HGB BLD-MCNC: 11.7 G/DL (ref 11.5–15.4)
IMM GRANULOCYTES # BLD AUTO: 0.05 THOUSAND/UL (ref 0–0.2)
IMM GRANULOCYTES NFR BLD AUTO: 1 % (ref 0–2)
LYMPHOCYTES # BLD AUTO: 2.16 THOUSANDS/ÂΜL (ref 0.6–4.47)
LYMPHOCYTES NFR BLD AUTO: 20 % (ref 14–44)
MAGNESIUM SERPL-MCNC: 2 MG/DL (ref 1.9–2.7)
MCH RBC QN AUTO: 28.5 PG (ref 26.8–34.3)
MCHC RBC AUTO-ENTMCNC: 31.4 G/DL (ref 31.4–37.4)
MCV RBC AUTO: 91 FL (ref 82–98)
MONOCYTES # BLD AUTO: 0.4 THOUSAND/ÂΜL (ref 0.17–1.22)
MONOCYTES NFR BLD AUTO: 4 % (ref 4–12)
NEUTROPHILS # BLD AUTO: 8.1 THOUSANDS/ÂΜL (ref 1.85–7.62)
NEUTS SEG NFR BLD AUTO: 75 % (ref 43–75)
NRBC BLD AUTO-RTO: 0 /100 WBCS
PHOSPHATE SERPL-MCNC: 2.6 MG/DL (ref 2.7–4.5)
PLATELET # BLD AUTO: 339 THOUSANDS/UL (ref 149–390)
PMV BLD AUTO: 10.3 FL (ref 8.9–12.7)
POTASSIUM SERPL-SCNC: 3.8 MMOL/L (ref 3.5–5.3)
PROT SERPL-MCNC: 7.1 G/DL (ref 6.4–8.4)
RBC # BLD AUTO: 4.1 MILLION/UL (ref 3.81–5.12)
SODIUM SERPL-SCNC: 137 MMOL/L (ref 135–147)
WBC # BLD AUTO: 10.74 THOUSAND/UL (ref 4.31–10.16)

## 2023-08-03 PROCEDURE — 99024 POSTOP FOLLOW-UP VISIT: CPT | Performed by: OBSTETRICS & GYNECOLOGY

## 2023-08-03 PROCEDURE — 83735 ASSAY OF MAGNESIUM: CPT

## 2023-08-03 PROCEDURE — 80053 COMPREHEN METABOLIC PANEL: CPT

## 2023-08-03 PROCEDURE — 3E03305 INTRODUCTION OF OTHER ANTINEOPLASTIC INTO PERIPHERAL VEIN, PERCUTANEOUS APPROACH: ICD-10-PCS | Performed by: OBSTETRICS & GYNECOLOGY

## 2023-08-03 PROCEDURE — 84100 ASSAY OF PHOSPHORUS: CPT

## 2023-08-03 PROCEDURE — 85025 COMPLETE CBC W/AUTO DIFF WBC: CPT

## 2023-08-03 RX ORDER — SODIUM CHLORIDE 9 MG/ML
20 INJECTION, SOLUTION INTRAVENOUS ONCE
Status: COMPLETED | OUTPATIENT
Start: 2023-08-03 | End: 2023-08-03

## 2023-08-03 RX ADMIN — DIBASIC SODIUM PHOSPHATE, MONOBASIC POTASSIUM PHOSPHATE AND MONOBASIC SODIUM PHOSPHATE 1 TABLET: 852; 155; 130 TABLET ORAL at 08:26

## 2023-08-03 RX ADMIN — SODIUM CHLORIDE 500 ML: 0.9 INJECTION, SOLUTION INTRAVENOUS at 09:36

## 2023-08-03 RX ADMIN — ONDANSETRON: 2 INJECTION INTRAMUSCULAR; INTRAVENOUS at 10:38

## 2023-08-03 RX ADMIN — SODIUM CHLORIDE, SODIUM LACTATE, POTASSIUM CHLORIDE, AND CALCIUM CHLORIDE 75 ML/HR: .6; .31; .03; .02 INJECTION, SOLUTION INTRAVENOUS at 03:08

## 2023-08-03 RX ADMIN — SODIUM CHLORIDE 500 ML: 0.9 INJECTION, SOLUTION INTRAVENOUS at 13:58

## 2023-08-03 RX ADMIN — CISPLATIN 38.8 MG: 50 INJECTION, SOLUTION INTRAVENOUS at 12:48

## 2023-08-03 RX ADMIN — SODIUM CHLORIDE 194 MG: 0.9 INJECTION, SOLUTION INTRAVENOUS at 11:34

## 2023-08-03 RX ADMIN — SODIUM CHLORIDE, SODIUM LACTATE, POTASSIUM CHLORIDE, AND CALCIUM CHLORIDE 75 ML/HR: .6; .31; .03; .02 INJECTION, SOLUTION INTRAVENOUS at 22:02

## 2023-08-03 RX ADMIN — Medication 3 MG: at 22:02

## 2023-08-03 RX ADMIN — SODIUM CHLORIDE 20 ML/HR: 0.9 INJECTION, SOLUTION INTRAVENOUS at 09:35

## 2023-08-03 NOTE — RESTORATIVE TECHNICIAN NOTE
Restorative Technician Note      Patient Name: Soumya Mayers     Restorative Tech Visit Date: 08/03/23  Note Type: Mobility  Patient Position Upon Consult: Supine  Activity Performed: Ambulated  Patient Position at End of Consult: Supine;  All needs within reach

## 2023-08-03 NOTE — PLAN OF CARE
Problem: GASTROINTESTINAL - ADULT  Goal: Minimal or absence of nausea and/or vomiting  Description: INTERVENTIONS:  - Administer IV fluids if ordered to ensure adequate hydration  - Maintain NPO status until nausea and vomiting are resolved  - Nasogastric tube if ordered  - Administer ordered antiemetic medications as needed  - Provide nonpharmacologic comfort measures as appropriate  - Advance diet as tolerated, if ordered  - Consider nutrition services referral to assist patient with adequate nutrition and appropriate food choices  Outcome: Progressing  Goal: Maintains adequate nutritional intake  Description: INTERVENTIONS:  - Monitor percentage of each meal consumed  - Identify factors contributing to decreased intake, treat as appropriate  - Assist with meals as needed  - Monitor I&O, weight, and lab values if indicated  - Obtain nutrition services referral as needed  Outcome: Progressing     Problem: COPING  Goal: Pt/Family able to verbalize concerns and demonstrate effective coping strategies  Description: INTERVENTIONS:  - Assist patient/family to identify coping skills, available support systems and cultural and spiritual values  - Provide emotional support, including active listening and acknowledgement of concerns of patient and caregivers  - Reduce environmental stimuli, as able  - Provide patient education  - Assess for spiritual pain/suffering and initiate spiritual care, including notification of Pastoral Care or jacqueline based community as needed  - Assess effectiveness of coping strategies  Outcome: Progressing

## 2023-08-03 NOTE — PROGRESS NOTES
For questions/concerns on this patient, please reach out to the following:  [facility]- GynOn Resident   Gyn Oncology Progress note   Collin Eaton 27 y.o. female MRN: [id number]  Unit/Bed#: E2 -01 Encounter: [id number]    Assessment/Plan:    27 y. o.  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 Malignant neoplasm of ovary ([address])  Assessment & Plan  Plan for Bleomycin 30 mg IV day 1 8 and 15 of a 21-day cycle, etoposide 100 mg per metered squared days 1 through 5 and Cisplatin 20 mg per metered squared days 1 through 5. Plan for 4 cycles   S/p PFT on 7/27: DLCO corrected for patients hemoglobin level: 109 %, Normal spirometry  Lines:  Right internal jugular approach single lumen power port   FEN: regular diet  DVT PPX: Ambulation, SCDs  Meds ordered PRN for any chemotherapy-related symptoms            Component Ref Range & Units 7/29/23 0759   AFP TUMOR MARKER 0.00 - 9.00 ng/mL 303.60 High          Component Ref Range & Units 7/29/23 0759 6/2/23 0954    0.0 - 35.0 U/mL 25.4  214.4 High  CM              Status post total abdominal hysterectomy and bilateral salpingo-oophorectomy (OCHOA-BSO)  Assessment & Plan  6/13/23: status post OCHOA/BSO debulking for 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 There was no sign of any other tumor in the abdomen or other biopsies. Chest x-ray was negative       Subjective:    Jennifer Hale has no current complaints this morning and is overall feeling well. Had an episode of nausea overnight, resolved with crackers and ginger ale. Did not require any antiemetic medications. Notes that she has been able to sleep well. Has been ambulating without difficulty.      Objective:  /82   Pulse 74   Temp (!) 97.1 °F (36.2 °C) (Temporal)   Resp 20   Ht 5' 7" (1.702 m)   Wt 83.4 kg (183 lb 13.8 oz)   LMP 05/15/2023 (Exact Date)   SpO2 97%   BMI 28.80 kg/m²     I/O last 3 completed shifts: In: 4154.2 [P.O.:640; I.V.:2504.5; IV Piggyback:1009.7]  Out: -   No intake/output data recorded. Lab Results   Component Value Date    WBC 6.34 07/29/2023    HGB 13.7 07/29/2023    HCT 41.9 07/29/2023    MCV 90 07/29/2023     07/29/2023       Lab Results   Component Value Date    CALCIUM 9.5 07/29/2023    K 4.0 07/29/2023    CO2 26 07/29/2023     07/29/2023    BUN 10 07/29/2023    CREATININE 0.81 07/29/2023       Physical Exam  Vitals reviewed. Constitutional:       General: She is not in acute distress. HENT:      Head: Normocephalic and atraumatic. Eyes:      Conjunctiva/sclera: Conjunctivae normal.   Cardiovascular:      Rate and Rhythm: Normal rate. Pulses: Normal pulses. Pulmonary:      Effort: Pulmonary effort is normal. No respiratory distress. Abdominal:      General: There is no distension. Palpations: Abdomen is soft. Tenderness: There is no abdominal tenderness. Musculoskeletal:         General: Normal range of motion. Skin:     General: Skin is warm and dry. Neurological:      General: No focal deficit present. Mental Status: She is alert. Motor: No weakness.    Psychiatric:         Mood and Affect: Mood normal.         Judgment: Judgment normal.           Maurilio Gentile DO  8/3/2023  5:48 AM

## 2023-08-03 NOTE — PLAN OF CARE
Problem: DISCHARGE PLANNING  Goal: Discharge to home or other facility with appropriate resources  Description: INTERVENTIONS:  - Identify barriers to discharge w/patient and caregiver  - Arrange for needed discharge resources and transportation as appropriate  - Identify discharge learning needs (management of side effects of chemotherapy, when to call the hem/onc triage)  - Refer to Case Management Department for coordinating discharge planning if the patient needs post-hospital services based on physician/advanced practitioner order or complex needs related to functional status, cognitive ability, or social support system  Outcome: Progressing     Problem: Knowledge Deficit  Goal: Patient/family/caregiver demonstrates understanding of disease process, treatment plan, medications, and discharge instructions  Description: Complete learning assessment and assess knowledge base.   Interventions:  - Provide teaching at level of understanding  - Provide teaching via preferred learning methods  Outcome: Progressing     Problem: RESPIRATORY - ADULT  Goal: Achieves optimal ventilation and oxygenation  Description: INTERVENTIONS:  - Assess for changes in respiratory status  - Assess for changes in mentation and behavior  - Position to facilitate oxygenation and minimize respiratory effort  - Oxygen administered by appropriate delivery if ordered  - Encourage broncho-pulmonary hygiene including cough, deep breathe, Incentive Spirometry  - Assess the need for suctioning and aspirate as needed  - Assess and instruct to report SOB or any respiratory difficulty  - Respiratory Therapy support as indicated  Outcome: Progressing     Problem: GENITOURINARY - ADULT  Goal: Maintains or returns to baseline urinary function  Description: INTERVENTIONS:  - Assess urinary function  - Encourage oral fluids to ensure adequate hydration if ordered  - Administer IV fluids as ordered to ensure adequate hydration  - Administer ordered medications as needed  - Offer frequent toileting  - Follow urinary retention protocol if ordered  Outcome: Progressing     Problem: COPING  Goal: Pt/Family able to verbalize concerns and demonstrate effective coping strategies  Description: INTERVENTIONS:  - Assist patient/family to identify coping skills, available support systems and cultural and spiritual values  - Provide emotional support, including active listening and acknowledgement of concerns of patient and caregivers  - Reduce environmental stimuli, as able  - Provide patient education  - Assess for spiritual pain/suffering and initiate spiritual care, including notification of Pastoral Care or jacqueline based community as needed  - Assess effectiveness of coping strategies  Outcome: Progressing

## 2023-08-04 VITALS
OXYGEN SATURATION: 98 % | TEMPERATURE: 97.3 F | HEIGHT: 67 IN | RESPIRATION RATE: 18 BRPM | HEART RATE: 72 BPM | SYSTOLIC BLOOD PRESSURE: 153 MMHG | WEIGHT: 183.86 LBS | BODY MASS INDEX: 28.86 KG/M2 | DIASTOLIC BLOOD PRESSURE: 96 MMHG

## 2023-08-04 PROCEDURE — 99238 HOSP IP/OBS DSCHRG MGMT 30/<: CPT | Performed by: OBSTETRICS & GYNECOLOGY

## 2023-08-04 PROCEDURE — 99024 POSTOP FOLLOW-UP VISIT: CPT | Performed by: OBSTETRICS & GYNECOLOGY

## 2023-08-04 PROCEDURE — 3E03305 INTRODUCTION OF OTHER ANTINEOPLASTIC INTO PERIPHERAL VEIN, PERCUTANEOUS APPROACH: ICD-10-PCS | Performed by: OBSTETRICS & GYNECOLOGY

## 2023-08-04 RX ORDER — SODIUM CHLORIDE 9 MG/ML
20 INJECTION, SOLUTION INTRAVENOUS ONCE
Status: COMPLETED | OUTPATIENT
Start: 2023-08-04 | End: 2023-08-04

## 2023-08-04 RX ORDER — ONDANSETRON 8 MG/1
8 TABLET, ORALLY DISINTEGRATING ORAL EVERY 8 HOURS PRN
Qty: 20 TABLET | Refills: 0 | Status: SHIPPED | OUTPATIENT
Start: 2023-08-04

## 2023-08-04 RX ORDER — DOCUSATE SODIUM 100 MG/1
100 CAPSULE, LIQUID FILLED ORAL 2 TIMES DAILY
Qty: 60 CAPSULE | Refills: 0 | Status: SHIPPED | OUTPATIENT
Start: 2023-08-04

## 2023-08-04 RX ADMIN — SODIUM CHLORIDE 194 MG: 0.9 INJECTION, SOLUTION INTRAVENOUS at 10:39

## 2023-08-04 RX ADMIN — SODIUM CHLORIDE 20 ML/HR: 0.9 INJECTION, SOLUTION INTRAVENOUS at 09:46

## 2023-08-04 RX ADMIN — ONDANSETRON: 2 INJECTION INTRAMUSCULAR; INTRAVENOUS at 09:40

## 2023-08-04 RX ADMIN — SODIUM CHLORIDE 500 ML: 0.9 INJECTION, SOLUTION INTRAVENOUS at 09:08

## 2023-08-04 RX ADMIN — CISPLATIN 38.8 MG: 50 INJECTION, SOLUTION INTRAVENOUS at 11:53

## 2023-08-04 RX ADMIN — SODIUM CHLORIDE 500 ML: 0.9 INJECTION, SOLUTION INTRAVENOUS at 12:45

## 2023-08-04 NOTE — PLAN OF CARE
Problem: GASTROINTESTINAL - ADULT  Goal: Minimal or absence of nausea and/or vomiting  Description: INTERVENTIONS:  - Administer IV fluids if ordered to ensure adequate hydration  - Maintain NPO status until nausea and vomiting are resolved  - Nasogastric tube if ordered  - Administer ordered antiemetic medications as needed  - Provide nonpharmacologic comfort measures as appropriate  - Advance diet as tolerated, if ordered  - Consider nutrition services referral to assist patient with adequate nutrition and appropriate food choices  Outcome: Progressing     Problem: METABOLIC, FLUID AND ELECTROLYTES - ADULT  Goal: Electrolytes maintained within normal limits  Description: INTERVENTIONS:  - Monitor labs and assess patient for signs and symptoms of electrolyte imbalances  - Administer electrolyte replacement as ordered  - Monitor response to electrolyte replacements, including repeat lab results as appropriate  - Instruct patient on fluid and nutrition as appropriate  Outcome: Progressing  Goal: Fluid balance maintained  Description: INTERVENTIONS:  - Monitor labs   - Monitor I/O and WT  - Instruct patient on fluid and nutrition as appropriate  - Assess for signs & symptoms of volume excess or deficit  Outcome: Progressing     Problem: COPING  Goal: Pt/Family able to verbalize concerns and demonstrate effective coping strategies  Description: INTERVENTIONS:  - Assist patient/family to identify coping skills, available support systems and cultural and spiritual values  - Provide emotional support, including active listening and acknowledgement of concerns of patient and caregivers  - Reduce environmental stimuli, as able  - Provide patient education  - Assess for spiritual pain/suffering and initiate spiritual care, including notification of Pastoral Care or jacqueline based community as needed  - Assess effectiveness of coping strategies  Outcome: Progressing     Problem: DISCHARGE PLANNING  Goal: Discharge to home or other facility with appropriate resources  Description: INTERVENTIONS:  - Identify barriers to discharge w/patient and caregiver  - Arrange for needed discharge resources and transportation as appropriate  - Identify discharge learning needs (management of side effects of chemotherapy, when to call the hem/onc triage)  - Refer to Case Management Department for coordinating discharge planning if the patient needs post-hospital services based on physician/advanced practitioner order or complex needs related to functional status, cognitive ability, or social support system  Outcome: Progressing

## 2023-08-04 NOTE — PLAN OF CARE
Problem: DISCHARGE PLANNING  Goal: Discharge to home or other facility with appropriate resources  Description: INTERVENTIONS:  - Identify barriers to discharge w/patient and caregiver  - Arrange for needed discharge resources and transportation as appropriate  - Identify discharge learning needs (management of side effects of chemotherapy, when to call the hem/onc triage)  - Refer to Case Management Department for coordinating discharge planning if the patient needs post-hospital services based on physician/advanced practitioner order or complex needs related to functional status, cognitive ability, or social support system  Outcome: Progressing     Problem: Knowledge Deficit  Goal: Patient/family/caregiver demonstrates understanding of disease process, treatment plan, medications, and discharge instructions  Description: Complete learning assessment and assess knowledge base.   Interventions:  - Provide teaching at level of understanding  - Provide teaching via preferred learning methods  Outcome: Progressing     Problem: RESPIRATORY - ADULT  Goal: Achieves optimal ventilation and oxygenation  Description: INTERVENTIONS:  - Assess for changes in respiratory status  - Assess for changes in mentation and behavior  - Position to facilitate oxygenation and minimize respiratory effort  - Oxygen administered by appropriate delivery if ordered  - Encourage broncho-pulmonary hygiene including cough, deep breathe, Incentive Spirometry  - Assess the need for suctioning and aspirate as needed  - Assess and instruct to report SOB or any respiratory difficulty  - Respiratory Therapy support as indicated  Outcome: Progressing     Problem: GASTROINTESTINAL - ADULT  Goal: Minimal or absence of nausea and/or vomiting  Description: INTERVENTIONS:  - Administer IV fluids if ordered to ensure adequate hydration  - Maintain NPO status until nausea and vomiting are resolved  - Nasogastric tube if ordered  - Administer ordered antiemetic medications as needed  - Provide nonpharmacologic comfort measures as appropriate  - Advance diet as tolerated, if ordered  - Consider nutrition services referral to assist patient with adequate nutrition and appropriate food choices  Outcome: Progressing  Goal: Maintains adequate nutritional intake  Description: INTERVENTIONS:  - Monitor percentage of each meal consumed  - Identify factors contributing to decreased intake, treat as appropriate  - Assist with meals as needed  - Monitor I&O, weight, and lab values if indicated  - Obtain nutrition services referral as needed  Outcome: Progressing     Problem: GENITOURINARY - ADULT  Goal: Maintains or returns to baseline urinary function  Description: INTERVENTIONS:  - Assess urinary function  - Encourage oral fluids to ensure adequate hydration if ordered  - Administer IV fluids as ordered to ensure adequate hydration  - Administer ordered medications as needed  - Offer frequent toileting  - Follow urinary retention protocol if ordered  Outcome: Progressing     Problem: HEMATOLOGIC - ADULT  Goal: Maintains hematologic stability  Description: INTERVENTIONS  - Assess for signs and symptoms of bleeding or hemorrhage  - Monitor labs  - Administer supportive blood products/factors as ordered and appropriate  Outcome: Progressing     Problem: COPING  Goal: Pt/Family able to verbalize concerns and demonstrate effective coping strategies  Description: INTERVENTIONS:  - Assist patient/family to identify coping skills, available support systems and cultural and spiritual values  - Provide emotional support, including active listening and acknowledgement of concerns of patient and caregivers  - Reduce environmental stimuli, as able  - Provide patient education  - Assess for spiritual pain/suffering and initiate spiritual care, including notification of Pastoral Care or jacqueline based community as needed  - Assess effectiveness of coping strategies  Outcome: Progressing

## 2023-08-04 NOTE — PROGRESS NOTES
For questions/concerns on this patient, please reach out to the following:  [facility]   Gyn Oncology Progress note   Kylee Johns 27 y.o. female MRN: [id number]  Unit/Bed#: E2 -01 Encounter: [id number]    Assessment/Plan:    27 y. o.  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 There was no sign of any other tumor in the abdomen or other biopsies. Chest x-ray was negative    Malignant neoplasm of ovary [facility])  Assessment & Plan  Plan for Bleomycin 30 mg IV day 1 8 and 15 of a 21-day cycle, etoposide 100 mg per metered squared days 1 through 5 and Cisplatin 20 mg per metered squared days 1 through 5. Plan for 4 cycles   S/p PFT on 7/27: DLCO corrected for patients hemoglobin level: 109 %, Normal spirometry  Lines:  Right internal jugular approach single lumen power port   FEN: regular diet  DVT PPX: Ambulation, SCDs  Meds ordered PRN for any chemotherapy-related symptoms         Component Ref Range & Units 7/29/23 0759   AFP TUMOR MARKER 0.00 - 9.00 ng/mL 303.60 High          Component Ref Range & Units 7/29/23 0759 6/2/23 0954    0.0 - 35.0 U/mL 25.4  214.4 High  CM             Subjective:    Jennifer P Walker has no current complaints this morning. Does have occasional nausea, sometimes overnight but usually resolves with crackers and ginger ale. Does not really like the food here. Reports some mild constipation and has not required any antiemetic medications. Sleeping well and ambulating without difficulty.       Objective:  /90 (BP Location: Left arm)   Pulse 66   Temp (!) 97.4 °F (36.3 °C) (Axillary)   Resp 16   Ht 5' 7" (1.702 m)   Wt 83.4 kg (183 lb 13.8 oz)   LMP 05/15/2023 (Exact Date)   SpO2 99%   BMI 28.80 kg/m²     I/O last 3 completed shifts: In: 3524.5 [P.O.:720; I.V.:2804.5]  Out: -   No intake/output data recorded. Lab Results   Component Value Date    WBC 10.74 (H) 08/03/2023    HGB 11.7 08/03/2023    HCT 37.3 08/03/2023    MCV 91 08/03/2023     08/03/2023       Lab Results   Component Value Date    CALCIUM 9.0 08/03/2023    K 3.8 08/03/2023    CO2 28 08/03/2023     08/03/2023    BUN 9 08/03/2023    CREATININE 0.61 08/03/2023       Physical Exam  Vitals reviewed. Constitutional:       General: She is not in acute distress. HENT:      Head: Normocephalic and atraumatic. Eyes:      Conjunctiva/sclera: Conjunctivae normal.   Cardiovascular:      Rate and Rhythm: Normal rate. Pulses: Normal pulses. Pulmonary:      Effort: Pulmonary effort is normal. No respiratory distress. Musculoskeletal:         General: Normal range of motion. Skin:     General: Skin is warm and dry. Neurological:      General: No focal deficit present. Mental Status: She is alert. Motor: No weakness.    Psychiatric:         Mood and Affect: Mood normal.         Judgment: Judgment normal.           Aaron Jj MD  8/4/2023  6:57 AM

## 2023-08-07 ENCOUNTER — HOSPITAL ENCOUNTER (OUTPATIENT)
Dept: INFUSION CENTER | Facility: HOSPITAL | Age: 33
Discharge: HOME | End: 2023-08-07
Payer: COMMERCIAL

## 2023-08-07 VITALS
RESPIRATION RATE: 17 BRPM | BODY MASS INDEX: 27.96 KG/M2 | WEIGHT: 178.13 LBS | OXYGEN SATURATION: 98 % | HEART RATE: 98 BPM | HEIGHT: 67 IN | SYSTOLIC BLOOD PRESSURE: 137 MMHG | TEMPERATURE: 98.3 F | DIASTOLIC BLOOD PRESSURE: 85 MMHG

## 2023-08-07 DIAGNOSIS — C56.9 MALIGNANT NEOPLASM OF OVARY, UNSPECIFIED LATERALITY (HCC): ICD-10-CM

## 2023-08-07 DIAGNOSIS — D70.1 CHEMOTHERAPY INDUCED NEUTROPENIA (HCC): Primary | ICD-10-CM

## 2023-08-07 DIAGNOSIS — C56.2 MALIGNANT NEOPLASM OF LEFT OVARY (HCC): ICD-10-CM

## 2023-08-07 DIAGNOSIS — Z51.11 ADMISSION FOR CHEMOTHERAPY: ICD-10-CM

## 2023-08-07 DIAGNOSIS — T45.1X5A CHEMOTHERAPY INDUCED NEUTROPENIA (HCC): Primary | ICD-10-CM

## 2023-08-07 RX ORDER — SODIUM CHLORIDE 9 MG/ML
20 INJECTION, SOLUTION INTRAVENOUS ONCE
Status: COMPLETED | OUTPATIENT
Start: 2023-08-07 | End: 2023-08-07

## 2023-08-07 RX ORDER — ACETAMINOPHEN 325 MG/1
650 TABLET ORAL ONCE
Status: COMPLETED | OUTPATIENT
Start: 2023-08-07 | End: 2023-08-07

## 2023-08-07 RX ADMIN — BLEOMYCIN SULFATE 30 UNITS: 30 POWDER, FOR SOLUTION INTRAMUSCULAR; INTRAPLEURAL; INTRAVENOUS; SUBCUTANEOUS at 14:38

## 2023-08-07 RX ADMIN — SODIUM CHLORIDE 20 ML/HR: 0.9 INJECTION, SOLUTION INTRAVENOUS at 12:55

## 2023-08-07 RX ADMIN — DIPHENHYDRAMINE HYDROCHLORIDE 25 MG: 50 INJECTION, SOLUTION INTRAMUSCULAR; INTRAVENOUS at 13:42

## 2023-08-07 RX ADMIN — PEGFILGRASTIM 6 MG: KIT SUBCUTANEOUS at 15:21

## 2023-08-07 RX ADMIN — ACETAMINOPHEN 650 MG: 325 TABLET, FILM COATED ORAL at 13:38

## 2023-08-07 NOTE — PROGRESS NOTES
Pt tolerated treatment well. Reviewed side effect management and neulasta onpro. Verbalized understanding of same.

## 2023-08-07 NOTE — UTILIZATION REVIEW
NOTIFICATION OF ADMISSION DISCHARGE   This is a Notification of Discharge from [facility]. Please be advised that this patient has been discharge from our facility. Below you will find the admission and discharge date and time including the patient’s disposition. UTILIZATION REVIEW CONTACT:  Romayne Rayas, MA  Utilization   Network Utilization Review Department  Phone: [phone] x carefully listen to the prompts. All voicemails are confidential.  Email: [email or web address]     ADMISSION INFORMATION  PRESENTATION DATE: 7/31/2023  8:13 AM  OBERVATION ADMISSION DATE:   INPATIENT ADMISSION DATE: 7/31/23  8:13 AM   DISCHARGE DATE: 8/4/2023  3:30 PM   DISPOSITION:Home/Self Care    IMPORTANT INFORMATION:  Send all requests for admission clinical reviews, approved or denied determinations and any other requests to dedicated fax number below belonging to the campus where the patient is receiving treatment.  List of dedicated fax numbers:  [place] DENIALS (Administrative/Medical Necessity) [phone] [address] [facility] (Maternity/NICU/Pediatrics) [phone]   [facility] [phone]   [facility] [phone]   [address] [facility] [phone]   [address] [place] [address] [phone]   [facility] [phone]   [address] [place] [address] [address] [facility] [phone]   [address] [place] [address] [phone] [address] [facility] [phone] 2720 [facility] [address] [facility] [phone]

## 2023-08-08 ENCOUNTER — TELEPHONE (OUTPATIENT)
Dept: HEMATOLOGY ONCOLOGY | Facility: CLINIC | Age: 33
End: 2023-08-08

## 2023-08-08 NOTE — TELEPHONE ENCOUNTER
Patient Call    Who are you speaking with? Patient    If it is not the patient, are they listed on an active communication consent form? N/A   What is the reason for this call? Pt asked for her fmla forms to be faxed to [place]   Does this require a call back? N/A   If a call back is required, please list best call back number n/a   If a call back is required, advise that a message will be forwarded to their care team and someone will return their call as soon as possible. Did you relay this information to the patient?  N/A SURGERY VISIT [facility]  5/24/2021    REASON FOR VISIT:  left breast cancer    LAST VISIT WITH ME:  Visit date not found    HISTORY OF PRESENT ILLNESS:  Ms. Schwartz is a 58 year old female who I am seeing at the request of Pia Cool DO  for an abnormality of the left breast.  The patient does not feel a mass.  The patient's family history for breast cancer is positive in a paternal grandmother. The patients mother had breast cancer at age 55.  The patient denies any trauma to the breast.  The patient has not had a breast biopsy previously.  The patient is currently not taking any estrogens.  Age of menarche 10. Number of children 1. Age at first childbirth 26. Menopause 48  PAST MEDICAL HISTORY:  (Reviewed and updated in the History Section of the Epic chart)    Pulmonary HTN (CMS/HCC)                                         Comment: from Dr. Teresa dx: 8 years ago.  2./3/15ECHO:                EF 65%, DD, RVS F 60% PASP 30 mmHg     H/O renal calculi                                               Comment: 2016 last episode    Irregular Z line of esophagus                   8/29/2016     Hypothyroidism                                  8/9/2014      HTN (hypertension)                              8/9/2014      Hiatal hernia                                   8/29/2016     Fibromyalgia                                    04/11/2016      Comment:  DR. Ambrose  for Fibromyalgia  On Cymbalta.     COPD bronchitis                                 11/7/2014     Benign neoplasm of cecum                        8/29/2016     Asthma                                          8/9/2014      Allergy                                         8/9/2014      Sleep apnea on CPAP                             01/05/2017      Comment: CPAP nightly    Fracture                                                        Comment: Unknown to pt - sometime ago she has a rib                fracture and left fib fracture    Kidney stone                                     2014            Comment: Passed     PAST SURGICAL HISTORY:  (Reviewed and updated in the History Section of the Epic chart)    LAPAROSCOPY UNLISTED PROC                       1995            Comment: for endometriosis, dx:     COLONOSCOPY                                     2016          KNEE ARTHROSCOPY W/ DEBRIDEMENT                 04/19/2018      Comment: by Dr. Becerril     APPENDECTOMY                                    04/09/2017      Comment: by Dr. Winters    LUMBAR FUSION                                   10/02/2018      Comment: LUMBAR 4 - SACRAL 1 FUSION WITH DECOMPRESSION                 (Dr. Boris Manzano, [facility])    CHOLECYSTECTOMY                                 2001          MEDICATIONS:  Current Outpatient Medications   Medication Sig Dispense Refill   • spironolactone (ALDACTONE) 25 MG tablet TAKE 1 TABLET BY MOUTH DAILY 90 tablet 1   • acetaminophen-codeine (TYLENOL NO.3) 300-30 MG per tablet Take 1-2 tablets by mouth every 4 hours as needed for Pain. 20 tablet 0   • meloxicam (MOBIC) 15 MG tablet Take 1 tablet by mouth daily. 90 tablet 3   • DULoxetine (CYMBALTA) 60 MG capsule Take 2 capsules by mouth nightly. Takes two capsules nightly 180 capsule 3   • losartan (COZAAR) 100 MG tablet Take 1 tablet by mouth daily. 90 tablet 2   • levothyroxine 137 MCG tablet Take 1 tablet by mouth daily. 90 tablet 2   • Lidocaine 5 % Cream Apply 5 g topically 4 times daily as needed (pain. Max 20 g/day). 113 g 3   • azelastine (ASTELIN) 0.1 % nasal spray Spray 1 spray in each nostril 2 times daily.      • topiramate (TOPAMAX) 100 MG tablet Take 1 tablet by mouth 2 times daily. 180 tablet 3   • amitriptyline (ELAVIL) 25 MG tablet Take 1 tablet by mouth nightly. 90 tablet 4   • SPIRIVA HANDIHALER 18 MCG capsule for inhaler INHALE 1 CAPSULE VIA HANDIHALER ONCE DAILY 30 capsule 11   • rOPINIRole (REQUIP) 0.5 MG tablet TAKE 1 TABLET BY MOUTH THREE TIMES DAILY (Patient taking differently: Takes 1  mg in AM; 0.50 mg in afternoon; and 2 mg at night) 60 tablet 0   • fluticasone-salmeterol (ADVAIR DISKUS) 500-50 MCG/DOSE inhaler Inhale 1 puff into the lungs two times daily.     • albuterol-ipratropium 2.5 mg/0.5 mg (DUONEB) 0.5-2.5 (3) MG/3ML nebulizer solution USE 3 ML VIA NEBULIZER EVERY 6 HOURS AS NEEDED FOR WHEEZING 360 mL 0   • omeprazole-sodium bicarbonate (ZEGERID)  MG per capsule TAKE 1 CAPSULE BY MOUTH DAILY (Patient taking differently: Take 1 capsule by mouth daily. And may take in evening if needed) 30 capsule 0   • CRANBERRY PO Take by mouth daily.      • MAGNESIUM PO Take 250 mg by mouth.     • Cholecalciferol (D-3-5 PO) Take 2,000 Units by mouth.     • cetirizine (ZYRTEC) 10 MG tablet Take 10 mg by mouth daily.     • diazePAM (VALIUM) 5 MG tablet Take 1 tablet by mouth every 12 hours as needed for Anxiety or Muscle spasms. 14 tablet 1     No current facility-administered medications for this visit.       ALLERGIES:   Allergen Reactions   • Almonds [[place]] GI UPSET   • Dairy Digestive   (Food Or Med) GI UPSET   • Singulair PRURITUS   • Tape [Adhesive   (Environmental)] RASH       Family History   Problem Relation Age of Onset   • Cancer, Ovarian Mother    • Osteoarthritis Mother    • Cancer Father         unsure   • High blood pressure Father    • Cancer, Skin Sister    • Hypertension Sister    • Dementia/Alzheimers Sister    • Cancer, Skin Sister    • Dementia/Alzheimers Sister    • Cancer, Skin Sister    • Dementia/Alzheimers Sister    • Asthma Brother        Social History     Socioeconomic History   • Marital status: Single     Spouse name: Not on file   • Number of children: Not on file   • Years of education: Not on file   • Highest education level: Not on file   Occupational History   • Not on file   Tobacco Use   • Smoking status: Former Smoker     Packs/day: 0.00     Years: 15.00     Pack years: 0.00     Quit date:      Years since quittin.4   • Smokeless tobacco: Never Used    Substance and Sexual Activity   • Alcohol use: Yes     Alcohol/week: 4.0 - 5.0 standard drinks     Types: 4 - 5 Cans of beer per week     Comment: Weekly   • Drug use: No   • Sexual activity: Yes   Other Topics Concern   • Not on file   Social History Narrative    Social History     Single.   No children Lives in [place] with a boyfriend..  She is retired.  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    Never Smoker    Alcohol Use Yes, (occ.)    Drug Use No    In process of Applying for Disability     One child -Son.                                      Social Determinants of Health     Financial Resource Strain:    • Social Determinants: Financial Resource Strain:    Food Insecurity:    • Social Determinants: Food Insecurity:    Transportation Needs:    • Lack of Transportation (Medical):    • Lack of Transportation (Non-Medical):    Physical Activity:    • Days of Exercise per Week:    • Minutes of Exercise per Session:    Stress:    • Social Determinants: Stress:    Social Connections:    • Social Determinants: Social Connections:    Intimate Partner Violence: Not At Risk   • Social Determinants: Intimate Partner Violence Past Fear: No   • Social Determinants: Intimate Partner Violence Current Fear: No         REVIEW OF SYSTEMS:  CONSTITUTIONAL:  Denies fever, fatigue.  CARDIOVASCULAR:  Denies chest pain or palpitations.  RESPIRATORY:  Denies trouble breathing (shortness of breath).       PHYSICAL EXAM:  VITAL SIGNS:   Visit Vitals  /62 (BP Location: RUE - Right upper extremity, Patient Position: Sitting, Cuff Size: Large Adult)   Pulse 94   Temp 98.9 °F (37.2 °C) (Oral)   Resp 16   Wt 93.2 kg   LMP 05/01/2015   SpO2 96%   BMI 36.40 kg/m²     GENERAL APPEARANCE:  Cooperative, not in acute distress.  SKIN:  No rashes or nodules.  NECK:  Full range of motion, no thyromegaly.  LYMPHATICS:  No cervical or supraclavicular lymphadenopathy.  CHEST AND LUNGS:  No use of accessory muscles in  breathing  BREASTS:  The patient was examined with my nurse present.  The breasts are symmetrical in size and shape.  The left breast is without skin or nipple changes except minor bruising from biopsy. There are no dominant masses. There are no axillary masses.  The right breast is without skin or nipple changes There are no dominant masses. There are no axillary masses.     CARDIOVASCULAR:  Regular rhythm, no jugular vein distension.  PSYCHIATRIC:  Oriented to time, person, and place; normal mood and affect.  MUSCULOSKELETAL:  No clubbing, cyanosis.    ANCILLARY STUDIES  MAMMO FINDINGS: Persistence of a high density irregular 8mm mass in the  approximate 9:00 position of the right breast at mid to posterior depth.     LEFT BREAST ULTRASOUND: Ultrasound was performed both by the technologist  and the interpreting radiologist.     Subtle irregular hypoechoic mass at 9:30, 5 cm from the nipple with  indistinct margins. Subtle shadowing seen on real-time. This measures  approximately 6 x 6 x 4 mm. This is felt to correspond to the mammographic  finding.     BILATERAL AXILLARY ULTRASOUND: Right axillary ultrasound demonstrates  morphologically normal lymph nodes.     Left axillary ultrasound demonstrates one lymph node with slightly  thickened cortex measuring up to 4 mm. The cortex is slightly asymmetric  and irregular. This is located at 1-2 o'clock, 10 cm from the nipple. The  hilum is maintained.Of note, patient did have a COVID vaccination in the  left arm.          IMPRESSION:   1. Suspicious left breast mass at 9:30, 5 cm from the nipple.  2. Left axillary adenopathy.  3. No evident right axillary adenopathy:    PATHOLOGY:  Addendum: IHC Prognostic Marker Result   BREAST BIOMARKERS  Block: A1     Estrogen Receptor: POSITIVE         Fany Score 8 (5 + 3);  100% of tumor nuclei,  strong staining        Progesterone Receptor: POSITIVE         Fany Score 8 (5 + 3);  100% of tumor nuclei,  strong staining             HER2 by IHC: NEGATIVE (0)   INVASIVE CARCINOMA OF THE BREAST: Biopsy     SITE: Left breast mass, 9-10 o'clock, 5 cm from nipple     INVASIVE CARCINOMA  HISTOLOGIC TYPE: Invasive mammary carcinoma with mucinous features  HISTOLOGIC GRADE: Well-differentiated                 Tubular Differentiation:       3/3                 Nuclear Pleomorphism:      1/3                 Mitotic Rate:                        1/3                 Jamal Score:              5/9     GREATEST EXTENT OF INVASIVE CARCINOMA (in any single core): 0.4 cm     LYMPHOVASCULAR INVASION:  Not Identified    ASSESSMENT:   Ms. Schwartz is a 58 year old female with a clinical Stage 1 left breast cancer  PLAN:   Patient would be an excellent candidate for breast conservation.  She is aware that a segmental mastectomy can result in positive margins that would require re-excision.  She is aware the risks of cosmetic deformity, scarring, bleeding, infection.  The patient is aware that sometimes radiation treatment is needed after partial mastectomy.    There will need to be placement of a radiofrequency clip or a wire for localization of the area to be removed. There will also need to be injection of nuclear medicine material for sentinel node detection.     A sentinel node biopsy was explained.  The patient is aware of the risks of lymphedema.  The lymph nodes are sampled to obtain adequate staging.    The patient is aware that she will see Radiation Oncology and Medical Oncology after surgery.      I will ask Dr. Cool to provide medical clearance for surgery.    The patient will have a general anesthetic.       A total of 45 minutes was spent with the patient, with greater than 50% of the time spent with counseling and coordination of care.

## 2023-08-10 ENCOUNTER — APPOINTMENT (OUTPATIENT)
Dept: LAB | Facility: HOSPITAL | Age: 33
End: 2023-08-10
Payer: COMMERCIAL

## 2023-08-11 ENCOUNTER — TELEPHONE (OUTPATIENT)
Dept: GYNECOLOGIC ONCOLOGY | Facility: CLINIC | Age: 33
End: 2023-08-11

## 2023-08-11 ENCOUNTER — TELEPHONE (OUTPATIENT)
Dept: HEMATOLOGY ONCOLOGY | Facility: CLINIC | Age: 33
End: 2023-08-11

## 2023-08-11 NOTE — TELEPHONE ENCOUNTER
Return call placed to patient. She is amenable to inpatient treatment for cycle 2, days 1-5. Orders will be converted.

## 2023-08-11 NOTE — TELEPHONE ENCOUNTER
Patient Call    Who are you speaking with? Patient    If it is not the patient, are they listed on an active communication consent form? N/A   What is the reason for this call? Patient calling to speak with Gracia Taurus regarding her infusion treatement. Does this require a call back? yes   If a call back is required, please list best call back number [phone]   If a call back is required, advise that a message will be forwarded to their care team and someone will return their call as soon as possible. Did you relay this information to the patient?  yes

## 2023-08-11 NOTE — TELEPHONE ENCOUNTER
LMOM for patient to discuss that [facility], and [facility] have no availability for her days 1-5 treatment for the next 2 weeks. Would advise she receive cycle 2 inpatient as with her 1st to avoid delay in her treatment. I scheduled cycles 3 and 4 outpatient at the [facility].

## 2023-08-14 ENCOUNTER — HOSPITAL ENCOUNTER (OUTPATIENT)
Dept: INFUSION CENTER | Facility: HOSPITAL | Age: 33
Discharge: HOME | End: 2023-08-14
Payer: COMMERCIAL

## 2023-08-14 ENCOUNTER — PATIENT OUTREACH (OUTPATIENT)
Dept: CASE MANAGEMENT | Facility: HOSPITAL | Age: 33
End: 2023-08-14

## 2023-08-14 VITALS
HEART RATE: 83 BPM | RESPIRATION RATE: 16 BRPM | BODY MASS INDEX: 27.89 KG/M2 | TEMPERATURE: 98.6 F | SYSTOLIC BLOOD PRESSURE: 127 MMHG | OXYGEN SATURATION: 98 % | DIASTOLIC BLOOD PRESSURE: 75 MMHG | HEIGHT: 67 IN

## 2023-08-14 DIAGNOSIS — D70.1 CHEMOTHERAPY INDUCED NEUTROPENIA (HCC): Primary | ICD-10-CM

## 2023-08-14 DIAGNOSIS — T45.1X5A CHEMOTHERAPY INDUCED NEUTROPENIA (HCC): Primary | ICD-10-CM

## 2023-08-14 DIAGNOSIS — C56.9 MALIGNANT NEOPLASM OF OVARY, UNSPECIFIED LATERALITY (HCC): Primary | ICD-10-CM

## 2023-08-14 DIAGNOSIS — Z51.11 ADMISSION FOR CHEMOTHERAPY: ICD-10-CM

## 2023-08-14 DIAGNOSIS — C56.9 MALIGNANT NEOPLASM OF OVARY, UNSPECIFIED LATERALITY (HCC): ICD-10-CM

## 2023-08-14 PROCEDURE — 96367 TX/PROPH/DG ADDL SEQ IV INF: CPT

## 2023-08-14 PROCEDURE — 96409 CHEMO IV PUSH SNGL DRUG: CPT

## 2023-08-14 RX ORDER — ACETAMINOPHEN 325 MG/1
650 TABLET ORAL ONCE
Status: COMPLETED | OUTPATIENT
Start: 2023-08-14 | End: 2023-08-14

## 2023-08-14 RX ORDER — SODIUM CHLORIDE 9 MG/ML
20 INJECTION, SOLUTION INTRAVENOUS ONCE
Status: COMPLETED | OUTPATIENT
Start: 2023-08-14 | End: 2023-08-14

## 2023-08-14 RX ADMIN — SODIUM CHLORIDE 30 UNITS: 9 INJECTION, SOLUTION INTRAVENOUS at 14:54

## 2023-08-14 RX ADMIN — DIPHENHYDRAMINE HYDROCHLORIDE 25 MG: 50 INJECTION, SOLUTION INTRAMUSCULAR; INTRAVENOUS at 13:52

## 2023-08-14 RX ADMIN — SODIUM CHLORIDE 20 ML/HR: 0.9 INJECTION, SOLUTION INTRAVENOUS at 13:52

## 2023-08-14 RX ADMIN — ACETAMINOPHEN 650 MG: 325 TABLET, FILM COATED ORAL at 13:52

## 2023-08-14 NOTE — PROGRESS NOTES
Biopsychosocial and Barriers Assessment    Cancer Diagnosis: ovarian  Home/Cell Phone: [phone]  Emergency Contact: Bia Ceja Candie [facility]) [phone]   Marital Status: D  Interpretation concerns, speaks another language, preferred language:  Cultural concerns: no  Ability to read or write: yes    Caregiver/Support: friend, Taqueria Guerrero  Children: no  Child/Elder care: no    Housing: no issues, rents  Home Setup: mobile  Lives With: alone  Daily Living Activities: independent  Durable Medical Equipment: no  Ambulation: independent    Preferred Pharmacy: [facility]  High co-pays with insurance:   High co-pays with medication coverage:  No medication coverage: [employer]    Primary Care Provider: [facility]  Hx of Home Health Care: no  Hx of Short term rehab: no  Mental Health Hx: no  Substance Abuse Hx: no  Employment: full time, currently on [employer] Status/Location: no  Ability to pay bills: no  POA/LW/AD: no  Transportation Plan/Concerns: friends help      What do you know about your Cancer Diagnosis    What has your doctor told you about your cancer diagnosis: ovarian, had hysterectomy    What has your doctor told you about your cancer treatment: chemo has started    What specific concerns do you have about your diagnosis and treatment:  How long she will need    Have you been made aware of any hair loss associated with treatment: yes, may be interested in a wig    Additional Comments:    SERGEI met with patient and her friend Taqueria Guerrero at the infusion center this afternoon. LSW introduced self and OSW role. Patient tells me she had a full hysterectomy in June of this year. She  scheduled to start with chemo infusion 7/31. She is here at [address] [place] [address] this afternoon for treatment. Patient lives alone. She is independent. 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 She is currently on FMLA, then STD and is eligible for LTD.  She has lost 40% of her pay during this time between surgery and daily infusion. She struggles with paying her rent and utilities. LSW explained available resources. Patient is also considering some type of therapy to deal with her hormonal change and effects of her hysterectomy at a young age. LSW provided pt with the [facility] August Calendar and info. Pt and her friend are interested in taking a trip to the [facility] to see everything they offer and join a support group. LSW also offered the psychology today website to find a local therapist. Patient has a large group of friends. 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 Pt's friends take turns providing her transportation and sitting with her at infusion, etc.    Patient isn't sure if she is behind on any bills but will take a look. She is worried about her rent this month because of the loss of income since June. Pt scored a 2 on the DT. She said some days are worse than others. Today is a good day. Pt says a few days ago, she cried all day. Pt's friend is at her side. Pt's mother is also local and has tried to help pt with a [employer] page. LSW provided pt with my name, phone number and email address if she has any questions or concerns. Patient is very appreciative. Emotional support was provided. LSW will follow up with pt as needed.

## 2023-08-16 ENCOUNTER — TELEMEDICINE (OUTPATIENT)
Dept: GYNECOLOGIC ONCOLOGY | Facility: CLINIC | Age: 33
End: 2023-08-16

## 2023-08-16 ENCOUNTER — TELEPHONE (OUTPATIENT)
Dept: GYNECOLOGIC ONCOLOGY | Facility: CLINIC | Age: 33
End: 2023-08-16

## 2023-08-16 DIAGNOSIS — C56.9 MALIGNANT NEOPLASM OF OVARY, UNSPECIFIED LATERALITY (HCC): Primary | ICD-10-CM

## 2023-08-16 NOTE — PROGRESS NOTES
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 She is s/p one cycle of adjuvant chemotherapy with Cisplatin 20 mg/m2 on days 1-5 + etoposide 100mg/m2 on days 1-5 and bleomycin 30 mg IV on days 1, 8, and 15 of a 21 day cycle. She received days 1-5 inpatient and days 8 and 15 outpatient. Overall, she tolerated treatment well. She had some mild nausea at home and 1 episode of vomiting this past Monday night (day 15). She is fatigued and feels achy. Her PS is 0. Patient will proceed with cycle 2 as planned, pending labs. Days 1-5 again planned for inpatient administration (in \[facility]\"" due to staffing at [address] [place] [address]). She will return to the office as per her chemotherapy calendar. Reason for visit is pre-chemo eval     Encounter provider ELIF Vines    Provider located at [address] [place] [address] [place] Severance [address] [address] [phone]      Recent Visits  Date Type Provider Dept   08/11/23 Telephone Inderjit Durant, [address] [facility] [address] recent visits within past 7 days and meeting all other requirements  Today's Visits  Date Type Provider Dept   08/16/23 Telemedicine Inderjit Durant, [address] [facility] today's visits and meeting all other requirements  Future Appointments  No visits were found meeting these conditions. Showing future appointments within next 150 days and meeting all other requirements       The patient was identified by name and date of birth.  Rima Pierce was informed that this is a telemedicine visit and that the visit is being conducted through the [employer]. She agrees to proceed. My office door was closed. No one else was in the room. She acknowledged consent and understanding of privacy and security of the video platform. The patient has agreed to participate and understands they can discontinue the visit at any time. Patient is aware this is a billable service. Ceasar Porras is a 27 y.o. female       This is a 27 y.o. female who presents to the office for pre-chemotherapy evaluation. She has been tolerating chemotherapy without significant difficulty. She has been afebrile, although notes that she feels "freezing" at times. She was generally without nausea until earlier this week, when she had a single episode of vomiting after eating food from [employer]. Her appetite is appropriate. She is voiding and moving her bowels without difficulty. She denies abdominal or pelvic pain. The patient is without vaginal bleeding or discharge. No numbness or tingling. She is ambulatory.          Past Medical History:   Diagnosis Date   • No known health problems    • PONV (postoperative nausea and vomiting)        Past Surgical History:   Procedure Laterality Date   • HYSTERECTOMY N/A 6/13/2023    Procedure: OCHOA/BSO, STAGING radical omentectomy bilateral pelvic lymph node dissection;  Surgeon: Kinjal Sosa MD;  [facility];  Service: Gynecology Oncology   • IR PORT PLACEMENT  7/24/2023   • ORIF TIBIA & FIBULA FRACTURES Right    • OH EXPLORATORY LAPAROTOMY CELIOTOMY W/WO BIOPSY SPX N/A 6/13/2023    Procedure: EX LAP;  Surgeon: Kinjal Sosa MD;  [facility];  Service: Gynecology Oncology   • URETERAL STENT PLACEMENT Bilateral 6/13/2023    Procedure: Marian Madsen; STENT URETERAL;  Surgeon: Bradly Michael MD;  Location[facility];  Service: Urology   • WISDOM TOOTH EXTRACTION         Current Outpatient Medications   Medication Sig Dispense Refill   • cholecalciferol (VITAMIN D3) 1,000 units tablet Take 5,000 Units by mouth daily     • docusate sodium (COLACE) 100 mg capsule Take 1 capsule (100 mg total) by mouth 2 (two) times a day 60 capsule 0   • LORazepam (ATIVAN) 1 mg tablet Take 1 tablet (1 mg total) by mouth every 6 (six) hours as needed for anxiety (or nausea) 36 tablet 1   • Multiple Vitamin (multivitamin) tablet Take 1 tablet by mouth daily     • ondansetron (ZOFRAN) 8 mg tablet Take 1 tablet (8 mg total) by mouth every 8 (eight) hours as needed for nausea or vomiting 30 tablet 1   • ondansetron (ZOFRAN-ODT) 8 mg disintegrating tablet Take 1 tablet (8 mg total) by mouth every 8 (eight) hours as needed for nausea or vomiting 20 tablet 0     No current facility-administered medications for this visit. Facility-Administered Medications Ordered in Other Visits   Medication Dose Route Frequency Provider Last Rate Last Admin   • alteplase (CATHFLO) injection 2 mg  2 mg Intracatheter Q1MIN PRN Santy Arcos MD            Allergies   Allergen Reactions   • Penicillins Itching and Rash     Rash  Rash         Oncology History   Malignant neoplasm of ovary ([address])   6/13/2023 Surgery    Patient underwent exploratory laparotomy OCHOA/BSO and staging procedure. Intra-Op findings revealed a large left adnexal mass approximately 12 to 14 cm densely adherent to the sigmoid colon. It ruptured releasing chocolate fluid into the abdomen however prior staining of the omentum indicated likely previous rupture. Final pathology report after extensive review by [facility] was most consistent with endometrioid adenocarcinoma initiating within endometriosis within the ovary. Additionally yolk sac tumor was identified which extended to the local:. Making this a stage IIB tumor. Would recommend 4 cycles of bleomycin etoposide and cis-platinum.      7/11/2023 Initial Diagnosis    Malignant neoplasm of left ovary ([address])     7/11/2023 -  Cancer Staged Staging form: Ovary, Fallopian Tube, Primary Peritoneal, AJCC 8th Edition  - Clinical stage from 7/11/2023: Stage IIB (cT2b, cN0, cM0) - Signed by Johann Johnson MD on 7/11/2023  Histopathologic type: Yolk sac tumor  Stage prefix: Initial diagnosis       7/31/2023 -  Chemotherapy    alteplase (CATHFLO), 2 mg, Intracatheter, Every 1 Minute as needed, 1 of 4 cycles  pegfilgrastim (Sherri [place]), 6 mg, Subcutaneous, Once, 1 of 4 cycles  Administration: 6 mg (8/7/2023)  bleomycin (BLENOXANE) IVPB, 30 Units, Intravenous, Once, 1 of 4 cycles  Administration: 30 Units (7/31/2023), 30 Units (8/7/2023), 30 Units (8/14/2023)  CISplatin (PLATINOL) infusion, 20 mg/m2 = 38.8 mg, Intravenous, Once, 1 of 4 cycles  Administration: 38.8 mg (7/31/2023), 38.8 mg (8/1/2023), 38.8 mg (8/2/2023), 38.8 mg (8/3/2023), 38.8 mg (8/4/2023)  fosaprepitant (EMEND) IVPB, 150 mg, Intravenous, Once, 1 of 4 cycles  Administration: 150 mg (7/31/2023)  etoposide (TOPOSAR), 100 mg/m2 = 194 mg, Intravenous, Once, 1 of 4 cycles  Administration: 194 mg (7/31/2023), 194 mg (8/1/2023), 194 mg (8/2/2023), 194 mg (8/3/2023), 194 mg (8/4/2023)           Review of Systems   Constitutional: Positive for fatigue. Negative for activity change, appetite change, chills, fever and unexpected weight change. HENT: Negative for mouth sores. Eyes: Negative. Respiratory: Negative for cough, chest tightness, shortness of breath and wheezing. Cardiovascular: Negative for chest pain, palpitations and leg swelling. Gastrointestinal: Positive for nausea. Negative for abdominal distention, abdominal pain, anal bleeding, blood in stool, constipation, diarrhea and vomiting. Endocrine: Positive for cold intolerance. Genitourinary: Negative for difficulty urinating, dysuria, flank pain, frequency, hematuria, pelvic pain, urgency, vaginal bleeding, vaginal discharge and vaginal pain. Musculoskeletal: Positive for arthralgias. Negative for myalgias. Skin: Negative for color change, pallor and rash. Neurological: Negative for dizziness, weakness, numbness and headaches. Hematological: Negative. Psychiatric/Behavioral: The patient is not nervous/anxious. Video Exam    There were no vitals filed for this visit. Physical Exam  Constitutional:       General: She is not in acute distress. Appearance: Normal appearance. She is not ill-appearing. HENT:      Head: Normocephalic and atraumatic. Eyes:      General: No scleral icterus. Right eye: No discharge. Left eye: No discharge. Conjunctiva/sclera: Conjunctivae normal.   Pulmonary:      Effort: Pulmonary effort is normal.   Skin:     Coloration: Skin is not jaundiced. Findings: No rash. Neurological:      General: No focal deficit present. Mental Status: She is alert and oriented to person, place, and time. Motor: No weakness. Psychiatric:         Mood and Affect: Mood normal.         Behavior: Behavior normal.         Thought Content:  Thought content normal.         Judgment: Judgment normal.          Visit Time  Total Visit Duration: 15 min

## 2023-08-16 NOTE — ASSESSMENT & PLAN NOTE
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 She is s/p one cycle of adjuvant chemotherapy with Cisplatin 20 mg/m2 on days 1-5 + etoposide 100mg/m2 on days 1-5 and bleomycin 30 mg IV on days 1, 8, and 15 of a 21 day cycle. She received days 1-5 inpatient and days 8 and 15 outpatient. Overall, she tolerated treatment well. She had some mild nausea at home and 1 episode of vomiting this past Monday night (day 15). She is fatigued and feels achy. Her PS is 0. Patient will proceed with cycle 2 as planned, pending labs. Days 1-5 again planned for inpatient administration (in B due to staffing at [address] [place] [address]). She will return to the office as per her chemotherapy calendar.

## 2023-08-16 NOTE — TELEPHONE ENCOUNTER
LMOM for patient to make her aware that I confirmed admission to [facility] for 8/21 at 8AM. Address provided. P9, PAC, and B pharmacy aware.

## 2023-08-17 ENCOUNTER — APPOINTMENT (OUTPATIENT)
Dept: LAB | Facility: HOSPITAL | Age: 33
End: 2023-08-17
Payer: COMMERCIAL

## 2023-08-17 DIAGNOSIS — C56.2 MALIGNANT NEOPLASM OF LEFT OVARY (HCC): ICD-10-CM

## 2023-08-17 LAB
ALBUMIN SERPL BCP-MCNC: 3.7 G/DL (ref 3.5–5)
ALP SERPL-CCNC: 82 U/L (ref 34–104)
ALT SERPL W P-5'-P-CCNC: 39 U/L (ref 7–52)
ANION GAP SERPL CALCULATED.3IONS-SCNC: 9 MMOL/L
ANISOCYTOSIS BLD QL SMEAR: PRESENT
AST SERPL W P-5'-P-CCNC: 25 U/L (ref 13–39)
BASOPHILS # BLD MANUAL: 0 THOUSAND/UL (ref 0–0.1)
BASOPHILS NFR MAR MANUAL: 0 % (ref 0–1)
BILIRUB SERPL-MCNC: 0.21 MG/DL (ref 0.2–1)
BUN SERPL-MCNC: 7 MG/DL (ref 5–25)
CALCIUM SERPL-MCNC: 8.9 MG/DL (ref 8.4–10.2)
CHLORIDE SERPL-SCNC: 104 MMOL/L (ref 96–108)
CO2 SERPL-SCNC: 25 MMOL/L (ref 21–32)
CREAT SERPL-MCNC: 0.77 MG/DL (ref 0.6–1.3)
EOSINOPHIL # BLD MANUAL: 0 THOUSAND/UL (ref 0–0.4)
EOSINOPHIL NFR BLD MANUAL: 0 % (ref 0–6)
ERYTHROCYTE [DISTWIDTH] IN BLOOD BY AUTOMATED COUNT: 13.6 % (ref 11.6–15.1)
GFR SERPL CREATININE-BSD FRML MDRD: 101 ML/MIN/1.73SQ M
GLUCOSE P FAST SERPL-MCNC: 118 MG/DL (ref 65–99)
HCT VFR BLD AUTO: 34.3 % (ref 34.8–46.1)
HGB BLD-MCNC: 10.8 G/DL (ref 11.5–15.4)
LYMPHOCYTES # BLD AUTO: 28 % (ref 14–44)
LYMPHOCYTES # BLD AUTO: 3.95 THOUSAND/UL (ref 0.6–4.47)
MACROCYTES BLD QL AUTO: PRESENT
MAGNESIUM SERPL-MCNC: 2.1 MG/DL (ref 1.9–2.7)
MCH RBC QN AUTO: 28.9 PG (ref 26.8–34.3)
MCHC RBC AUTO-ENTMCNC: 31.5 G/DL (ref 31.4–37.4)
MCV RBC AUTO: 92 FL (ref 82–98)
METAMYELOCYTES NFR BLD MANUAL: 5 % (ref 0–1)
MICROCYTES BLD QL AUTO: PRESENT
MONOCYTES # BLD AUTO: 0.26 THOUSAND/UL (ref 0–1.22)
MONOCYTES NFR BLD: 2 % (ref 4–12)
MYELOCYTES NFR BLD MANUAL: 7 % (ref 0–1)
NEUTROPHILS # BLD MANUAL: 7.36 THOUSAND/UL (ref 1.85–7.62)
NEUTS BAND NFR BLD MANUAL: 7 % (ref 0–8)
NEUTS SEG NFR BLD AUTO: 49 % (ref 43–75)
PLATELET # BLD AUTO: 265 THOUSANDS/UL (ref 149–390)
PLATELET BLD QL SMEAR: ADEQUATE
PMV BLD AUTO: 9.2 FL (ref 8.9–12.7)
POTASSIUM SERPL-SCNC: 3.8 MMOL/L (ref 3.5–5.3)
PROT SERPL-MCNC: 7.1 G/DL (ref 6.4–8.4)
RBC # BLD AUTO: 3.74 MILLION/UL (ref 3.81–5.12)
RBC MORPH BLD: PRESENT
SODIUM SERPL-SCNC: 138 MMOL/L (ref 135–147)
STOMATOCYTES BLD QL SMEAR: PRESENT
TOXIC GRANULES BLD QL SMEAR: PRESENT
VARIANT LYMPHS # BLD AUTO: 2 %
WBC # BLD AUTO: 13.15 THOUSAND/UL (ref 4.31–10.16)

## 2023-08-17 PROCEDURE — 85027 COMPLETE CBC AUTOMATED: CPT

## 2023-08-17 PROCEDURE — 80053 COMPREHEN METABOLIC PANEL: CPT

## 2023-08-17 PROCEDURE — 83735 ASSAY OF MAGNESIUM: CPT

## 2023-08-17 PROCEDURE — 85007 BL SMEAR W/DIFF WBC COUNT: CPT

## 2023-08-18 RX ORDER — SODIUM CHLORIDE 9 MG/ML
20 INJECTION, SOLUTION INTRAVENOUS ONCE
OUTPATIENT
Start: 2023-08-28

## 2023-08-18 RX ORDER — SODIUM CHLORIDE 9 MG/ML
20 INJECTION, SOLUTION INTRAVENOUS ONCE
OUTPATIENT
Start: 2023-09-05

## 2023-08-18 RX ORDER — ACETAMINOPHEN 325 MG/1
650 TABLET ORAL ONCE
OUTPATIENT
Start: 2023-09-05

## 2023-08-18 RX ORDER — ACETAMINOPHEN 325 MG/1
650 TABLET ORAL ONCE
Status: CANCELLED
Start: 2023-08-22 | End: 2023-08-22

## 2023-08-18 RX ORDER — SODIUM CHLORIDE 9 MG/ML
20 INJECTION, SOLUTION INTRAVENOUS ONCE
Status: CANCELLED | OUTPATIENT
Start: 2023-08-22

## 2023-08-18 RX ORDER — ACETAMINOPHEN 325 MG/1
650 TABLET ORAL ONCE
OUTPATIENT
Start: 2023-08-28

## 2023-08-18 RX ORDER — SODIUM CHLORIDE 9 MG/ML
20 INJECTION, SOLUTION INTRAVENOUS ONCE
Status: CANCELLED | OUTPATIENT
Start: 2023-08-25

## 2023-08-18 RX ORDER — ACETAMINOPHEN 325 MG/1
650 TABLET ORAL ONCE
Status: CANCELLED | OUTPATIENT
Start: 2023-08-21

## 2023-08-18 RX ORDER — SODIUM CHLORIDE 9 MG/ML
20 INJECTION, SOLUTION INTRAVENOUS ONCE
Status: CANCELLED | OUTPATIENT
Start: 2023-08-23

## 2023-08-18 RX ORDER — SODIUM CHLORIDE 9 MG/ML
20 INJECTION, SOLUTION INTRAVENOUS ONCE
Status: CANCELLED | OUTPATIENT
Start: 2023-08-21

## 2023-08-18 RX ORDER — SODIUM CHLORIDE 9 MG/ML
20 INJECTION, SOLUTION INTRAVENOUS ONCE
Status: CANCELLED | OUTPATIENT
Start: 2023-08-24

## 2023-08-21 ENCOUNTER — HOSPITAL ENCOUNTER (INPATIENT)
Facility: HOSPITAL | Age: 33
LOS: 4 days | Discharge: HOME | End: 2023-08-25
Attending: OBSTETRICS & GYNECOLOGY | Admitting: OBSTETRICS & GYNECOLOGY
Payer: COMMERCIAL

## 2023-08-21 DIAGNOSIS — Z51.11 ADMISSION FOR CHEMOTHERAPY: Primary | ICD-10-CM

## 2023-08-21 DIAGNOSIS — C56.9 MALIGNANT NEOPLASM OF OVARY, UNSPECIFIED LATERALITY (HCC): ICD-10-CM

## 2023-08-21 PROCEDURE — 99223 1ST HOSP IP/OBS HIGH 75: CPT | Performed by: STUDENT IN AN ORGANIZED HEALTH CARE EDUCATION/TRAINING PROGRAM

## 2023-08-21 RX ORDER — ONDANSETRON 2 MG/ML
4 INJECTION INTRAMUSCULAR; INTRAVENOUS EVERY 6 HOURS PRN
Status: DISCONTINUED | OUTPATIENT
Start: 2023-08-21 | End: 2023-08-25 | Stop reason: HOSPADM

## 2023-08-21 RX ORDER — ACETAMINOPHEN 325 MG/1
650 TABLET ORAL ONCE
Status: COMPLETED | OUTPATIENT
Start: 2023-08-21 | End: 2023-08-21

## 2023-08-21 RX ORDER — LORAZEPAM 1 MG/1
1 TABLET ORAL EVERY 6 HOURS PRN
Status: DISCONTINUED | OUTPATIENT
Start: 2023-08-21 | End: 2023-08-25 | Stop reason: HOSPADM

## 2023-08-21 RX ORDER — DOCUSATE SODIUM 100 MG/1
100 CAPSULE, LIQUID FILLED ORAL 2 TIMES DAILY
Status: DISCONTINUED | OUTPATIENT
Start: 2023-08-21 | End: 2023-08-25 | Stop reason: HOSPADM

## 2023-08-21 RX ORDER — SODIUM CHLORIDE 9 MG/ML
20 INJECTION, SOLUTION INTRAVENOUS ONCE
Status: COMPLETED | OUTPATIENT
Start: 2023-08-21 | End: 2023-08-21

## 2023-08-21 RX ADMIN — ETOPOSIDE 194 MG: 20 INJECTION INTRAVENOUS at 15:44

## 2023-08-21 RX ADMIN — ACETAMINOPHEN 650 MG: 325 TABLET, FILM COATED ORAL at 14:42

## 2023-08-21 RX ADMIN — SODIUM CHLORIDE 500 ML: 0.9 INJECTION, SOLUTION INTRAVENOUS at 13:59

## 2023-08-21 RX ADMIN — FOSAPREPITANT 150 MG: 150 INJECTION, POWDER, LYOPHILIZED, FOR SOLUTION INTRAVENOUS at 14:05

## 2023-08-21 RX ADMIN — SODIUM CHLORIDE 20 ML/HR: 0.9 INJECTION, SOLUTION INTRAVENOUS at 15:14

## 2023-08-21 RX ADMIN — BLEOMYCIN SULFATE 30 UNITS: 30 POWDER, FOR SOLUTION INTRAMUSCULAR; INTRAPLEURAL; INTRAVENOUS; SUBCUTANEOUS at 15:15

## 2023-08-21 RX ADMIN — DIPHENHYDRAMINE HYDROCHLORIDE 25 MG: 50 INJECTION, SOLUTION INTRAMUSCULAR; INTRAVENOUS at 14:42

## 2023-08-21 RX ADMIN — SODIUM CHLORIDE 500 ML: 0.9 INJECTION, SOLUTION INTRAVENOUS at 18:27

## 2023-08-21 RX ADMIN — ONDANSETRON: 2 INJECTION INTRAMUSCULAR; INTRAVENOUS at 14:05

## 2023-08-21 RX ADMIN — SODIUM CHLORIDE 38.8 MG: 0.9 INJECTION, SOLUTION INTRAVENOUS at 17:10

## 2023-08-21 NOTE — PROGRESS NOTES
Pt arrived to unit this am for inpatient chemo. Attempted to access port-a-cath for chemo treatment at 0930 but was unsuccessful and port was not able to flush, despite trying to move pt's arm to see if it was positional.    CC, Debora Childs RN attempted the 2nd try at 0935. 2nd access successful with good blood return noted, however needle pushed itself out of port and skin spontaneously before any dressing was able to be placed on top. 3rd attempt with larger needle tried at [address] [facility] by Sunitha Reardon RN. Good blood return noted, and dressings applied taut to skin. Needle inspected, noted to be intact. Blood return re-checked after dressings applied, still good flush and blood return. This nurse was notified at [address] [place] [address],[address] that pt rang call bell stating that needle came out again. Upon inspection, needle looked to have pushed out but end of needle was still in the port. Attempted to flush and check for blood return, still good flush and blood return. Pt was very concerned about needle pushing out, stating this has not happened in the 3 other times her port has been accessed. Advised pt that port is still accessed and working for now, but may need to be looked at again as there is a possibility that needle may push all the way out if dressing were to come loose or undone. Infusion center called, advised this nurse to reach out to vascular access team to take a look at pt's port. Awaiting reply back from vascular access team at this time. Pt's port is accessed and saline locked at this time. 1213: vascular access team examined port and gave pt reassurance that port is ok to use for treatment therapy.

## 2023-08-21 NOTE — PLAN OF CARE
Problem: PAIN - ADULT  Goal: Verbalizes/displays adequate comfort level or baseline comfort level  Description: Interventions:  - Encourage patient to monitor pain and request assistance  - Assess pain using appropriate pain scale  - Administer analgesics based on type and severity of pain and evaluate response  - Implement non-pharmacological measures as appropriate and evaluate response  - Consider cultural and social influences on pain and pain management  - Notify physician/advanced practitioner if interventions unsuccessful or patient reports new pain  Outcome: Progressing     Problem: INFECTION - ADULT  Goal: Absence or prevention of progression during hospitalization  Description: INTERVENTIONS:  - Assess and monitor for signs and symptoms of infection  - Monitor lab/diagnostic results  - Monitor all insertion sites, i.e. indwelling lines, tubes, and drains  - Monitor endotracheal if appropriate and nasal secretions for changes in amount and color  - Rosser appropriate cooling/warming therapies per order  - Administer medications as ordered  - Instruct and encourage patient and family to use good hand hygiene technique  - Identify and instruct in appropriate isolation precautions for identified infection/condition  Outcome: Progressing

## 2023-08-21 NOTE — DISCHARGE SUMMARY
Discharge Summary   Gloria Serrano MRN: [id number]  Unit/Bed#: [facility] 484-07 Encounter: [id number]      Admission Date: 8/21/2023     Discharge Date: 8/25/2023    Attending: No att. providers found    Principal Diagnosis: Malignant neoplasm of left ovary ([address]) [C56.2]    Procedures: none    Hospital course: Hospital course: Patient was admitted for inpatient adjuvant chemotherapy s/p OCHOA/BSO with debulking for 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 Her baseline labs and PFTs were normal prior to admission. She received bleomycin on hospital day 1 along with cisplatin and etoposide on days 1-5. She tolerated the treatments well with her only complaint being nausea that was well controlled with medication. Her labs were checked on hospital day 4 & 5 and found to be normal. She was discharged home following treatment on day 5. Lab Results:   Lab Results   Component Value Date    WBC 6.69 08/24/2023    HGB 10.7 (L) 08/24/2023    HCT 33.7 (L) 08/24/2023    MCV 92 08/24/2023     08/24/2023     Lab Results   Component Value Date    CALCIUM 9.0 08/24/2023    K 4.1 08/24/2023    CO2 26 08/24/2023     08/24/2023    BUN 11 08/24/2023    CREATININE 0.64 08/24/2023     No results found for: "POCGLU"  Lab Results   Component Value Date    PTT 29 05/22/2023     Lab Results   Component Value Date    INR 0.99 05/22/2023    PROTIME 13.2 44/32/1512       Complications: none apparent    Condition at discharge: stable     Discharge instructions/Information to patient and family:   See After Visit Summary for information provided to patient and family. Provisions for Follow-Up Care:  See After Visit Summary for information related to follow-up care and any pertinent home health orders. Disposition: See After Visit Summary for discharge disposition information. Planned Readmission: Yes, pending clinical status    Discharge Medications:   For a complete list of the patient's medications, please refer to her med rec.     Usha Clinton DO  8/25/2023  5:22 PM

## 2023-08-21 NOTE — ASSESSMENT & PLAN NOTE
S/p filgrastim on 8/7  WBC 13 on admission  Lab Results   Component Value Date/Time    WBC 6.69 08/24/2023 05:32 AM

## 2023-08-21 NOTE — ASSESSMENT & PLAN NOTE
6/13/23: status post OCHOA/BSO debulking for 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  There was no sign of any other tumor in the abdomen or other biopsies.  Chest x-ray was negative

## 2023-08-21 NOTE — H&P
For questions/concerns on this patient, please reach out to the following:  SLB-OB GYN-GynOnc- Resident/AP      H&P Exam - Gynecology Oncology  Libertad Sandy 27 y.o. female MRN: [id number]  Unit/Bed#: [facility] 915-01 Encounter: [id number]        Assessment/Plan   * Malignant neoplasm of ovary ([address])  Assessment & Plan  Cycle 2 of Bleomycin days 1, 8 and 15 of a 21-day cycle and etoposide+cisplatin days 1 through 5. 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  There was no sign of any other tumor in the abdomen or other biopsies.  Chest x-ray was negative    Chemotherapy induced neutropenia (HCC)  Assessment & Plan  S/p filgrastim on 8/7  WBC 13 on admission         History of Present Illness     HPI:  Libertad Sandy is a 27 y.o. female who presents for cycle #2 of BEP chemotherapy for her stage IIIB ovarian cancer. No complaints this morning. Her main chemo side effect has been nausea with an episode emesis following her infusion last week. She also described chills/full body shaking, generalized body aches and fatigue which resolved a few days after her last treatment. Oncology History   Malignant neoplasm of ovary ([address])   6/13/2023 Surgery    Patient underwent exploratory laparotomy OCHOA/BSO and staging procedure. Intra-Op findings revealed a large left adnexal mass approximately 12 to 14 cm densely adherent to the sigmoid colon.   It ruptured releasing chocolate fluid into the abdomen however prior staining of the omentum indicated likely previous rupture. Final pathology report after extensive review by [facility] was most consistent with endometrioid adenocarcinoma initiating within endometriosis within the ovary. Additionally yolk sac tumor was identified which extended to the local:. Making this a stage IIB tumor. Would recommend 4 cycles of bleomycin etoposide and cis-platinum. 7/11/2023 Initial Diagnosis    Malignant neoplasm of left ovary ([address])     7/11/2023 -  Cancer Staged    Staging form: Ovary, Fallopian Tube, Primary Peritoneal, AJCC 8th Edition  - Clinical stage from 7/11/2023: Stage IIB (cT2b, cN0, cM0) - Signed by Martín Gruber MD on 7/11/2023  Histopathologic type: Yolk sac tumor  Stage prefix: Initial diagnosis       7/31/2023 -  Chemotherapy    alteplase (CATHFLO), 2 mg, Intracatheter, Every 1 Minute as needed, 1 of 4 cycles  pegfilgrastim (Donne Opal), 6 mg, Subcutaneous, Once, 1 of 4 cycles  Administration: 6 mg (8/7/2023)  bleomycin (BLENOXANE) IVPB, 30 Units, Intravenous, Once, 1 of 4 cycles  Administration: 30 Units (7/31/2023), 30 Units (8/7/2023), 30 Units (8/14/2023)  CISplatin (PLATINOL) infusion, 20 mg/m2 = 38.8 mg, Intravenous, Once, 1 of 4 cycles  Administration: 38.8 mg (7/31/2023), 38.8 mg (8/1/2023), 38.8 mg (8/2/2023), 38.8 mg (8/3/2023), 38.8 mg (8/4/2023)  fosaprepitant (EMEND) IVPB, 150 mg, Intravenous, Once, 1 of 4 cycles  Administration: 150 mg (7/31/2023)  etoposide (TOPOSAR), 100 mg/m2 = 194 mg, Intravenous, Once, 1 of 4 cycles  Administration: 194 mg (7/31/2023), 194 mg (8/1/2023), 194 mg (8/2/2023), 194 mg (8/3/2023), 194 mg (8/4/2023)         Review of Systems   Constitutional: Negative. HENT: Negative. Respiratory: Negative. Cardiovascular: Negative. Gastrointestinal: Negative. Genitourinary: Negative. Musculoskeletal: Negative. Neurological: Negative. Psychiatric/Behavioral: Negative. All other systems reviewed and are negative.       Historical Information   Past Medical History:   Diagnosis Date   • No known health problems    • PONV (postoperative nausea and vomiting)      Past Surgical History:   Procedure Laterality Date   • HYSTERECTOMY N/A 2023    Procedure: OCHOA/BSO, STAGING radical omentectomy bilateral pelvic lymph node dissection;  Surgeon: Anabella Dasilva MD;  [facility];  Service: Gynecology Oncology   • IR PORT PLACEMENT  2023   • ORIF TIBIA & FIBULA FRACTURES Right    • IA EXPLORATORY LAPAROTOMY CELIOTOMY W/WO BIOPSY SPX N/A 2023    Procedure: EX LAP;  Surgeon: Anabella Dasilva MD;  Location: [facility];  Service: Gynecology Oncology   • URETERAL STENT PLACEMENT Bilateral 2023    Procedure: CYSTO; STENT URETERAL;  Surgeon: Sahil Garcia MD;  Location: [facility];  Service: Urology   • WISDOM TOOTH EXTRACTION       OB History    Para Term  AB Living   0 0 0 0 0 0   SAB IAB Ectopic Multiple Live Births   0 0 0 0 0     Family History   Problem Relation Age of Onset   • Ovarian cysts Mother    • No Known Problems Father      Social History   Social History     Substance and Sexual Activity   Alcohol Use Yes   • Alcohol/week: 5.0 standard drinks of alcohol   • Types: 5 Standard drinks or equivalent per week    Comment: Occasional     Social History     Substance and Sexual Activity   Drug Use Yes   • Types: Marijuana    Comment: daily vape last used      Social History     Tobacco Use   Smoking Status Never   Smokeless Tobacco Never       Meds/Allergies   Medications Prior to Admission   Medication   • cholecalciferol (VITAMIN D3) 1,000 units tablet   • docusate sodium (COLACE) 100 mg capsule   • LORazepam (ATIVAN) 1 mg tablet   • Multiple Vitamin (multivitamin) tablet   • ondansetron (ZOFRAN) 8 mg tablet   • ondansetron (ZOFRAN-ODT) 8 mg disintegrating tablet     Allergies   Allergen Reactions   • Penicillins Itching and Rash Rash  Rash         Objective     /91   Pulse 89   Temp 98.9 °F (37.2 °C)   Resp 16   Ht 5' 6" (1.676 m)   Wt 81.7 kg (180 lb 1.9 oz)   LMP 05/15/2023 (Exact Date)   SpO2 97%   BMI 29.07 kg/m²     No intake/output data recorded. No intake/output data recorded. Lab Results   Component Value Date    WBC 13.15 (H) 08/17/2023    HGB 10.8 (L) 08/17/2023    HCT 34.3 (L) 08/17/2023    MCV 92 08/17/2023     08/17/2023       Lab Results   Component Value Date    CALCIUM 8.9 08/17/2023    K 3.8 08/17/2023    CO2 25 08/17/2023     08/17/2023    BUN 7 08/17/2023    CREATININE 0.77 08/17/2023       Physical Exam  Vitals reviewed. Constitutional:       General: She is not in acute distress. Appearance: She is normal weight. HENT:      Mouth/Throat:      Mouth: Mucous membranes are moist.      Pharynx: Oropharynx is clear. Eyes:      Extraocular Movements: Extraocular movements intact. Conjunctiva/sclera: Conjunctivae normal.   Cardiovascular:      Rate and Rhythm: Normal rate and regular rhythm. Pulmonary:      Effort: Pulmonary effort is normal. No respiratory distress. Abdominal:      General: Abdomen is flat. There is no distension. Tenderness: There is no abdominal tenderness. Comments: Incision healing well   Skin:     General: Skin is warm and dry. Neurological:      General: No focal deficit present. Mental Status: She is alert and oriented to person, place, and time. Mental status is at baseline. Imaging: I have personally reviewed pertinent reports. EKG, Pathology, and Other Studies: I have personally reviewed pertinent reports.         Code Status: Level 1 - Full Code    Holly Goldsmith MD  8/21/2023  11:39 AM

## 2023-08-21 NOTE — ASSESSMENT & PLAN NOTE
Cycle 2 of Bleomycin days 1, 8 and 15 of a 21-day cycle and etoposide+cisplatin days 1 through 5.  Plan for 4 cycles   S/p PFT on 7/27: DLCO corrected for patients hemoglobin level: 109 %, Normal spirometry  Lines:  right chest port   FEN: regular diet  DVT PPX: ambulation, SCDs  Meds ordered PRN for any chemotherapy-related symptoms    Tumor Marker Trends (as of 8/17)  CA-125: 214 -> 25 -> 28  AFP: 303 -> 154    Follow up AM labs  8/25

## 2023-08-22 PROCEDURE — 99233 SBSQ HOSP IP/OBS HIGH 50: CPT | Performed by: OBSTETRICS & GYNECOLOGY

## 2023-08-22 RX ORDER — ACETAMINOPHEN 325 MG/1
650 TABLET ORAL EVERY 6 HOURS PRN
Status: DISCONTINUED | OUTPATIENT
Start: 2023-08-22 | End: 2023-08-25 | Stop reason: HOSPADM

## 2023-08-22 RX ORDER — ACETAMINOPHEN 325 MG/1
650 TABLET ORAL ONCE
Status: COMPLETED | OUTPATIENT
Start: 2023-08-22 | End: 2023-08-22

## 2023-08-22 RX ORDER — SODIUM CHLORIDE 9 MG/ML
20 INJECTION, SOLUTION INTRAVENOUS ONCE
Status: COMPLETED | OUTPATIENT
Start: 2023-08-22 | End: 2023-08-22

## 2023-08-22 RX ADMIN — SODIUM CHLORIDE 20 ML/HR: 0.9 INJECTION, SOLUTION INTRAVENOUS at 11:17

## 2023-08-22 RX ADMIN — ACETAMINOPHEN 650 MG: 325 TABLET, FILM COATED ORAL at 19:36

## 2023-08-22 RX ADMIN — ONDANSETRON 4 MG: 2 INJECTION INTRAMUSCULAR; INTRAVENOUS at 07:27

## 2023-08-22 RX ADMIN — ACETAMINOPHEN 650 MG: 325 TABLET, FILM COATED ORAL at 07:27

## 2023-08-22 RX ADMIN — CISPLATIN 38.8 MG: 50 INJECTION, SOLUTION INTRAVENOUS at 13:27

## 2023-08-22 RX ADMIN — DOCUSATE SODIUM 100 MG: 100 CAPSULE, LIQUID FILLED ORAL at 07:27

## 2023-08-22 RX ADMIN — SODIUM CHLORIDE 500 ML: 0.9 INJECTION, SOLUTION INTRAVENOUS at 10:14

## 2023-08-22 RX ADMIN — ONDANSETRON: 2 INJECTION INTRAMUSCULAR; INTRAVENOUS at 11:20

## 2023-08-22 RX ADMIN — ETOPOSIDE 194 MG: 20 INJECTION INTRAVENOUS at 12:18

## 2023-08-22 RX ADMIN — ONDANSETRON 4 MG: 2 INJECTION INTRAMUSCULAR; INTRAVENOUS at 18:34

## 2023-08-22 RX ADMIN — SODIUM CHLORIDE 500 ML: 0.9 INJECTION, SOLUTION INTRAVENOUS at 14:30

## 2023-08-22 NOTE — PROGRESS NOTES
Spoke with chemo pharmacist regarding starting cisplatin and etoposide 2 hours earlier since yesterday dose. Pharmacy was okay to start chemo post pre-med administration.

## 2023-08-22 NOTE — PROGRESS NOTES
For questions/concerns on this patient, please reach out to the following:  SL[facility]-OB GYN-GynOnc- Resident/AP  Gyn Oncology Progress note   Momo Rice 27 y.o. female MRN: [id number]  Unit/Bed#: [facility] 915-01 Encounter: [id number]    Assessment/Plan:    27 y. o. who presents for cycle #2 of BEP chemotherapy for her stage IIB ovarian cancer. * Malignant neoplasm of ovary (HCC)  Assessment & Plan  Cycle 2 of Bleomycin days 1, 8 and 15 of a 21-day cycle and etoposide+cisplatin days 1 through 5. 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  There was no sign of any other tumor in the abdomen or other biopsies.  Chest x-ray was negative    Chemotherapy induced neutropenia ([address])  Assessment & Plan  S/p filgrastim on 8/7  WBC 13 on admission         Subjective:    Jennifer Ann has no current complaints. Pain is well controlled. Patient is currently voiding. She is ambulating. Patient is currently passing flatus and has had bowel movement. She is tolerating PO, and denies nausea or vomitting. Patient denies fever, chills, chest pain, shortness of breath, or calf tenderness. Objective:  /86 (BP Location: Right arm)   Pulse 77   Temp 97.8 °F (36.6 °C) (Oral)   Resp 16   Ht 5' 6" (1.676 m)   Wt 81.7 kg (180 lb 1.9 oz)   LMP 05/15/2023 (Exact Date)   SpO2 98%   BMI 29.07 kg/m²     I/O last 3 completed shifts:   In: 500 [IV Piggyback:500]  Out: -   No intake/output data recorded. Lab Results   Component Value Date    WBC 13.15 (H) 08/17/2023    HGB 10.8 (L) 08/17/2023    HCT 34.3 (L) 08/17/2023    MCV 92 08/17/2023     08/17/2023       Lab Results   Component Value Date    CALCIUM 8.9 08/17/2023    K 3.8 08/17/2023    CO2 25 08/17/2023     08/17/2023    BUN 7 08/17/2023    CREATININE 0.77 08/17/2023           Physical Exam  Vitals reviewed. Constitutional:       General: She is not in acute distress. Appearance: She is normal weight. HENT:      Mouth/Throat:      Pharynx: Oropharynx is clear. Eyes:      Extraocular Movements: Extraocular movements intact. Conjunctiva/sclera: Conjunctivae normal.   Cardiovascular:      Rate and Rhythm: Normal rate and regular rhythm. Pulmonary:      Effort: Pulmonary effort is normal. No respiratory distress. Abdominal:      General: Abdomen is flat. There is no distension. Tenderness: There is no abdominal tenderness. Musculoskeletal:         General: Normal range of motion. Skin:     General: Skin is warm and dry. Neurological:      General: No focal deficit present. Mental Status: She is alert and oriented to person, place, and time. Mental status is at baseline.            Leatha Ahmadi MD  8/22/2023  7:20 AM

## 2023-08-22 NOTE — PLAN OF CARE
Problem: PAIN - ADULT  Goal: Verbalizes/displays adequate comfort level or baseline comfort level  Description: Interventions:  - Encourage patient to monitor pain and request assistance  - Assess pain using appropriate pain scale  - Administer analgesics based on type and severity of pain and evaluate response  - Implement non-pharmacological measures as appropriate and evaluate response  - Consider cultural and social influences on pain and pain management  - Notify physician/advanced practitioner if interventions unsuccessful or patient reports new pain  Outcome: Progressing     Problem: INFECTION - ADULT  Goal: Absence or prevention of progression during hospitalization  Description: INTERVENTIONS:  - Assess and monitor for signs and symptoms of infection  - Monitor lab/diagnostic results  - Monitor all insertion sites, i.e. indwelling lines, tubes, and drains  - Monitor endotracheal if appropriate and nasal secretions for changes in amount and color  - [place] appropriate cooling/warming therapies per order  - Administer medications as ordered  - Instruct and encourage patient and family to use good hand hygiene technique  - Identify and instruct in appropriate isolation precautions for identified infection/condition  Outcome: Progressing

## 2023-08-22 NOTE — UTILIZATION REVIEW
Initial Clinical Review    Admission: Date/Time/Statement:   Admission Orders (From admission, onward)     Ordered        08/21/23 0813  Inpatient Admission  Once                      Orders Placed This Encounter   Procedures   • Inpatient Admission     Standing Status:   Standing     Number of Occurrences:   1     Order Specific Question:   Level of Care     Answer:   Med Surg [16]     Order Specific Question:   Estimated length of stay     Answer:   More than 2 Midnights     Order Specific Question:   Certification     Answer:   I certify that inpatient services are medically necessary for this patient for a duration of greater than two midnights. See H&P and MD Progress Notes for additional information about the patient's course of treatment. Initial Presentation: 27 y.o. female  presents for cycle #2 of BEP chemotherapy for her stage IIIB ovarian cancer. No complaints this morning. Her main chemo side effect has been nausea with an episode emesis following her infusion last week. She also described chills/full body shaking, generalized body aches and fatigue which resolved a few days after her last treatment. Admit Inpatient med surg, Cycle 2 of Bleomycin days 1, 8 and 15 of a 21-day cycle and etoposide+cisplatin days 1 through 5. Plan for 4 cycles. REgular diet, access port-a-cath. Date: 8/22   Day 2:   No current complaints. Pain is well controlled. Patient is currently voiding and ambulating, passing flatus and has had bowel movement. She is tolerating PO, and denies nausea or vomitting. Patient denies fever, chills, chest pain, shortness of breath, or calf tenderness.  Continue above tx plan.       Triage Vitals   Temperature Pulse Respirations Blood Pressure SpO2   08/21/23 0856 08/21/23 0856 08/21/23 0856 08/21/23 0856 08/21/23 0856   98.9 °F (37.2 °C) 89 16 130/91 97 %      Temp Source Heart Rate Source Patient Position - Orthostatic VS BP Location FiO2 (%)   08/21/23 2207 08/22/23 0732 08/21/23 2207 08/21/23 2207 --   Oral Monitor Lying Left arm       Pain Score       08/21/23 0815       No Pain          Wt Readings from Last 1 Encounters:   08/21/23 81.7 kg (180 lb 1.9 oz)     Additional Vital Signs:   Date/Time Temp Pulse Resp BP MAP (mmHg) SpO2 O2 Device Patient Position - Orthostatic VS   08/22/23 07:32:15 98.4 °F (36.9 °C) 83 18 133/88 103 98 % None (Room air) Sitting   08/22/23 02:59:02 97.8 °F (36.6 °C) -- 16 135/86 102 -- -- Lying   08/21/23 22:07:23 98.5 °F (36.9 °C) -- 16 108/70 83 -- -- Lying   08/21/23 15:40:21 -- 77 -- 129/87 101 98 % -- --   08/21/23 08:56:33 98.9 °F (37.2 °C) 89 16 130/91 104 97 % -- --   08/21/23 0815 -- -- -- -- -- -- None (Room air) --     Pertinent Labs/Diagnostic Test Results:       Results from last 7 days   Lab Units 08/17/23  0826   WBC Thousand/uL 13.15*   HEMOGLOBIN g/dL 10.8*   HEMATOCRIT % 34.3*   PLATELETS Thousands/uL 265   BANDS PCT % 7         Results from last 7 days   Lab Units 08/17/23  0826   SODIUM mmol/L 138   POTASSIUM mmol/L 3.8   CHLORIDE mmol/L 104   CO2 mmol/L 25   ANION GAP mmol/L 9   BUN mg/dL 7   CREATININE mg/dL 0.77   EGFR ml/min/1.73sq m 101   CALCIUM mg/dL 8.9   MAGNESIUM mg/dL 2.1     Results from last 7 days   Lab Units 08/17/23  0826   AST U/L 25   ALT U/L 39   ALK PHOS U/L 82   TOTAL PROTEIN g/dL 7.1   ALBUMIN g/dL 3.7   TOTAL BILIRUBIN mg/dL 0.21         Past Medical History:   Diagnosis Date   • No known health problems    • PONV (postoperative nausea and vomiting)      Present on Admission:  • Malignant neoplasm of ovary (HCC)  • Chemotherapy induced neutropenia (HCC)  • Status post total abdominal hysterectomy and bilateral salpingo-oophorectomy (OCHOA-BSO)      Admitting Diagnosis: Malignant neoplasm of left ovary (HCC) [C56.2]  Age/Sex: 27 y.o. female  Admission Orders:  Scheduled Medications:  CISplatin (PLATINOL) 38.8 mg in sodium chloride 0.9 % 500 mL infusion, 20 mg/m2 (Treatment Plan Recorded), Intravenous, Once  docusate sodium, 100 mg, Oral, BID  etoposide, 100 mg/m2 (Treatment Plan Recorded), Intravenous, Once  ondansetron (ZOFRAN) 16 mg, dexamethasone (DECADRON) 10 mg in sodium chloride 0.9 % 50 mL IVPB, , Intravenous, Once  sodium chloride, 500 mL, Intravenous, Once  sodium chloride, 500 mL, Intravenous, Once  sodium chloride, 20 mL/hr, Intravenous, Once      Continuous IV Infusions: none     PRN Meds:  alteplase, 2 mg, Intracatheter, Q1MIN PRN  alteplase, 2 mg, Intracatheter, Q1MIN PRN  LORazepam, 1 mg, Oral, Q6H PRN  ondansetron, 4 mg, Intravenous, Q6H PRN x1 thus far      scd    Network Utilization Review Department  ATTENTION: Please call with any questions or concerns to [phone] and carefully listen to the prompts so that you are directed to the right person. All voicemails are confidential.  Jose Cazares all requests for admission clinical reviews, approved or denied determinations and any other requests to dedicated fax number below belonging to the campus where the patient is receiving treatment.  List of dedicated fax numbers for the Facilities:  [place] DENIALS (Administrative/Medical Necessity) [phone] [facility] [facility] [phone]   [address] [place] [address] Drive [phone]   [facility] [address] [facility] [phone] [id number] [place] [address] [phone]   [address] [address] [phone]

## 2023-08-22 NOTE — PLAN OF CARE
Problem: PAIN - ADULT  Goal: Verbalizes/displays adequate comfort level or baseline comfort level  Description: Interventions:  - Encourage patient to monitor pain and request assistance  - Assess pain using appropriate pain scale  - Administer analgesics based on type and severity of pain and evaluate response  - Implement non-pharmacological measures as appropriate and evaluate response  - Consider cultural and social influences on pain and pain management  - Notify physician/advanced practitioner if interventions unsuccessful or patient reports new pain  Outcome: Progressing     Problem: INFECTION - ADULT  Goal: Absence or prevention of progression during hospitalization  Description: INTERVENTIONS:  - Assess and monitor for signs and symptoms of infection  - Monitor lab/diagnostic results  - Monitor all insertion sites, i.e. indwelling lines, tubes, and drains  - Monitor endotracheal if appropriate and nasal secretions for changes in amount and color  - [place] appropriate cooling/warming therapies per order  - Administer medications as ordered  - Instruct and encourage patient and family to use good hand hygiene technique  - Identify and instruct in appropriate isolation precautions for identified infection/condition  Outcome: Progressing

## 2023-08-23 PROCEDURE — 99233 SBSQ HOSP IP/OBS HIGH 50: CPT | Performed by: OBSTETRICS & GYNECOLOGY

## 2023-08-23 RX ORDER — SODIUM CHLORIDE 9 MG/ML
20 INJECTION, SOLUTION INTRAVENOUS ONCE
Status: COMPLETED | OUTPATIENT
Start: 2023-08-23 | End: 2023-08-23

## 2023-08-23 RX ORDER — CALCIUM CARBONATE 500 MG/1
500 TABLET, CHEWABLE ORAL DAILY PRN
Status: DISCONTINUED | OUTPATIENT
Start: 2023-08-23 | End: 2023-08-25 | Stop reason: HOSPADM

## 2023-08-23 RX ADMIN — ONDANSETRON: 2 INJECTION INTRAMUSCULAR; INTRAVENOUS at 11:00

## 2023-08-23 RX ADMIN — CISPLATIN 38.8 MG: 50 INJECTION, SOLUTION INTRAVENOUS at 12:22

## 2023-08-23 RX ADMIN — SODIUM CHLORIDE 500 ML: 0.9 INJECTION, SOLUTION INTRAVENOUS at 10:23

## 2023-08-23 RX ADMIN — ETOPOSIDE 194 MG: 20 INJECTION INTRAVENOUS at 11:21

## 2023-08-23 RX ADMIN — SODIUM CHLORIDE 20 ML/HR: 0.9 INJECTION, SOLUTION INTRAVENOUS at 10:24

## 2023-08-23 RX ADMIN — LORAZEPAM 1 MG: 1 TABLET ORAL at 13:26

## 2023-08-23 RX ADMIN — CALCIUM CARBONATE (ANTACID) CHEW TAB 500 MG 500 MG: 500 CHEW TAB at 10:57

## 2023-08-23 RX ADMIN — SODIUM CHLORIDE 500 ML: 0.9 INJECTION, SOLUTION INTRAVENOUS at 13:25

## 2023-08-23 NOTE — PLAN OF CARE
Problem: PAIN - ADULT  Goal: Verbalizes/displays adequate comfort level or baseline comfort level  Description: Interventions:  - Encourage patient to monitor pain and request assistance  - Assess pain using appropriate pain scale  - Administer analgesics based on type and severity of pain and evaluate response  - Implement non-pharmacological measures as appropriate and evaluate response  - Consider cultural and social influences on pain and pain management  - Notify physician/advanced practitioner if interventions unsuccessful or patient reports new pain  Outcome: Progressing     Problem: INFECTION - ADULT  Goal: Absence or prevention of progression during hospitalization  Description: INTERVENTIONS:  - Assess and monitor for signs and symptoms of infection  - Monitor lab/diagnostic results  - Monitor all insertion sites, i.e. indwelling lines, tubes, and drains  - Monitor endotracheal if appropriate and nasal secretions for changes in amount and color  - [place] appropriate cooling/warming therapies per order  - Administer medications as ordered  - Instruct and encourage patient and family to use good hand hygiene technique  - Identify and instruct in appropriate isolation precautions for identified infection/condition  Outcome: Progressing

## 2023-08-23 NOTE — PLAN OF CARE
Problem: PAIN - ADULT  Goal: Verbalizes/displays adequate comfort level or baseline comfort level  Description: Interventions:  - Encourage patient to monitor pain and request assistance  - Assess pain using appropriate pain scale  - Administer analgesics based on type and severity of pain and evaluate response  - Implement non-pharmacological measures as appropriate and evaluate response  - Consider cultural and social influences on pain and pain management  - Notify physician/advanced practitioner if interventions unsuccessful or patient reports new pain  Outcome: Progressing     Problem: INFECTION - ADULT  Goal: Absence or prevention of progression during hospitalization  Description: INTERVENTIONS:  - Assess and monitor for signs and symptoms of infection  - Monitor lab/diagnostic results  - Monitor all insertion sites, i.e. indwelling lines, tubes, and drains  - Monitor endotracheal if appropriate and nasal secretions for changes in amount and color  - [place] appropriate cooling/warming therapies per order  - Administer medications as ordered  - Instruct and encourage patient and family to use good hand hygiene technique  - Identify and instruct in appropriate isolation precautions for identified infection/condition  Outcome: Progressing

## 2023-08-23 NOTE — PROGRESS NOTES
For questions/concerns on this patient, please reach out to the following:  SLB-OB GYN-GynOnc- Resident/AP  Gyn Oncology Progress note   Trish Negro 27 y.o. female MRN: [id number]  Unit/Bed#: [facility] 915-01 Encounter: [id number]    Assessment/Plan:    27 y. o. who presents for cycle #2 of BEP chemotherapy for her stage IIB ovarian cancer. * Malignant neoplasm of ovary (HCC)  Assessment & Plan  Cycle 2 of Bleomycin days 1, 8 and 15 of a 21-day cycle and etoposide+cisplatin days 1 through 5. 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 Dottie Duck was no sign of any other tumor in the abdomen or other biopsies.  Chest x-ray was negative    Chemotherapy induced neutropenia ([address])  Assessment & Plan  S/p filgrastim on 8/7  WBC 13 on admission, CBC w diff ordered for 8/24         Subjective:    Jennifer P Cleave Lisa has no current complaints. Pain is well controlled. Patient is currently voiding. She is ambulating. Patient is currently passing flatus and has had bowel movement. She is tolerating PO, and denies nausea or vomitting. Patient denies fever, chills, chest pain, shortness of breath, or calf tenderness. Objective:  /85   Pulse 82   Temp (!) 97.3 °F (36.3 °C)   Resp 18   Ht 5' 6" (1.676 m)   Wt 81.7 kg (180 lb 1.9 oz)   LMP 05/15/2023 (Exact Date)   SpO2 96%   BMI 29.07 kg/m²     I/O last 3 completed shifts: In: 3238.2 [P.O.:1000;  I.V.:63.7; IV Piggyback:2174.5]  Out: -   No intake/output data recorded. Lab Results   Component Value Date    WBC 13.15 (H) 08/17/2023    HGB 10.8 (L) 08/17/2023    HCT 34.3 (L) 08/17/2023    MCV 92 08/17/2023     08/17/2023       Lab Results   Component Value Date    CALCIUM 8.9 08/17/2023    K 3.8 08/17/2023    CO2 25 08/17/2023     08/17/2023    BUN 7 08/17/2023    CREATININE 0.77 08/17/2023           Physical Exam  Vitals reviewed. Constitutional:       General: She is not in acute distress. Appearance: She is normal weight. HENT:      Mouth/Throat:      Pharynx: Oropharynx is clear. Eyes:      Extraocular Movements: Extraocular movements intact. Conjunctiva/sclera: Conjunctivae normal.   Cardiovascular:      Rate and Rhythm: Normal rate and regular rhythm. Pulmonary:      Effort: Pulmonary effort is normal. No respiratory distress. Abdominal:      General: Abdomen is flat. There is no distension. Tenderness: There is no abdominal tenderness. Musculoskeletal:         General: Normal range of motion. Skin:     General: Skin is warm and dry. Neurological:      General: No focal deficit present. Mental Status: She is alert and oriented to person, place, and time. Mental status is at baseline.            Leatha Ahmadi MD  8/23/2023  8:20 AM

## 2023-08-24 LAB
ALBUMIN SERPL BCP-MCNC: 3.6 G/DL (ref 3.5–5)
ALP SERPL-CCNC: 70 U/L (ref 34–104)
ALT SERPL W P-5'-P-CCNC: 24 U/L (ref 7–52)
ANION GAP SERPL CALCULATED.3IONS-SCNC: 8 MMOL/L
AST SERPL W P-5'-P-CCNC: 14 U/L (ref 13–39)
BASOPHILS # BLD AUTO: 0.01 THOUSANDS/ÂΜL (ref 0–0.1)
BASOPHILS NFR BLD AUTO: 0 % (ref 0–1)
BILIRUB SERPL-MCNC: 0.19 MG/DL (ref 0.2–1)
BUN SERPL-MCNC: 11 MG/DL (ref 5–25)
CALCIUM SERPL-MCNC: 9 MG/DL (ref 8.4–10.2)
CHLORIDE SERPL-SCNC: 104 MMOL/L (ref 96–108)
CO2 SERPL-SCNC: 26 MMOL/L (ref 21–32)
CREAT SERPL-MCNC: 0.64 MG/DL (ref 0.6–1.3)
EOSINOPHIL # BLD AUTO: 0 THOUSAND/ÂΜL (ref 0–0.61)
EOSINOPHIL NFR BLD AUTO: 0 % (ref 0–6)
ERYTHROCYTE [DISTWIDTH] IN BLOOD BY AUTOMATED COUNT: 14.4 % (ref 11.6–15.1)
GFR SERPL CREATININE-BSD FRML MDRD: 116 ML/MIN/1.73SQ M
GLUCOSE SERPL-MCNC: 84 MG/DL (ref 65–140)
HCT VFR BLD AUTO: 33.7 % (ref 34.8–46.1)
HGB BLD-MCNC: 10.7 G/DL (ref 11.5–15.4)
IMM GRANULOCYTES # BLD AUTO: 0.07 THOUSAND/UL (ref 0–0.2)
IMM GRANULOCYTES NFR BLD AUTO: 1 % (ref 0–2)
LYMPHOCYTES # BLD AUTO: 1.65 THOUSANDS/ÂΜL (ref 0.6–4.47)
LYMPHOCYTES NFR BLD AUTO: 25 % (ref 14–44)
MAGNESIUM SERPL-MCNC: 1.8 MG/DL (ref 1.9–2.7)
MCH RBC QN AUTO: 29.2 PG (ref 26.8–34.3)
MCHC RBC AUTO-ENTMCNC: 31.8 G/DL (ref 31.4–37.4)
MCV RBC AUTO: 92 FL (ref 82–98)
MONOCYTES # BLD AUTO: 0.43 THOUSAND/ÂΜL (ref 0.17–1.22)
MONOCYTES NFR BLD AUTO: 6 % (ref 4–12)
NEUTROPHILS # BLD AUTO: 4.53 THOUSANDS/ÂΜL (ref 1.85–7.62)
NEUTS SEG NFR BLD AUTO: 68 % (ref 43–75)
NRBC BLD AUTO-RTO: 0 /100 WBCS
PHOSPHATE SERPL-MCNC: 3.4 MG/DL (ref 2.7–4.5)
PLATELET # BLD AUTO: 277 THOUSANDS/UL (ref 149–390)
PMV BLD AUTO: 9.8 FL (ref 8.9–12.7)
POTASSIUM SERPL-SCNC: 4.1 MMOL/L (ref 3.5–5.3)
PROT SERPL-MCNC: 6.7 G/DL (ref 6.4–8.4)
RBC # BLD AUTO: 3.67 MILLION/UL (ref 3.81–5.12)
SODIUM SERPL-SCNC: 138 MMOL/L (ref 135–147)
WBC # BLD AUTO: 6.69 THOUSAND/UL (ref 4.31–10.16)

## 2023-08-24 PROCEDURE — 85025 COMPLETE CBC W/AUTO DIFF WBC: CPT

## 2023-08-24 PROCEDURE — 99233 SBSQ HOSP IP/OBS HIGH 50: CPT | Performed by: OBSTETRICS & GYNECOLOGY

## 2023-08-24 PROCEDURE — 80053 COMPREHEN METABOLIC PANEL: CPT

## 2023-08-24 PROCEDURE — 83735 ASSAY OF MAGNESIUM: CPT

## 2023-08-24 PROCEDURE — 84100 ASSAY OF PHOSPHORUS: CPT

## 2023-08-24 RX ORDER — SODIUM CHLORIDE 9 MG/ML
20 INJECTION, SOLUTION INTRAVENOUS ONCE
Status: COMPLETED | OUTPATIENT
Start: 2023-08-24 | End: 2023-08-24

## 2023-08-24 RX ADMIN — SODIUM CHLORIDE 20 ML/HR: 0.9 INJECTION, SOLUTION INTRAVENOUS at 11:25

## 2023-08-24 RX ADMIN — ETOPOSIDE 194 MG: 20 INJECTION INTRAVENOUS at 13:11

## 2023-08-24 RX ADMIN — CISPLATIN 38.8 MG: 50 INJECTION, SOLUTION INTRAVENOUS at 11:39

## 2023-08-24 RX ADMIN — SODIUM CHLORIDE 500 ML: 0.9 INJECTION, SOLUTION INTRAVENOUS at 14:38

## 2023-08-24 RX ADMIN — ONDANSETRON: 2 INJECTION INTRAMUSCULAR; INTRAVENOUS at 11:25

## 2023-08-24 RX ADMIN — SODIUM CHLORIDE 500 ML: 0.9 INJECTION, SOLUTION INTRAVENOUS at 10:24

## 2023-08-24 NOTE — PROGRESS NOTES
For questions/concerns on this patient, please reach out to the following:  SLB-OB GYN-GynOnc- Resident/AP  Gyn Oncology Progress note   Darrell Vickers 27 y.o. female MRN: [id number]  Unit/Bed#: [facility] 915-01 Encounter: [id number]    Assessment/Plan:    27 y. o. who presents for cycle #2 of BEP chemotherapy for her stage IIB ovarian cancer. 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  There was no sign of any other tumor in the abdomen or other biopsies.  Chest x-ray was negative    * Malignant neoplasm of ovary (HCC)  Assessment & Plan  Cycle 2 of Bleomycin days 1, 8 and 15 of a 21-day cycle and etoposide+cisplatin days 1 through 5. Plan for 4 cycles   S/p PFT on 7/27: DLCO corrected for patients hemoglobin level: 109 %, Normal spirometry  Lines:  right chest port   FEN: regular diet  DVT PPX: ambulation, SCDs  Meds ordered PRN for any chemotherapy-related symptoms    Tumor Marker Trends (as of 8/17)  CA-125: 214 -> 25 -> 28  AFP: 303 -> 154         Subjective:    Jennifer Verduzco has no current complaints or issues overnight. She notes that she is having more nausea with the cycle, but has been well controlled with medication. She is currently voiding. She is ambulating. She is tolerating PO, and denies nausea or vomitting. Patient denies fever, chills, chest pain, shortness of breath, or calf tenderness.      Objective:  /84 (BP Location: Right arm)   Pulse 80   Temp 98.2 °F (36.8 °C) (Oral)   Resp 15   Ht 5' 6" (1.676 m)   Wt 81.7 kg (180 lb 1.9 oz)   LMP 05/15/2023 (Exact Date) SpO2 98%   BMI 29.07 kg/m²     I/O last 3 completed shifts: In: 7719 [P.O.:1000; I.V.:124; IV Piggyback:4299]  Out: -   No intake/output data recorded. Lab Results   Component Value Date    WBC 6.69 08/24/2023    HGB 10.7 (L) 08/24/2023    HCT 33.7 (L) 08/24/2023    MCV 92 08/24/2023     08/24/2023       Lab Results   Component Value Date    CALCIUM 9.0 08/24/2023    K 4.1 08/24/2023    CO2 26 08/24/2023     08/24/2023    BUN 11 08/24/2023    CREATININE 0.64 08/24/2023           Physical Exam  Vitals reviewed. Constitutional:       General: She is not in acute distress. Comments: Resting comfortably in bed   Cardiovascular:      Rate and Rhythm: Normal rate. Pulses: Normal pulses. Pulmonary:      Effort: Pulmonary effort is normal. No respiratory distress. Abdominal:      Palpations: Abdomen is soft. Tenderness: There is no abdominal tenderness. Musculoskeletal:         General: Normal range of motion. Skin:     General: Skin is warm and dry. Neurological:      Mental Status: She is alert.    Psychiatric:         Mood and Affect: Mood normal.           Rohit Moody DO  8/24/2023  6:29 AM

## 2023-08-24 NOTE — PLAN OF CARE
Problem: PAIN - ADULT  Goal: Verbalizes/displays adequate comfort level or baseline comfort level  Description: Interventions:  - Encourage patient to monitor pain and request assistance  - Assess pain using appropriate pain scale  - Administer analgesics based on type and severity of pain and evaluate response  - Implement non-pharmacological measures as appropriate and evaluate response  - Consider cultural and social influences on pain and pain management  - Notify physician/advanced practitioner if interventions unsuccessful or patient reports new pain  Outcome: Progressing     Problem: INFECTION - ADULT  Goal: Absence or prevention of progression during hospitalization  Description: INTERVENTIONS:  - Assess and monitor for signs and symptoms of infection  - Monitor lab/diagnostic results  - Monitor all insertion sites, i.e. indwelling lines, tubes, and drains  - Monitor endotracheal if appropriate and nasal secretions for changes in amount and color  - [place] appropriate cooling/warming therapies per order  - Administer medications as ordered  - Instruct and encourage patient and family to use good hand hygiene technique  - Identify and instruct in appropriate isolation precautions for identified infection/condition  Outcome: Progressing

## 2023-08-24 NOTE — CASE MANAGEMENT
Case Management Assessment & Discharge Planning Note    Patient name Refugio Quick  Location [facility] 915/[facility] 856-23 MRN [id number]  : 1990 Date 2023       Current Admission Date: 2023  Current Admission Diagnosis:Malignant neoplasm of ovary [facility])   Patient Active Problem List    Diagnosis Date Noted   • Admission for chemotherapy 2023   • Chemotherapy induced neutropenia ([address]) 2023   • Malignant neoplasm of ovary ([address]) 2023   • Status post total abdominal hysterectomy and bilateral salpingo-oophorectomy (OCHOA-BSO) 2023   • PONV (postoperative nausea and vomiting)    • Pelvic mass 2023   • Hydronephrosis 2023      LOS (days): 3  Geometric Mean LOS (GMLOS) (days):   Days to GMLOS:     OBJECTIVE:  PATIENT READMITTED TO HOSPITAL  Risk of Unplanned Readmission Score: 13.23         Current admission status: Inpatient       Preferred Pharmacy:   [facility], [address] [place] [address]  Phone: [phone] Fax: [address] [place] [address]  Phone: [phone] Fax: [phone]    Primary Care Provider: No primary care provider on file. Primary Insurance: AET[employer]  Secondary Insurance:     ASSESSMENT:  Active Health Care Proxies     Brenna [address] [place] [address] Representative - Friend   Primary Phone: [phone] (Mobile)                         Readmission Root Cause  30 Day Readmission: Yes (readmitted for chemo infusion)    Patient Information  Admitted from[de-identified] Home  Mental Status: Alert  During Assessment patient was accompanied by: Not accompanied during assessment  Assessment information provided by[de-identified] Patient  Primary Caregiver: Self  Support Systems: Friend  Home entry access options.  Select all that apply.: Stairs  Number of steps to enter home.: 4  Do the steps have railings?: Yes  Type of Current Residence: [facility]  Living Arrangements: Lives Alone    Activities of Daily Living Prior to Admission  Functional Status: Independent  Completes ADLs independently?: Yes  Ambulates independently?: Yes  Does patient use assisted devices?: No  Does patient currently own DME?: No  Does patient have a history of Outpatient Therapy (PT/OT)?: No  Does the patient have a history of Short-Term Rehab?: No  Does patient have a history of HHC?: No  Does patient currently have [address] [address] [address] [facility]?: No         Patient Information Continued  Income Source: Employed  Does patient have prescription coverage?: Yes  Does patient have a history of substance abuse?: No  Does patient have a history of Mental Health Diagnosis?: No         Means of Transportation  Means of Transport to Appts[de-identified] Drives Self        DISCHARGE DETAILS:    Discharge planning discussed with[de-identified] patient  Freedom of Choice: Yes     CM contacted family/caregiver?: No- see comments  Were Treatment Team discharge recommendations reviewed with patient/caregiver?: Yes  Did patient/caregiver verbalize understanding of patient care needs?: Yes  Were patient/caregiver advised of the risks associated with not following Treatment Team discharge recommendations?: Yes    Contacts  Patient Contacts: friend Sue Walker  Relationship to Patient[de-identified] Friend  Contact Method: Phone  Phone Number: [phone]  Reason/Outcome: Emergency Contact     Transportation at discharge: friend

## 2023-08-24 NOTE — PLAN OF CARE
Problem: PAIN - ADULT  Goal: Verbalizes/displays adequate comfort level or baseline comfort level  Description: Interventions:  - Encourage patient to monitor pain and request assistance  - Assess pain using appropriate pain scale  - Administer analgesics based on type and severity of pain and evaluate response  - Implement non-pharmacological measures as appropriate and evaluate response  - Consider cultural and social influences on pain and pain management  - Notify physician/advanced practitioner if interventions unsuccessful or patient reports new pain  Outcome: Progressing     Problem: INFECTION - ADULT  Goal: Absence or prevention of progression during hospitalization  Description: INTERVENTIONS:  - Assess and monitor for signs and symptoms of infection  - Monitor lab/diagnostic results  - Monitor all insertion sites, i.e. indwelling lines, tubes, and drains  - Monitor endotracheal if appropriate and nasal secretions for changes in amount and color  - [place] appropriate cooling/warming therapies per order  - Administer medications as ordered  - Instruct and encourage patient and family to use good hand hygiene technique  - Identify and instruct in appropriate isolation precautions for identified infection/condition  Outcome: Progressing

## 2023-08-24 NOTE — UTILIZATION REVIEW
Continued Stay Review    Date: 8/24/2023                        Current Patient Class: inpatient  Current Level of Care: med/surg  HPI:32 y.o. female initially admitted on 8/21 for cycle #2 of BEP chemotherapy for her stage IIIB ovarian cancer    Assessment/Plan: pt states she is having more nausea with the cycle but is well controlled with medication. Voiding ok, ambulating, abram po. Continue chemo as planned: Cycle 2 of Bleomycin days 1, 8 and 15 of a 21-day cycle and etoposide+cisplatin days 1 through 5. Plan for 4 cycles. SCDs. Reg diet. Supportive care.      Vital Signs:   Date/Time Temp Pulse Resp BP MAP (mmHg) SpO2 O2 Device Patient Position - Orthostatic VS   08/24/23 07:56:22 98.5 °F (36.9 °C) -- 14 116/74 88 -- -- --   08/24/23 0430 -- -- -- -- -- 98 % None (Room air) --   08/24/23 03:41:26 98.2 °F (36.8 °C) 80 15 128/84 99 -- -- Lying   08/23/23 22:14:37 97.6 °F (36.4 °C) -- 20 140/93 109 -- -- --   08/23/23 21:51:39 98.9 °F (37.2 °C) 82 18 137/93 108 -- -- Sitting   08/23/23 2100 -- -- -- -- -- 98 % None (Room air) --   08/23/23 15:20:02 99.2 °F (37.3 °C) 80 18 134/87 103 97 % None (Room air) Sitting   08/23/23 11:51:24 97.9 °F (36.6 °C) 82 19 132/86 101 96 % None (Room air) Sitting   08/23/23 08:20:01 98 °F (36.7 °C) 80 18 129/85 100 97 % None (Room air) Sitting   08/23/23 02:46:44 97.3 °F (36.3 °C) Abnormal  -- 18 128/81 97 -- -- --       Pertinent Labs/Diagnostic Results:     Results from last 7 days   Lab Units 08/24/23  0532   WBC Thousand/uL 6.69   HEMOGLOBIN g/dL 10.7*   HEMATOCRIT % 33.7*   PLATELETS Thousands/uL 277   NEUTROS ABS Thousands/µL 4.53     Results from last 7 days   Lab Units 08/24/23  0532   SODIUM mmol/L 138   POTASSIUM mmol/L 4.1   CHLORIDE mmol/L 104   CO2 mmol/L 26   ANION GAP mmol/L 8   BUN mg/dL 11   CREATININE mg/dL 0.64   EGFR ml/min/1.73sq m 116   CALCIUM mg/dL 9.0   MAGNESIUM mg/dL 1.8*   PHOSPHORUS mg/dL 3.4     Results from last 7 days   Lab Units 08/24/23  0532   AST U/L 14   ALT U/L 24   ALK PHOS U/L 70   TOTAL PROTEIN g/dL 6.7   ALBUMIN g/dL 3.6   TOTAL BILIRUBIN mg/dL 0.19*       Results from last 7 days   Lab Units 08/24/23  0532   GLUCOSE RANDOM mg/dL 84         Medications:   Scheduled Medications:  CISplatin (PLATINOL) 38.8 mg in sodium chloride 0.9 % 500 mL infusion, 20 mg/m2 (Treatment Plan Recorded), Intravenous, Once  docusate sodium, 100 mg, Oral, BID  etoposide, 100 mg/m2 (Treatment Plan Recorded), Intravenous, Once  ondansetron (ZOFRAN) 16 mg, dexamethasone (DECADRON) 10 mg in sodium chloride 0.9 % 50 mL IVPB, , Intravenous, Once  sodium chloride, 500 mL, Intravenous, Once  sodium chloride, 500 mL, Intravenous, Once  sodium chloride, 20 mL/hr, Intravenous, Once    PRN Meds:  acetaminophen, 650 mg, Oral, Q6H PRN  alteplase, 2 mg, Intracatheter, Q1MIN PRN  calcium carbonate, 500 mg, Oral, Daily PRN 8/23 x1  LORazepam, 1 mg, Oral, Q6H PRN 8/23 x1  ondansetron, 4 mg, Intravenous, Q6H PRN      Discharge Plan: home following completion of planned chemo    Network Utilization Review Department  ATTENTION: Please call with any questions or concerns to [phone] and carefully listen to the prompts so that you are directed to the right person. All voicemails are confidential.  Dolly Hernandez all requests for admission clinical reviews, approved or denied determinations and any other requests to dedicated fax number below belonging to the campus where the patient is receiving treatment.  List of dedicated fax numbers for the Facilities:  [place] DENIALS (Administrative/Medical Necessity) [phone] [address] [facility] (Maternity/NICU/Pediatrics) [phone]   [address] [place] [address] Drive [phone]   [facility] [phone]   [address] [place] [address] [address] [place] [address] [address] [facility] [address] [facility]  [facility] [phone]

## 2023-08-25 VITALS
WEIGHT: 180.78 LBS | RESPIRATION RATE: 18 BRPM | HEIGHT: 66 IN | TEMPERATURE: 98.4 F | SYSTOLIC BLOOD PRESSURE: 145 MMHG | HEART RATE: 92 BPM | OXYGEN SATURATION: 92 % | DIASTOLIC BLOOD PRESSURE: 104 MMHG | BODY MASS INDEX: 29.05 KG/M2

## 2023-08-25 PROCEDURE — NC001 PR NO CHARGE: Performed by: OBSTETRICS & GYNECOLOGY

## 2023-08-25 PROCEDURE — 99024 POSTOP FOLLOW-UP VISIT: CPT | Performed by: OBSTETRICS & GYNECOLOGY

## 2023-08-25 RX ORDER — SODIUM CHLORIDE 9 MG/ML
20 INJECTION, SOLUTION INTRAVENOUS ONCE
Status: COMPLETED | OUTPATIENT
Start: 2023-08-25 | End: 2023-08-25

## 2023-08-25 RX ADMIN — ETOPOSIDE 194 MG: 20 INJECTION INTRAVENOUS at 12:30

## 2023-08-25 RX ADMIN — DOCUSATE SODIUM 100 MG: 100 CAPSULE, LIQUID FILLED ORAL at 10:41

## 2023-08-25 RX ADMIN — SODIUM CHLORIDE 20 ML/HR: 0.9 INJECTION, SOLUTION INTRAVENOUS at 10:38

## 2023-08-25 RX ADMIN — ONDANSETRON: 2 INJECTION INTRAMUSCULAR; INTRAVENOUS at 10:41

## 2023-08-25 RX ADMIN — SODIUM CHLORIDE 500 ML: 0.9 INJECTION, SOLUTION INTRAVENOUS at 13:55

## 2023-08-25 RX ADMIN — CISPLATIN 38.8 MG: 1 INJECTION, SOLUTION INTRAVENOUS at 11:17

## 2023-08-25 RX ADMIN — SODIUM CHLORIDE 500 ML: 0.9 INJECTION, SOLUTION INTRAVENOUS at 09:35

## 2023-08-25 NOTE — PLAN OF CARE
Problem: PAIN - ADULT  Goal: Verbalizes/displays adequate comfort level or baseline comfort level  Description: Interventions:  - Encourage patient to monitor pain and request assistance  - Assess pain using appropriate pain scale  - Administer analgesics based on type and severity of pain and evaluate response  - Implement non-pharmacological measures as appropriate and evaluate response  - Consider cultural and social influences on pain and pain management  - Notify physician/advanced practitioner if interventions unsuccessful or patient reports new pain  Outcome: Progressing     Problem: INFECTION - ADULT  Goal: Absence or prevention of progression during hospitalization  Description: INTERVENTIONS:  - Assess and monitor for signs and symptoms of infection  - Monitor lab/diagnostic results  - Monitor all insertion sites, i.e. indwelling lines, tubes, and drains  - Monitor endotracheal if appropriate and nasal secretions for changes in amount and color  - [place] appropriate cooling/warming therapies per order  - Administer medications as ordered  - Instruct and encourage patient and family to use good hand hygiene technique  - Identify and instruct in appropriate isolation precautions for identified infection/condition  Outcome: Progressing

## 2023-08-25 NOTE — PROGRESS NOTES
For questions/concerns on this patient, please reach out to the following:  SLB-OB GYN-GynOnc- Resident/AP  Gyn Oncology Progress note   Aguilar Lara 27 y.o. female MRN: [id number]  Unit/Bed#: [facility] 915-01 Encounter: [id number]    Assessment/Plan:  27 y. o. who presents for cycle #2 of BEP chemotherapy for her stage IIB ovarian cancer. 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  There was no sign of any other tumor in the abdomen or other biopsies.  Chest x-ray was negative    * Malignant neoplasm of ovary (HCC)  Assessment & Plan  Cycle 2 of Bleomycin days 1, 8 and 15 of a 21-day cycle and etoposide+cisplatin days 1 through 5. Plan for 4 cycles   S/p PFT on 7/27: DLCO corrected for patients hemoglobin level: 109 %, Normal spirometry  Lines:  right chest port   FEN: regular diet  DVT PPX: ambulation, SCDs  Meds ordered PRN for any chemotherapy-related symptoms    Tumor Marker Trends (as of 8/17)  CA-125: 214 -> 25 -> 28  AFP: 303 -> 154    Follow up AM labs  8/25         Subjective:    Jeninfer P Pinkey Epp has no current complaints. Pain is well controlled. Patient is currently voiding. She is ambulating. She is passing flatus. She is tolerating PO, and denies nausea or vomitting. Patient denies fever, chills, chest pain, shortness of breath, or calf tenderness.      Objective:  /95   Pulse 75   Temp 97.7 °F (36.5 °C)   Resp 18   Ht 5' 6" (1.676 m)   Wt 82 kg (180 lb 12.4 oz)   LMP 05/15/2023 (Exact Date)   SpO2 98%   BMI 29.18 kg/m²     I/O last 3 completed shifts: In: 2184.8 [I.V.:60.3; IV Piggyback:2124.5]  Out: 450 [Urine:450]  No intake/output data recorded. Lab Results   Component Value Date    WBC 6.69 08/24/2023    HGB 10.7 (L) 08/24/2023    HCT 33.7 (L) 08/24/2023    MCV 92 08/24/2023     08/24/2023       Lab Results   Component Value Date    CALCIUM 9.0 08/24/2023    K 4.1 08/24/2023    CO2 26 08/24/2023     08/24/2023    BUN 11 08/24/2023    CREATININE 0.64 08/24/2023           Physical Exam  Constitutional:       General: She is not in acute distress. Comments: Resting comfortably in bed   Cardiovascular:      Rate and Rhythm: Normal rate. Pulmonary:      Effort: Pulmonary effort is normal. No respiratory distress. Abdominal:      Palpations: Abdomen is soft. Tenderness: There is no abdominal tenderness. Skin:     General: Skin is warm and dry. Neurological:      Mental Status: She is alert.    Psychiatric:         Mood and Affect: Mood normal.           Kimmie Diaz DO  8/25/2023  6:21 AM

## 2023-08-25 NOTE — PLAN OF CARE
Problem: PAIN - ADULT  Goal: Verbalizes/displays adequate comfort level or baseline comfort level  Description: Interventions:  - Encourage patient to monitor pain and request assistance  - Assess pain using appropriate pain scale  - Administer analgesics based on type and severity of pain and evaluate response  - Implement non-pharmacological measures as appropriate and evaluate response  - Consider cultural and social influences on pain and pain management  - Notify physician/advanced practitioner if interventions unsuccessful or patient reports new pain  Outcome: Progressing     Problem: INFECTION - ADULT  Goal: Absence or prevention of progression during hospitalization  Description: INTERVENTIONS:  - Assess and monitor for signs and symptoms of infection  - Monitor lab/diagnostic results  - Monitor all insertion sites, i.e. indwelling lines, tubes, and drains  - Monitor endotracheal if appropriate and nasal secretions for changes in amount and color  - [place] appropriate cooling/warming therapies per order  - Administer medications as ordered  - Instruct and encourage patient and family to use good hand hygiene technique  - Identify and instruct in appropriate isolation precautions for identified infection/condition  Outcome: Progressing

## 2023-08-28 ENCOUNTER — PATIENT OUTREACH (OUTPATIENT)
Dept: CASE MANAGEMENT | Facility: HOSPITAL | Age: 33
End: 2023-08-28

## 2023-08-28 ENCOUNTER — HOSPITAL ENCOUNTER (OUTPATIENT)
Dept: INFUSION CENTER | Facility: HOSPITAL | Age: 33
Discharge: HOME | End: 2023-08-28
Payer: COMMERCIAL

## 2023-08-28 VITALS
HEIGHT: 66 IN | BODY MASS INDEX: 29.12 KG/M2 | RESPIRATION RATE: 17 BRPM | DIASTOLIC BLOOD PRESSURE: 80 MMHG | OXYGEN SATURATION: 100 % | WEIGHT: 181.22 LBS | SYSTOLIC BLOOD PRESSURE: 147 MMHG | HEART RATE: 88 BPM | TEMPERATURE: 98.2 F

## 2023-08-28 DIAGNOSIS — Z51.11 ADMISSION FOR CHEMOTHERAPY: ICD-10-CM

## 2023-08-28 DIAGNOSIS — D70.1 CHEMOTHERAPY INDUCED NEUTROPENIA (HCC): Primary | ICD-10-CM

## 2023-08-28 DIAGNOSIS — C56.9 MALIGNANT NEOPLASM OF OVARY, UNSPECIFIED LATERALITY (HCC): ICD-10-CM

## 2023-08-28 DIAGNOSIS — T45.1X5A CHEMOTHERAPY INDUCED NEUTROPENIA (HCC): Primary | ICD-10-CM

## 2023-08-28 PROCEDURE — 96367 TX/PROPH/DG ADDL SEQ IV INF: CPT

## 2023-08-28 PROCEDURE — 96377 APPLICATON ON-BODY INJECTOR: CPT

## 2023-08-28 PROCEDURE — 96409 CHEMO IV PUSH SNGL DRUG: CPT

## 2023-08-28 RX ORDER — SODIUM CHLORIDE 9 MG/ML
20 INJECTION, SOLUTION INTRAVENOUS ONCE
Status: COMPLETED | OUTPATIENT
Start: 2023-08-28 | End: 2023-08-28

## 2023-08-28 RX ORDER — ACETAMINOPHEN 325 MG/1
650 TABLET ORAL ONCE
Status: COMPLETED | OUTPATIENT
Start: 2023-08-28 | End: 2023-08-28

## 2023-08-28 RX ADMIN — DIPHENHYDRAMINE HYDROCHLORIDE 25 MG: 50 INJECTION, SOLUTION INTRAMUSCULAR; INTRAVENOUS at 12:55

## 2023-08-28 RX ADMIN — ACETAMINOPHEN 650 MG: 325 TABLET, FILM COATED ORAL at 12:54

## 2023-08-28 RX ADMIN — SODIUM CHLORIDE 20 ML/HR: 0.9 INJECTION, SOLUTION INTRAVENOUS at 12:24

## 2023-08-28 RX ADMIN — PEGFILGRASTIM 6 MG: KIT SUBCUTANEOUS at 14:25

## 2023-08-28 RX ADMIN — SODIUM CHLORIDE 30 UNITS: 9 INJECTION, SOLUTION INTRAVENOUS at 14:00

## 2023-08-28 NOTE — PLAN OF CARE
Problem: INFECTION - ADULT  Goal: Absence of fever/infection during neutropenic period  Description: INTERVENTIONS:  - Monitor WBC    8/28/2023 1732 by Starr Thorne RN  Outcome: Progressing  8/28/2023 1732 by Starr Thorne RN  Outcome: Progressing

## 2023-08-28 NOTE — PROGRESS NOTES
SERGEI met with patient this afternoon at the [facility]. 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 Patient is now shaved and wears a scarf. She is interested in obtaining a wig. Pt had long think hair prior to treatment. SERGEI provided pt with informatioon on the [facility] [employer] [facility] and [employer]. She will be going to one or both boutiques to see what kind of wig will be right for her. Otherwise, pt is doing okay. She has some nausea and fatigue, but is managing with the help of her friends. Pt has a supportive community with her friends and boyfriend. Patient lives alone and is starting to worry about the bills and rent coming in. She has not worked since early July and is receiving STD, which is much less than her pay. She has asked today if she gathers and emails me some of her bills, if our compassion funds can help. SERGEI confirmed and asked pt to email the bills and will see what we can help with. Patient is also receiving copays she cannot afford. SERGEI will have the [address] mail a financial aid application to pt's house. SERGEI explained the same to pt. Emotional support. Patient has my name, phone number and email. SERGEI will follow up with pt once the email is received with the bills she needs help with.

## 2023-08-31 ENCOUNTER — APPOINTMENT (OUTPATIENT)
Dept: LAB | Facility: HOSPITAL | Age: 33
End: 2023-08-31
Payer: COMMERCIAL

## 2023-09-05 ENCOUNTER — HOSPITAL ENCOUNTER (OUTPATIENT)
Dept: INFUSION CENTER | Facility: HOSPITAL | Age: 33
Discharge: HOME | End: 2023-09-05
Payer: COMMERCIAL

## 2023-09-05 VITALS
RESPIRATION RATE: 17 BRPM | SYSTOLIC BLOOD PRESSURE: 130 MMHG | BODY MASS INDEX: 28.75 KG/M2 | TEMPERATURE: 97.9 F | HEIGHT: 67 IN | DIASTOLIC BLOOD PRESSURE: 80 MMHG | HEART RATE: 83 BPM | WEIGHT: 183.2 LBS

## 2023-09-05 DIAGNOSIS — Z51.11 ADMISSION FOR CHEMOTHERAPY: ICD-10-CM

## 2023-09-05 DIAGNOSIS — C56.9 MALIGNANT NEOPLASM OF OVARY, UNSPECIFIED LATERALITY (HCC): ICD-10-CM

## 2023-09-05 DIAGNOSIS — D70.1 CHEMOTHERAPY INDUCED NEUTROPENIA (HCC): Primary | ICD-10-CM

## 2023-09-05 DIAGNOSIS — T45.1X5A CHEMOTHERAPY INDUCED NEUTROPENIA (HCC): Primary | ICD-10-CM

## 2023-09-05 PROCEDURE — 96367 TX/PROPH/DG ADDL SEQ IV INF: CPT

## 2023-09-05 PROCEDURE — 96409 CHEMO IV PUSH SNGL DRUG: CPT

## 2023-09-05 RX ORDER — SODIUM CHLORIDE 9 MG/ML
20 INJECTION, SOLUTION INTRAVENOUS ONCE
Status: COMPLETED | OUTPATIENT
Start: 2023-09-05 | End: 2023-09-05

## 2023-09-05 RX ORDER — ACETAMINOPHEN 325 MG/1
650 TABLET ORAL ONCE
Status: COMPLETED | OUTPATIENT
Start: 2023-09-05 | End: 2023-09-05

## 2023-09-05 RX ADMIN — BLEOMYCIN SULFATE 30 UNITS: 30 POWDER, FOR SOLUTION INTRAMUSCULAR; INTRAPLEURAL; INTRAVENOUS; SUBCUTANEOUS at 11:22

## 2023-09-05 RX ADMIN — ACETAMINOPHEN 650 MG: 325 TABLET, FILM COATED ORAL at 10:28

## 2023-09-05 RX ADMIN — SODIUM CHLORIDE 20 ML/HR: 0.9 INJECTION, SOLUTION INTRAVENOUS at 10:05

## 2023-09-05 RX ADMIN — DIPHENHYDRAMINE HYDROCHLORIDE 25 MG: 50 INJECTION, SOLUTION INTRAMUSCULAR; INTRAVENOUS at 10:30

## 2023-09-06 ENCOUNTER — TELEMEDICINE (OUTPATIENT)
Dept: GYNECOLOGIC ONCOLOGY | Facility: CLINIC | Age: 33
End: 2023-09-06
Payer: COMMERCIAL

## 2023-09-06 DIAGNOSIS — C56.9 MALIGNANT NEOPLASM OF OVARY, UNSPECIFIED LATERALITY (HCC): Primary | ICD-10-CM

## 2023-09-06 PROCEDURE — 99214 OFFICE O/P EST MOD 30 MIN: CPT | Performed by: NURSE PRACTITIONER

## 2023-09-06 NOTE — PROGRESS NOTES
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 She is s/p 2 cycles of adjuvant chemotherapy with Cisplatin 20 mg/m2 on days 1-5 + etoposide 100mg/m2 on days 1-5 and bleomycin 30 mg IV on days 1, 8, and 15 of a 21 day cycle. She received days 1-5 inpatient and days 8 and 15 outpatient with cycles 1 and 2. Cycles 3 and 4 are planned to be administered as outpatient. She has intermittent nausea as well as fatigue, but overall is tolerating treatment well. Her PS is 0. Patient will proceed with cycle 3 as planned, pending labs. She will return to the office as per her chemotherapy calendar. Reason for visit is pre-chemo eval  Chief Complaint   Patient presents with   • Virtual Regular Visit        Encounter provider ELIF Nance    Provider located at [address] [place] [id number] [phone]      Recent Visits  No visits were found meeting these conditions. Showing recent visits within past 7 days and meeting all other requirements  Today's Visits  Date Type Provider Dept   09/06/23 Telemedicine Donita Briscoe, [facility] today's visits and meeting all other requirements  Future Appointments  No visits were found meeting these conditions. Showing future appointments within next 150 days and meeting all other requirements       The patient was identified by name and date of birth.  Daisha Marc was informed that this is a telemedicine visit and that the visit is being conducted through the [employer]. She agrees to proceed. Visit was then switched to telephone visit as the patient had poor connection. My office door was closed. No one else was in the room. She acknowledged consent and understanding of privacy and security of the video platform. The patient has agreed to participate and understands they can discontinue the visit at any time. Patient is aware this is a billable service. Crista Mayes is a 27 y.o. female       Lexa Baig is tolerating treatment without significant difficulty. She is fatigued, and taking zofran for nausea when needed. Her appetite is fair. She is voiding and moving her bowels without difficulty. She denies abdominal or pelvic pain. The patient is without vaginal bleeding or discharge. She is ambulatory.          Past Medical History:   Diagnosis Date   • No known health problems    • PONV (postoperative nausea and vomiting)        Past Surgical History:   Procedure Laterality Date   • HYSTERECTOMY N/A 6/13/2023    Procedure: OCHOA/BSO, STAGING radical omentectomy bilateral pelvic lymph node dissection;  Surgeon: Birgit Blanco MD;  Location: [facility];  Service: Gynecology Oncology   • IR PORT PLACEMENT  7/24/2023   • ORIF TIBIA & FIBULA FRACTURES Right    • NC EXPLORATORY LAPAROTOMY CELIOTOMY W/WO BIOPSY SPX N/A 6/13/2023    Procedure: EX LAP;  Surgeon: Birgit Blanco MD;  Location: [facility];  Service: Gynecology Oncology   • URETERAL STENT PLACEMENT Bilateral 6/13/2023    Procedure: Emily Frances; STENT URETERAL;  Surgeon: Anupam Lucas MD;  Location: [facility];  Service: Urology   • WISDOM TOOTH EXTRACTION         Current Outpatient Medications   Medication Sig Dispense Refill   • cholecalciferol (VITAMIN D3) 1,000 units tablet Take 5,000 Units by mouth daily     • docusate sodium (COLACE) 100 mg capsule Take 1 capsule (100 mg total) by mouth 2 (two) times a day 60 capsule 0   • LORazepam (ATIVAN) 1 mg tablet Take 1 tablet (1 mg total) by mouth every 6 (six) hours as needed for anxiety (or nausea) 36 tablet 1   • Multiple Vitamin (multivitamin) tablet Take 1 tablet by mouth daily     • ondansetron (ZOFRAN) 8 mg tablet Take 1 tablet (8 mg total) by mouth every 8 (eight) hours as needed for nausea or vomiting (Patient not taking: Reported on 8/22/2023) 30 tablet 1   • ondansetron (ZOFRAN-ODT) 8 mg disintegrating tablet Take 1 tablet (8 mg total) by mouth every 8 (eight) hours as needed for nausea or vomiting 20 tablet 0     No current facility-administered medications for this visit. Facility-Administered Medications Ordered in Other Visits   Medication Dose Route Frequency Provider Last Rate Last Admin   • alteplase (CATHFLO) injection 2 mg  2 mg Intracatheter Q1MIN PRN Radha Beltran MD            Allergies   Allergen Reactions   • Penicillins Itching and Rash     Rash  Rash       Oncology History   Malignant neoplasm of ovary ([address])   6/13/2023 Surgery    Patient underwent exploratory laparotomy OCHOA/BSO and staging procedure. Intra-Op findings revealed a large left adnexal mass approximately 12 to 14 cm densely adherent to the sigmoid colon. It ruptured releasing chocolate fluid into the abdomen however prior staining of the omentum indicated likely previous rupture. Final pathology report after extensive review by [facility] was most consistent with endometrioid adenocarcinoma initiating within endometriosis within the ovary. Additionally yolk sac tumor was identified which extended to the local:. Making this a stage IIB tumor. Would recommend 4 cycles of bleomycin etoposide and cis-platinum. 7/11/2023 Initial Diagnosis    Malignant neoplasm of left ovary ([address])     7/11/2023 -  Cancer Staged    Staging form: Ovary, Fallopian Tube, Primary Peritoneal, AJCC 8th Edition  - Clinical stage from 7/11/2023: Stage IIB (cT2b, cN0, cM0) - Signed by Radha Beltran MD on 7/11/2023  Histopathologic type:  Yolk sac tumor  Stage prefix: Initial diagnosis       7/31/2023 -  Chemotherapy    alteplase (CATHFLO), 2 mg, Intracatheter, Every 1 Minute as needed, 2 of 4 cycles  pegfilgrastim ([place]), 6 mg, Subcutaneous, Once, 2 of 4 cycles  Administration: 6 mg (8/7/2023), 6 mg (8/28/2023)  bleomycin (BLENOXANE) IVPB, 30 Units, Intravenous, Once, 2 of 4 cycles  Administration: 30 Units (7/31/2023), 30 Units (8/7/2023), 30 Units (8/14/2023), 30 Units (8/21/2023), 30 Units (8/28/2023), 30 Units (9/5/2023)  CISplatin (PLATINOL) infusion, 20 mg/m2 = 38.8 mg, Intravenous, Once, 2 of 4 cycles  Administration: 38.8 mg (7/31/2023), 38.8 mg (8/1/2023), 38.8 mg (8/21/2023), 38.8 mg (8/2/2023), 38.8 mg (8/3/2023), 38.8 mg (8/4/2023), 38.8 mg (8/22/2023), 38.8 mg (8/23/2023), 38.8 mg (8/24/2023), 38.8 mg (8/25/2023)  fosaprepitant (EMEND) IVPB, 150 mg, Intravenous, Once, 2 of 4 cycles  Administration: 150 mg (7/31/2023), 150 mg (8/21/2023)  etoposide (TOPOSAR), 100 mg/m2 = 194 mg, Intravenous, Once, 2 of 4 cycles  Administration: 194 mg (7/31/2023), 194 mg (8/1/2023), 194 mg (8/21/2023), 194 mg (8/2/2023), 194 mg (8/3/2023), 194 mg (8/4/2023), 194 mg (8/22/2023), 194 mg (8/23/2023), 194 mg (8/24/2023), 194 mg (8/25/2023)           Review of Systems   Constitutional: Positive for fatigue. Negative for activity change, appetite change, chills, fever and unexpected weight change. HENT: Negative for mouth sores. Eyes: Negative. Respiratory: Negative for cough, chest tightness, shortness of breath and wheezing. Cardiovascular: Negative for chest pain, palpitations and leg swelling. Gastrointestinal: Positive for nausea. Negative for abdominal distention, abdominal pain, anal bleeding, blood in stool, constipation, diarrhea and vomiting. Endocrine: Negative. Genitourinary: Negative for difficulty urinating, dysuria, flank pain, frequency, hematuria, pelvic pain, urgency, vaginal bleeding, vaginal discharge and vaginal pain. Musculoskeletal: Negative for arthralgias and myalgias. Skin: Negative for color change, pallor and rash. Neurological: Negative for dizziness, weakness, numbness and headaches. Hematological: Negative. Psychiatric/Behavioral: The patient is not nervous/anxious. Video Exam    There were no vitals filed for this visit. Physical Exam  Constitutional:       General: She is not in acute distress. Appearance: Normal appearance. She is not ill-appearing. HENT:      Head: Normocephalic and atraumatic. Pulmonary:      Effort: Pulmonary effort is normal.   Skin:     Coloration: Skin is not jaundiced. Findings: No rash. Neurological:      General: No focal deficit present. Mental Status: She is alert and oriented to person, place, and time. Motor: No weakness. Psychiatric:         Mood and Affect: Mood normal.         Behavior: Behavior normal.         Thought Content:  Thought content normal.         Judgment: Judgment normal.          Visit Time  Total Visit Duration: 15 min

## 2023-09-06 NOTE — ASSESSMENT & PLAN NOTE
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 She is s/p 2 cycles of adjuvant chemotherapy with Cisplatin 20 mg/m2 on days 1-5 + etoposide 100mg/m2 on days 1-5 and bleomycin 30 mg IV on days 1, 8, and 15 of a 21 day cycle. She received days 1-5 inpatient and days 8 and 15 outpatient with cycles 1 and 2. Cycles 3 and 4 are planned to be administered as outpatient. She has intermittent nausea as well as fatigue, but overall is tolerating treatment well. Her PS is 0. Patient will proceed with cycle 3 as planned, pending labs. She will return to the office as per her chemotherapy calendar.

## 2023-09-07 ENCOUNTER — APPOINTMENT (OUTPATIENT)
Dept: LAB | Facility: HOSPITAL | Age: 33
End: 2023-09-07
Payer: COMMERCIAL

## 2023-09-07 DIAGNOSIS — C56.9 MALIGNANT NEOPLASM OF OVARY, UNSPECIFIED LATERALITY (HCC): Primary | ICD-10-CM

## 2023-09-07 DIAGNOSIS — T45.1X5A ALOPECIA DUE TO CYTOTOXIC DRUG: ICD-10-CM

## 2023-09-07 DIAGNOSIS — L65.8 ALOPECIA DUE TO CYTOTOXIC DRUG: ICD-10-CM

## 2023-09-07 PROCEDURE — 80053 COMPREHEN METABOLIC PANEL: CPT

## 2023-09-07 PROCEDURE — 83735 ASSAY OF MAGNESIUM: CPT

## 2023-09-07 PROCEDURE — 85027 COMPLETE CBC AUTOMATED: CPT

## 2023-09-07 PROCEDURE — 86304 IMMUNOASSAY TUMOR CA 125: CPT

## 2023-09-07 PROCEDURE — 85007 BL SMEAR W/DIFF WBC COUNT: CPT

## 2023-09-07 PROCEDURE — 82105 ALPHA-FETOPROTEIN SERUM: CPT

## 2023-09-11 ENCOUNTER — HOSPITAL ENCOUNTER (OUTPATIENT)
Dept: INFUSION CENTER | Facility: HOSPITAL | Age: 33
Discharge: HOME | End: 2023-09-11
Attending: OBSTETRICS & GYNECOLOGY

## 2023-09-13 ENCOUNTER — TELEPHONE (OUTPATIENT)
Dept: GYNECOLOGIC ONCOLOGY | Facility: CLINIC | Age: 33
End: 2023-09-13

## 2023-09-13 ENCOUNTER — HOSPITAL ENCOUNTER (OUTPATIENT)
Dept: INFUSION CENTER | Facility: HOSPITAL | Age: 33
Discharge: HOME | End: 2023-09-13
Attending: OBSTETRICS & GYNECOLOGY

## 2023-09-14 ENCOUNTER — APPOINTMENT (OUTPATIENT)
Dept: LAB | Facility: CLINIC | Age: 33
End: 2023-09-14
Payer: COMMERCIAL

## 2023-09-14 DIAGNOSIS — C56.2 MALIGNANT NEOPLASM OF LEFT OVARY (HCC): ICD-10-CM

## 2023-09-14 LAB
AFP-TM SERPL-MCNC: 7.07 NG/ML (ref 0–9)
CANCER AG125 SERPL-ACNC: 10.4 U/ML (ref 0–35)

## 2023-09-14 PROCEDURE — 83735 ASSAY OF MAGNESIUM: CPT

## 2023-09-14 PROCEDURE — 85007 BL SMEAR W/DIFF WBC COUNT: CPT

## 2023-09-14 PROCEDURE — 86304 IMMUNOASSAY TUMOR CA 125: CPT

## 2023-09-14 PROCEDURE — 85027 COMPLETE CBC AUTOMATED: CPT

## 2023-09-14 PROCEDURE — 80053 COMPREHEN METABOLIC PANEL: CPT

## 2023-09-14 PROCEDURE — 82105 ALPHA-FETOPROTEIN SERUM: CPT

## 2023-09-15 RX ORDER — SODIUM CHLORIDE 9 MG/ML
20 INJECTION, SOLUTION INTRAVENOUS ONCE
Status: CANCELLED | OUTPATIENT
Start: 2023-09-18

## 2023-09-15 RX ORDER — ACETAMINOPHEN 325 MG/1
650 TABLET ORAL ONCE
Status: CANCELLED | OUTPATIENT
Start: 2023-09-18

## 2023-09-18 ENCOUNTER — HOSPITAL ENCOUNTER (OUTPATIENT)
Dept: INFUSION CENTER | Facility: HOSPITAL | Age: 33
Discharge: HOME | End: 2023-09-18
Attending: OBSTETRICS & GYNECOLOGY
Payer: COMMERCIAL

## 2023-09-18 VITALS
BODY MASS INDEX: 28.72 KG/M2 | WEIGHT: 182.98 LBS | TEMPERATURE: 97 F | DIASTOLIC BLOOD PRESSURE: 77 MMHG | HEIGHT: 67 IN | HEART RATE: 91 BPM | SYSTOLIC BLOOD PRESSURE: 132 MMHG | RESPIRATION RATE: 16 BRPM

## 2023-09-18 DIAGNOSIS — Z51.11 ADMISSION FOR CHEMOTHERAPY: ICD-10-CM

## 2023-09-18 DIAGNOSIS — C56.9 MALIGNANT NEOPLASM OF OVARY, UNSPECIFIED LATERALITY (HCC): ICD-10-CM

## 2023-09-18 DIAGNOSIS — D70.1 CHEMOTHERAPY INDUCED NEUTROPENIA: Primary | ICD-10-CM

## 2023-09-18 DIAGNOSIS — T45.1X5A CHEMOTHERAPY INDUCED NEUTROPENIA: Primary | ICD-10-CM

## 2023-09-18 PROCEDURE — 96417 CHEMO IV INFUS EACH ADDL SEQ: CPT

## 2023-09-18 PROCEDURE — 96413 CHEMO IV INFUSION 1 HR: CPT

## 2023-09-18 PROCEDURE — 96361 HYDRATE IV INFUSION ADD-ON: CPT

## 2023-09-18 PROCEDURE — 96367 TX/PROPH/DG ADDL SEQ IV INF: CPT

## 2023-09-18 RX ORDER — SODIUM CHLORIDE 9 MG/ML
20 INJECTION, SOLUTION INTRAVENOUS ONCE
Status: COMPLETED | OUTPATIENT
Start: 2023-09-18 | End: 2023-09-18

## 2023-09-18 RX ORDER — ACETAMINOPHEN 325 MG/1
650 TABLET ORAL ONCE
Status: COMPLETED | OUTPATIENT
Start: 2023-09-18 | End: 2023-09-18

## 2023-09-18 RX ADMIN — SODIUM CHLORIDE 500 ML: 0.9 INJECTION, SOLUTION INTRAVENOUS at 08:26

## 2023-09-18 RX ADMIN — SODIUM CHLORIDE 20 ML/HR: 0.9 INJECTION, SOLUTION INTRAVENOUS at 08:25

## 2023-09-18 RX ADMIN — ACETAMINOPHEN 650 MG: 325 TABLET, FILM COATED ORAL at 08:36

## 2023-09-18 RX ADMIN — DEXAMETHASONE SODIUM PHOSPHATE: 10 INJECTION, SOLUTION INTRAMUSCULAR; INTRAVENOUS at 08:30

## 2023-09-18 RX ADMIN — DIPHENHYDRAMINE HYDROCHLORIDE 25 MG: 50 INJECTION, SOLUTION INTRAMUSCULAR; INTRAVENOUS at 09:34

## 2023-09-18 RX ADMIN — CISPLATIN 39 MG: 1 INJECTION, SOLUTION INTRAVENOUS at 09:58

## 2023-09-18 RX ADMIN — FOSAPREPITANT 150 MG: 150 INJECTION, POWDER, LYOPHILIZED, FOR SOLUTION INTRAVENOUS at 09:03

## 2023-09-18 RX ADMIN — ETOPOSIDE 200 MG: 20 INJECTION, SOLUTION INTRAVENOUS at 11:07

## 2023-09-18 RX ADMIN — SODIUM CHLORIDE 500 ML: 0.9 INJECTION, SOLUTION INTRAVENOUS at 12:55

## 2023-09-18 RX ADMIN — BLEOMYCIN SULFATE 30 UNITS: 30 POWDER, FOR SOLUTION INTRAMUSCULAR; INTRAPLEURAL; INTRAVENOUS; SUBCUTANEOUS at 12:31

## 2023-09-19 ENCOUNTER — HOSPITAL ENCOUNTER (OUTPATIENT)
Dept: INFUSION CENTER | Facility: HOSPITAL | Age: 33
Discharge: HOME | End: 2023-09-19
Attending: OBSTETRICS & GYNECOLOGY
Payer: COMMERCIAL

## 2023-09-19 VITALS
WEIGHT: 183.86 LBS | HEIGHT: 67 IN | DIASTOLIC BLOOD PRESSURE: 78 MMHG | HEART RATE: 100 BPM | TEMPERATURE: 96.6 F | RESPIRATION RATE: 16 BRPM | SYSTOLIC BLOOD PRESSURE: 139 MMHG | BODY MASS INDEX: 28.86 KG/M2

## 2023-09-19 DIAGNOSIS — D70.1 CHEMOTHERAPY INDUCED NEUTROPENIA: Primary | ICD-10-CM

## 2023-09-19 DIAGNOSIS — Z51.11 ADMISSION FOR CHEMOTHERAPY: ICD-10-CM

## 2023-09-19 DIAGNOSIS — T45.1X5A CHEMOTHERAPY INDUCED NEUTROPENIA: Primary | ICD-10-CM

## 2023-09-19 DIAGNOSIS — C56.9 MALIGNANT NEOPLASM OF OVARY, UNSPECIFIED LATERALITY (HCC): ICD-10-CM

## 2023-09-19 PROCEDURE — 96367 TX/PROPH/DG ADDL SEQ IV INF: CPT

## 2023-09-19 PROCEDURE — 96361 HYDRATE IV INFUSION ADD-ON: CPT

## 2023-09-19 PROCEDURE — 96417 CHEMO IV INFUS EACH ADDL SEQ: CPT

## 2023-09-19 PROCEDURE — 96413 CHEMO IV INFUSION 1 HR: CPT

## 2023-09-19 RX ORDER — ACETAMINOPHEN 325 MG/1
650 TABLET ORAL ONCE
Status: DISCONTINUED | OUTPATIENT
Start: 2023-09-19 | End: 2023-09-22 | Stop reason: HOSPADM

## 2023-09-19 RX ORDER — SODIUM CHLORIDE 9 MG/ML
20 INJECTION, SOLUTION INTRAVENOUS ONCE
Status: COMPLETED | OUTPATIENT
Start: 2023-09-19 | End: 2023-09-19

## 2023-09-19 RX ADMIN — SODIUM CHLORIDE 500 ML: 0.9 INJECTION, SOLUTION INTRAVENOUS at 11:35

## 2023-09-19 RX ADMIN — ETOPOSIDE 200 MG: 20 INJECTION, SOLUTION INTRAVENOUS at 10:51

## 2023-09-19 RX ADMIN — CISPLATIN 39 MG: 1 INJECTION, SOLUTION INTRAVENOUS at 09:43

## 2023-09-19 RX ADMIN — DEXAMETHASONE SODIUM PHOSPHATE: 10 INJECTION, SOLUTION INTRAMUSCULAR; INTRAVENOUS at 08:55

## 2023-09-19 RX ADMIN — SODIUM CHLORIDE 20 ML/HR: 0.9 INJECTION, SOLUTION INTRAVENOUS at 08:52

## 2023-09-19 RX ADMIN — SODIUM CHLORIDE 500 ML: 0.9 INJECTION, SOLUTION INTRAVENOUS at 08:25

## 2023-09-20 ENCOUNTER — HOSPITAL ENCOUNTER (OUTPATIENT)
Dept: INFUSION CENTER | Facility: HOSPITAL | Age: 33
Discharge: HOME | End: 2023-09-20
Attending: OBSTETRICS & GYNECOLOGY
Payer: COMMERCIAL

## 2023-09-20 VITALS
RESPIRATION RATE: 18 BRPM | OXYGEN SATURATION: 97 % | HEART RATE: 98 BPM | WEIGHT: 183.86 LBS | BODY MASS INDEX: 28.86 KG/M2 | DIASTOLIC BLOOD PRESSURE: 80 MMHG | HEIGHT: 67 IN | SYSTOLIC BLOOD PRESSURE: 135 MMHG | TEMPERATURE: 97.2 F

## 2023-09-20 DIAGNOSIS — C56.9 MALIGNANT NEOPLASM OF OVARY, UNSPECIFIED LATERALITY (HCC): ICD-10-CM

## 2023-09-20 DIAGNOSIS — T45.1X5A CHEMOTHERAPY INDUCED NEUTROPENIA: Primary | ICD-10-CM

## 2023-09-20 DIAGNOSIS — Z51.11 ADMISSION FOR CHEMOTHERAPY: ICD-10-CM

## 2023-09-20 DIAGNOSIS — D70.1 CHEMOTHERAPY INDUCED NEUTROPENIA: Primary | ICD-10-CM

## 2023-09-20 PROCEDURE — 96361 HYDRATE IV INFUSION ADD-ON: CPT

## 2023-09-20 PROCEDURE — 96417 CHEMO IV INFUS EACH ADDL SEQ: CPT

## 2023-09-20 PROCEDURE — 96413 CHEMO IV INFUSION 1 HR: CPT

## 2023-09-20 PROCEDURE — 96367 TX/PROPH/DG ADDL SEQ IV INF: CPT

## 2023-09-20 RX ORDER — SODIUM CHLORIDE 9 MG/ML
20 INJECTION, SOLUTION INTRAVENOUS ONCE
Status: COMPLETED | OUTPATIENT
Start: 2023-09-20 | End: 2023-09-20

## 2023-09-20 RX ADMIN — SODIUM CHLORIDE 500 ML: 0.9 INJECTION, SOLUTION INTRAVENOUS at 08:06

## 2023-09-20 RX ADMIN — CISPLATIN 39 MG: 50 INJECTION, SOLUTION INTRAVENOUS at 09:30

## 2023-09-20 RX ADMIN — DEXAMETHASONE SODIUM PHOSPHATE: 10 INJECTION, SOLUTION INTRAMUSCULAR; INTRAVENOUS at 08:12

## 2023-09-20 RX ADMIN — SODIUM CHLORIDE 500 ML: 0.9 INJECTION, SOLUTION INTRAVENOUS at 11:30

## 2023-09-20 RX ADMIN — SODIUM CHLORIDE 20 ML/HR: 0.9 INJECTION, SOLUTION INTRAVENOUS at 08:09

## 2023-09-20 RX ADMIN — ETOPOSIDE 200 MG: 20 INJECTION, SOLUTION INTRAVENOUS at 10:38

## 2023-09-21 ENCOUNTER — HOSPITAL ENCOUNTER (OUTPATIENT)
Dept: INFUSION CENTER | Facility: HOSPITAL | Age: 33
Discharge: HOME | End: 2023-09-21
Attending: OBSTETRICS & GYNECOLOGY
Payer: COMMERCIAL

## 2023-09-21 VITALS
SYSTOLIC BLOOD PRESSURE: 120 MMHG | RESPIRATION RATE: 16 BRPM | TEMPERATURE: 97.3 F | BODY MASS INDEX: 28.86 KG/M2 | HEIGHT: 67 IN | HEART RATE: 86 BPM | DIASTOLIC BLOOD PRESSURE: 67 MMHG | WEIGHT: 183.86 LBS | OXYGEN SATURATION: 98 %

## 2023-09-21 DIAGNOSIS — C56.2 MALIGNANT NEOPLASM OF LEFT OVARY (HCC): ICD-10-CM

## 2023-09-21 DIAGNOSIS — D70.1 CHEMOTHERAPY INDUCED NEUTROPENIA: Primary | ICD-10-CM

## 2023-09-21 DIAGNOSIS — C56.9 MALIGNANT NEOPLASM OF OVARY, UNSPECIFIED LATERALITY (HCC): ICD-10-CM

## 2023-09-21 DIAGNOSIS — T45.1X5A CHEMOTHERAPY INDUCED NEUTROPENIA: Primary | ICD-10-CM

## 2023-09-21 DIAGNOSIS — Z51.11 ADMISSION FOR CHEMOTHERAPY: ICD-10-CM

## 2023-09-21 LAB
ALBUMIN SERPL BCP-MCNC: 4.1 G/DL (ref 3.5–5)
ALP SERPL-CCNC: 53 U/L (ref 34–104)
ALT SERPL W P-5'-P-CCNC: 25 U/L (ref 7–52)
ANION GAP SERPL CALCULATED.3IONS-SCNC: 11 MMOL/L
AST SERPL W P-5'-P-CCNC: 17 U/L (ref 13–39)
BASOPHILS # BLD AUTO: 0.01 THOUSANDS/ÂΜL (ref 0–0.1)
BASOPHILS NFR BLD AUTO: 0 % (ref 0–1)
BILIRUB SERPL-MCNC: 0.46 MG/DL (ref 0.2–1)
BUN SERPL-MCNC: 14 MG/DL (ref 5–25)
CALCIUM SERPL-MCNC: 8.9 MG/DL (ref 8.4–10.2)
CHLORIDE SERPL-SCNC: 102 MMOL/L (ref 96–108)
CO2 SERPL-SCNC: 24 MMOL/L (ref 21–32)
CREAT SERPL-MCNC: 0.7 MG/DL (ref 0.6–1.3)
EOSINOPHIL # BLD AUTO: 0 THOUSAND/ÂΜL (ref 0–0.61)
EOSINOPHIL NFR BLD AUTO: 0 % (ref 0–6)
ERYTHROCYTE [DISTWIDTH] IN BLOOD BY AUTOMATED COUNT: 16.3 % (ref 11.6–15.1)
GFR SERPL CREATININE-BSD FRML MDRD: 113 ML/MIN/1.73SQ M
GLUCOSE SERPL-MCNC: 76 MG/DL (ref 65–140)
HCT VFR BLD AUTO: 32.2 % (ref 34.8–46.1)
HGB BLD-MCNC: 10.4 G/DL (ref 11.5–15.4)
IMM GRANULOCYTES # BLD AUTO: 0.06 THOUSAND/UL (ref 0–0.2)
IMM GRANULOCYTES NFR BLD AUTO: 1 % (ref 0–2)
LYMPHOCYTES # BLD AUTO: 1.51 THOUSANDS/ÂΜL (ref 0.6–4.47)
LYMPHOCYTES NFR BLD AUTO: 18 % (ref 14–44)
MAGNESIUM SERPL-MCNC: 1.6 MG/DL (ref 1.9–2.7)
MCH RBC QN AUTO: 29.6 PG (ref 26.8–34.3)
MCHC RBC AUTO-ENTMCNC: 32.3 G/DL (ref 31.4–37.4)
MCV RBC AUTO: 92 FL (ref 82–98)
MONOCYTES # BLD AUTO: 0.46 THOUSAND/ÂΜL (ref 0.17–1.22)
MONOCYTES NFR BLD AUTO: 6 % (ref 4–12)
NEUTROPHILS # BLD AUTO: 6.15 THOUSANDS/ÂΜL (ref 1.85–7.62)
NEUTS SEG NFR BLD AUTO: 75 % (ref 43–75)
NRBC BLD AUTO-RTO: 0 /100 WBCS
PLATELET # BLD AUTO: 358 THOUSANDS/UL (ref 149–390)
PMV BLD AUTO: 10 FL (ref 8.9–12.7)
POTASSIUM SERPL-SCNC: 3.2 MMOL/L (ref 3.5–5.3)
PROT SERPL-MCNC: 6.2 G/DL (ref 6.4–8.4)
RBC # BLD AUTO: 3.51 MILLION/UL (ref 3.81–5.12)
SODIUM SERPL-SCNC: 137 MMOL/L (ref 135–147)
WBC # BLD AUTO: 8.19 THOUSAND/UL (ref 4.31–10.16)

## 2023-09-21 PROCEDURE — 96417 CHEMO IV INFUS EACH ADDL SEQ: CPT

## 2023-09-21 PROCEDURE — 80053 COMPREHEN METABOLIC PANEL: CPT

## 2023-09-21 PROCEDURE — 85025 COMPLETE CBC W/AUTO DIFF WBC: CPT

## 2023-09-21 PROCEDURE — 96413 CHEMO IV INFUSION 1 HR: CPT

## 2023-09-21 PROCEDURE — 96361 HYDRATE IV INFUSION ADD-ON: CPT

## 2023-09-21 PROCEDURE — 96367 TX/PROPH/DG ADDL SEQ IV INF: CPT

## 2023-09-21 PROCEDURE — 83735 ASSAY OF MAGNESIUM: CPT

## 2023-09-21 RX ORDER — SODIUM CHLORIDE 9 MG/ML
20 INJECTION, SOLUTION INTRAVENOUS ONCE
Status: COMPLETED | OUTPATIENT
Start: 2023-09-21 | End: 2023-09-21

## 2023-09-21 RX ADMIN — SODIUM CHLORIDE 500 ML: 0.9 INJECTION, SOLUTION INTRAVENOUS at 09:21

## 2023-09-21 RX ADMIN — CISPLATIN 39 MG: 50 INJECTION, SOLUTION INTRAVENOUS at 10:28

## 2023-09-21 RX ADMIN — SODIUM CHLORIDE 20 ML/HR: 0.9 INJECTION, SOLUTION INTRAVENOUS at 09:22

## 2023-09-21 RX ADMIN — SODIUM CHLORIDE 500 ML: 0.9 INJECTION, SOLUTION INTRAVENOUS at 12:49

## 2023-09-21 RX ADMIN — DEXAMETHASONE SODIUM PHOSPHATE: 10 INJECTION, SOLUTION INTRAMUSCULAR; INTRAVENOUS at 09:22

## 2023-09-21 RX ADMIN — ETOPOSIDE 200 MG: 20 INJECTION, SOLUTION INTRAVENOUS at 11:35

## 2023-09-22 ENCOUNTER — HOSPITAL ENCOUNTER (OUTPATIENT)
Dept: INFUSION CENTER | Facility: HOSPITAL | Age: 33
End: 2023-09-22
Attending: OBSTETRICS & GYNECOLOGY
Payer: COMMERCIAL

## 2023-09-22 VITALS
BODY MASS INDEX: 28.86 KG/M2 | OXYGEN SATURATION: 91 % | SYSTOLIC BLOOD PRESSURE: 144 MMHG | HEIGHT: 67 IN | HEART RATE: 115 BPM | RESPIRATION RATE: 18 BRPM | WEIGHT: 183.86 LBS | DIASTOLIC BLOOD PRESSURE: 85 MMHG | TEMPERATURE: 97.4 F

## 2023-09-22 DIAGNOSIS — T45.1X5A CHEMOTHERAPY INDUCED NEUTROPENIA: ICD-10-CM

## 2023-09-22 DIAGNOSIS — C56.9 MALIGNANT NEOPLASM OF OVARY, UNSPECIFIED LATERALITY (HCC): ICD-10-CM

## 2023-09-22 DIAGNOSIS — Z51.11 ADMISSION FOR CHEMOTHERAPY: ICD-10-CM

## 2023-09-22 DIAGNOSIS — T45.1X5A CHEMOTHERAPY INDUCED NEUTROPENIA: Primary | ICD-10-CM

## 2023-09-22 DIAGNOSIS — D70.1 CHEMOTHERAPY INDUCED NEUTROPENIA: Primary | ICD-10-CM

## 2023-09-22 DIAGNOSIS — D70.1 CHEMOTHERAPY INDUCED NEUTROPENIA: ICD-10-CM

## 2023-09-22 PROCEDURE — 96367 TX/PROPH/DG ADDL SEQ IV INF: CPT

## 2023-09-22 PROCEDURE — 96361 HYDRATE IV INFUSION ADD-ON: CPT

## 2023-09-22 PROCEDURE — 96413 CHEMO IV INFUSION 1 HR: CPT

## 2023-09-22 PROCEDURE — 96417 CHEMO IV INFUS EACH ADDL SEQ: CPT

## 2023-09-22 RX ORDER — SODIUM CHLORIDE 9 MG/ML
20 INJECTION, SOLUTION INTRAVENOUS ONCE
Status: COMPLETED | OUTPATIENT
Start: 2023-09-22 | End: 2023-09-22

## 2023-09-22 RX ADMIN — SODIUM CHLORIDE 500 ML: 0.9 INJECTION, SOLUTION INTRAVENOUS at 12:10

## 2023-09-22 RX ADMIN — SODIUM CHLORIDE 500 ML: 0.9 INJECTION, SOLUTION INTRAVENOUS at 08:42

## 2023-09-22 RX ADMIN — CISPLATIN 39 MG: 50 INJECTION, SOLUTION INTRAVENOUS at 09:55

## 2023-09-22 RX ADMIN — ETOPOSIDE 200 MG: 20 INJECTION, SOLUTION INTRAVENOUS at 11:01

## 2023-09-22 RX ADMIN — SODIUM CHLORIDE 20 ML/HR: 0.9 INJECTION, SOLUTION INTRAVENOUS at 08:43

## 2023-09-22 RX ADMIN — DEXAMETHASONE SODIUM PHOSPHATE: 10 INJECTION, SOLUTION INTRAMUSCULAR; INTRAVENOUS at 08:43

## 2023-09-25 ENCOUNTER — HOSPITAL ENCOUNTER (OUTPATIENT)
Dept: INFUSION CENTER | Facility: HOSPITAL | Age: 33
Discharge: HOME | End: 2023-09-25
Payer: COMMERCIAL

## 2023-09-25 VITALS
HEIGHT: 67 IN | BODY MASS INDEX: 29.48 KG/M2 | TEMPERATURE: 98.6 F | WEIGHT: 187.83 LBS | OXYGEN SATURATION: 95 % | HEART RATE: 86 BPM | SYSTOLIC BLOOD PRESSURE: 123 MMHG | DIASTOLIC BLOOD PRESSURE: 86 MMHG | RESPIRATION RATE: 17 BRPM

## 2023-09-25 DIAGNOSIS — C56.9 MALIGNANT NEOPLASM OF OVARY, UNSPECIFIED LATERALITY (HCC): ICD-10-CM

## 2023-09-25 DIAGNOSIS — T45.1X5A CHEMOTHERAPY INDUCED NEUTROPENIA: Primary | ICD-10-CM

## 2023-09-25 DIAGNOSIS — T45.1X5A CHEMOTHERAPY INDUCED NEUTROPENIA: ICD-10-CM

## 2023-09-25 DIAGNOSIS — D70.1 CHEMOTHERAPY INDUCED NEUTROPENIA: ICD-10-CM

## 2023-09-25 DIAGNOSIS — D70.1 CHEMOTHERAPY INDUCED NEUTROPENIA: Primary | ICD-10-CM

## 2023-09-25 DIAGNOSIS — Z51.11 ADMISSION FOR CHEMOTHERAPY: ICD-10-CM

## 2023-09-25 PROCEDURE — 96377 APPLICATON ON-BODY INJECTOR: CPT

## 2023-09-25 PROCEDURE — 96409 CHEMO IV PUSH SNGL DRUG: CPT

## 2023-09-25 PROCEDURE — 96367 TX/PROPH/DG ADDL SEQ IV INF: CPT

## 2023-09-25 RX ORDER — ACETAMINOPHEN 325 MG/1
650 TABLET ORAL ONCE
Status: COMPLETED | OUTPATIENT
Start: 2023-09-25 | End: 2023-09-25

## 2023-09-25 RX ORDER — POTASSIUM CHLORIDE 20 MEQ/1
40 TABLET, EXTENDED RELEASE ORAL ONCE
Status: COMPLETED | OUTPATIENT
Start: 2023-09-25 | End: 2023-09-25

## 2023-09-25 RX ORDER — SODIUM CHLORIDE 9 MG/ML
20 INJECTION, SOLUTION INTRAVENOUS ONCE
Status: COMPLETED | OUTPATIENT
Start: 2023-09-25 | End: 2023-09-25

## 2023-09-25 RX ADMIN — BLEOMYCIN SULFATE 30 UNITS: 30 POWDER, FOR SOLUTION INTRAMUSCULAR; INTRAPLEURAL; INTRAVENOUS; SUBCUTANEOUS at 15:47

## 2023-09-25 RX ADMIN — POTASSIUM CHLORIDE 40 MEQ: 1500 TABLET, EXTENDED RELEASE ORAL at 15:00

## 2023-09-25 RX ADMIN — PEGFILGRASTIM 6 MG: KIT SUBCUTANEOUS at 16:00

## 2023-09-25 RX ADMIN — SODIUM CHLORIDE 20 ML/HR: 0.9 INJECTION, SOLUTION INTRAVENOUS at 14:20

## 2023-09-25 RX ADMIN — ACETAMINOPHEN 650 MG: 325 TABLET, FILM COATED ORAL at 15:00

## 2023-09-25 RX ADMIN — DIPHENHYDRAMINE HYDROCHLORIDE 25 MG: 50 INJECTION, SOLUTION INTRAMUSCULAR; INTRAVENOUS at 14:58

## 2023-09-28 ENCOUNTER — APPOINTMENT (OUTPATIENT)
Dept: LAB | Facility: CLINIC | Age: 33
End: 2023-09-28
Payer: COMMERCIAL

## 2023-10-02 ENCOUNTER — HOSPITAL ENCOUNTER (OUTPATIENT)
Dept: INFUSION CENTER | Facility: HOSPITAL | Age: 33
Discharge: HOME | End: 2023-10-02
Payer: COMMERCIAL

## 2023-10-02 VITALS
HEIGHT: 67 IN | DIASTOLIC BLOOD PRESSURE: 81 MMHG | HEART RATE: 98 BPM | OXYGEN SATURATION: 98 % | TEMPERATURE: 98.7 F | BODY MASS INDEX: 29.41 KG/M2 | RESPIRATION RATE: 18 BRPM | SYSTOLIC BLOOD PRESSURE: 139 MMHG

## 2023-10-02 DIAGNOSIS — D70.1 CHEMOTHERAPY INDUCED NEUTROPENIA: Primary | ICD-10-CM

## 2023-10-02 DIAGNOSIS — T45.1X5A CHEMOTHERAPY INDUCED NEUTROPENIA: Primary | ICD-10-CM

## 2023-10-02 DIAGNOSIS — Z51.11 ADMISSION FOR CHEMOTHERAPY: ICD-10-CM

## 2023-10-02 DIAGNOSIS — C56.9 MALIGNANT NEOPLASM OF OVARY, UNSPECIFIED LATERALITY (HCC): ICD-10-CM

## 2023-10-02 PROCEDURE — 96367 TX/PROPH/DG ADDL SEQ IV INF: CPT

## 2023-10-02 PROCEDURE — 96409 CHEMO IV PUSH SNGL DRUG: CPT

## 2023-10-02 RX ORDER — SODIUM CHLORIDE 9 MG/ML
20 INJECTION, SOLUTION INTRAVENOUS ONCE
Status: COMPLETED | OUTPATIENT
Start: 2023-10-02 | End: 2023-10-02

## 2023-10-02 RX ORDER — ACETAMINOPHEN 325 MG/1
650 TABLET ORAL ONCE
Status: COMPLETED | OUTPATIENT
Start: 2023-10-02 | End: 2023-10-02

## 2023-10-02 RX ADMIN — ACETAMINOPHEN 650 MG: 325 TABLET, FILM COATED ORAL at 11:12

## 2023-10-02 RX ADMIN — SODIUM CHLORIDE 20 ML/HR: 0.9 INJECTION, SOLUTION INTRAVENOUS at 11:12

## 2023-10-02 RX ADMIN — DIPHENHYDRAMINE HYDROCHLORIDE 25 MG: 50 INJECTION, SOLUTION INTRAMUSCULAR; INTRAVENOUS at 11:12

## 2023-10-02 RX ADMIN — BLEOMYCIN SULFATE 30 UNITS: 30 POWDER, FOR SOLUTION INTRAMUSCULAR; INTRAPLEURAL; INTRAVENOUS; SUBCUTANEOUS at 11:49

## 2023-10-03 ENCOUNTER — TELEMEDICINE (OUTPATIENT)
Dept: GYNECOLOGIC ONCOLOGY | Facility: CLINIC | Age: 33
End: 2023-10-03
Payer: COMMERCIAL

## 2023-10-03 ENCOUNTER — TELEPHONE (OUTPATIENT)
Dept: GYNECOLOGIC ONCOLOGY | Facility: CLINIC | Age: 33
End: 2023-10-03

## 2023-10-03 DIAGNOSIS — C56.9 MALIGNANT NEOPLASM OF OVARY, UNSPECIFIED LATERALITY (HCC): Primary | ICD-10-CM

## 2023-10-03 PROCEDURE — 99213 OFFICE O/P EST LOW 20 MIN: CPT | Performed by: NURSE PRACTITIONER

## 2023-10-03 NOTE — PATIENT INSTRUCTIONS
[facility]  CONSULTATION       CHIEF COMPLAINT    Consultation (low back pain)       Mr. Mitchell is evaluated today at the request of Dr. Thakur.    HISTORY OF PRESENT ILLNESS    Mr. Mitchell is a 34 year old male, who presents with complaints of bilateral lower back pain (right more pronounced than the left). Onset of patients pain began sometime in his mid 20's and was not associated with a known event. He describes the character of pain as constant and aching, burning and sharp in nature. His pain is aggravated by bending and lifting type motions as well as being in any one position for long periods of time. He is unable to walk further than a mile without experiencing exacerbation. He states he currently works at a [employer] as a  which does require a significant amount of walking. He also admits his job has been highly stressful for him. Prior to exacerbation of back pain he was working 16 hour shifts. He is anticipating on resuming these hours once his pain levels are more manageable. Symptoms are alleviated by lying down, sitting, rest and medication. He does report associated sleep disturbance. He does report intermittent associated radicular component into the anterior aspect of his right thigh. He notes this is extremely rare. He denies associated numbness, weakness and dysesthesias. He denies bowel or bladder incontinence. He admits he is not performing a home exercise and stretching regimen. During today's discussion he states he has been reluctant to participating in a course of physical therapy due to not having a lot of free time on his hands. He is however interested in considering chiropractic treatment.    This does interfere with his activities of daily living and quality of life as listed below.  He expresses a desire for permanent disability.     Chet has tried and failed multiple prior treatments in the past including but not limited to:    1. Pain  1. Return to the office as per chemotherapy calendar. 2. Call with any new complaints or concerns. Reaction Noted   • Amoxicillin GI UPSET 12/09/2018        HISTORY:    PAST MEDICAL HISTORY:  Past Medical History:   Diagnosis Date   • Anxiety    • Bilateral plantar fasciitis 7/14/2014   • Degenerative arthritis of thumb 10/28/2015   • High cholesterol    • Hypertension    • Left hand pain 12/10/2015   • Left wrist pain 10/28/2015   • Numbness and tingling in left hand 12/10/2015       PAST SURGICAL HISTORY:   History reviewed. No pertinent surgical history.    FAMILY HISTORY:  Family History   Family history unknown: Yes     Family history of chemical dependency:  No  Family history of alcoholism:  No    SOCIAL HISTORY:  Living situation: The patient lives with child(patricia)  Occupation:  Full Time  HABITS:  Does the patient have a history of drug or alcohol abuse - No    The past 30 days the patient describes feeling frustrated.  The patient reports the pain has had quite a bit of impact on daily activities, sleep and work.    LIFESTYLE CHANGES:     During the past month, patient reports their pain has interfered with the following activities:         Not at all    A little bit   Moderately   Quite a bit   Extremely   Going to work   X          Performing household chores      X              Yard work                X       Socializing with friends                     Recreation and hobbies   X                  Sexual relations        X     Physical exercise   X          Sleep     X                  Appetite     X                       Social History     Tobacco Use   Smoking Status Light Tobacco Smoker   • Packs/day: 0.25   • Years: 17.00   • Pack years: 4.25   Smokeless Tobacco Never Used   Tobacco Comment    only when drinking       Social History     Substance and Sexual Activity   Alcohol Use Not on file       ALLERGIES:   Allergen Reactions   • Amoxicillin GI UPSET       MEDICATIONS:    Pain with urination)   []  Erectile Dysfunction  []  Hematuria (blood in urine)  []  Incontinence  []  Loss Of Sex Drive  []  Urgency          Hematologic/Lymphatic:   []  Easy Bleeding  []  Easy Bruising  []  Lymphadenopathy (swollen glands)         PHYSICAL EXAMINATION:    VITALS:   Visit Vitals  BP (!) 153/108 (BP Location: LUE - Left upper extremity, Patient Position: Sitting)   Pulse 80   Temp 96.8 °F (36 °C) (Temporal)   Resp 18   Wt 100.5 kg   SpO2 97%   BMI 30.89 kg/m²      GENERAL: Reveals a well-developed, well-nourished male who is casually dressed, seated comfortably in exam room in no acute distress. Mood and affect are appropriate and congruent.  HEENT: Normocephalic, atraumatic. Extraocular muscles are intact. Pupils are equal, react to light and accommodation. Pupils are 3 mm dilatation. Sclerae are anicteric, conjunctivae are pink and not injected. Oropharynx is clear.  NECK: Supple, without any cervical or supraclavicular adenopathies, no thyromegaly or masses. Good carotid upstrokes without appreciable bruits.  HEART: Regular rate and rhythm without gallop or murmur. Peripheral pulses are intact, radial and dorsal pedis.  LUNGS: Clear to auscultation bilaterally without wheezes, rales or rhonchi. Breathing is unlabored.  ABDOMEN: Soft, nondistended, nontender. There is no hepatosplenomegaly or masses.  SKIN: Warm, dry and intact. There are no rashes, lesions or ulcerations noted.  EXTREMITIES: No clubbing, cyanosis or edema noted.  MUSCULOSKELETAL: Full range junction cervical spine. Normal spinal alignment.  Mild diffuse paravertebral guarding.  He more significant tenderness he particularly at the bilateral L5-S1 levels and to a lesser degree L4-5.  Augmentation present lumbar extension right lateral flexion and right rotation.  Negative SI joint provocative maneuvers.  NEUROLOGIC: Patient is alert and oriented x3. Speech is fluent. Patient has intact recent and remote memory. Cranial nerves  Therapy     Number of Visits Requested:   1     Expiration Date:   8/7/2021     Return for lumbar medial branch block #1 bilateral L4-5, L5-S1.  · The patient is encouraged to continue home stretching and exercise regimen as reviewed today in clinic.  · Initiate physical therapy as ordered.    Face to face time with the patient was 45 minutes with greater than 50% spent in discussion, patient education and counseling.    The documentation recorded by the scribe accurately and completely reflects the service(s) I personally performed and the decisions made by me.     Raj Morrow MD  [facility]    A copy of this consultation has been sent electronically to Dr. Thakur.    CC: Delfin Perez MD

## 2023-10-03 NOTE — TELEPHONE ENCOUNTER
Called and informed patient of her CT SCAN appointment. I provided the date, time and location of this appointment. Patient was in agreement of it. I informed patient if she can not keep this appointment to call the office so we can reschedule it for her.

## 2023-10-03 NOTE — ASSESSMENT & PLAN NOTE
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 She is s/p 3 cycles of adjuvant chemotherapy with Cisplatin 20 mg/m2 on days 1-5 + etoposide 100mg/m2 on days 1-5 and bleomycin 30 mg IV on days 1, 8, and 15 of a 21 day cycle. She is fatigued, and has intermittent nausea. She has alternating constipation and diarrhea. Her PS is 0. Patient will proceed with cycle 4 as planned, pending labs. Will add a second dose of emend on Day 5 of treatment to combat nausea which typically hits during that weekend after her week-long stretch of chemo. She will return to the office as per her chemotherapy calendar with a pre-visit CT. Encouraged patient to consider taking anti-emetics ATC for more nausea relief.

## 2023-10-03 NOTE — PROGRESS NOTES
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 She is s/p 3 cycles of adjuvant chemotherapy with Cisplatin 20 mg/m2 on days 1-5 + etoposide 100mg/m2 on days 1-5 and bleomycin 30 mg IV on days 1, 8, and 15 of a 21 day cycle. She is fatigued, and has intermittent nausea. She has alternating constipation and diarrhea. Her PS is 0. Patient will proceed with cycle 4 as planned, pending labs. Will add a second dose of emend on Day 5 of treatment to combat nausea which typically hits during that weekend after her week-long stretch of chemo. She will return to the office as per her chemotherapy calendar with a pre-visit CT. Encouraged patient to consider taking anti-emetics ATC for more nausea relief. Relevant Orders    CT chest abdomen pelvis w contrast          Reason for visit is pre-chemo eval (4)  Chief Complaint   Patient presents with   • Virtual Regular Visit        Encounter provider ELIF Portillo    Provider located at [address] [place] [id number]      Recent Visits  No visits were found meeting these conditions. Showing recent visits within past 7 days and meeting all other requirements  Today's Visits  Date Type Provider Dept   10/03/23 Telemedicine Elvira Interiano, [address] [place] [address] today's visits and meeting all other requirements  Future Appointments  No visits were found meeting these conditions.   Showing future appointments within next 150 days and meeting all other requirements       The patient was identified by name and date of birth. Yulisa Chakraborty was informed that this is a telemedicine visit and that the visit is being conducted through the [employer]. She agrees to proceed. My office door was closed. No one else was in the room. She acknowledged consent and understanding of privacy and security of the video platform. The patient has agreed to participate and understands they can discontinue the visit at any time. Patient is aware this is a billable service. Crista Vera is a 27 y.o. female       Patient is tolerating treatment fairly. She has intermittent nausea, and has had 2 episodes of vomiting throughout her entire treatment course. She alternates between constipation and diarrhea. No abdominal pain, pelvic pain, changes in bladder function, or vaginal bleeding/discharge. Endorses a fair appetite and is without SOB, CP, and bloating. She is ambulatory and independent at home.          Past Medical History:   Diagnosis Date   • No known health problems    • PONV (postoperative nausea and vomiting)        Past Surgical History:   Procedure Laterality Date   • HYSTERECTOMY N/A 6/13/2023    Procedure: OCHOA/BSO, STAGING radical omentectomy bilateral pelvic lymph node dissection;  Surgeon: Joshua Valencia MD;  Location: [facility];  Service: Gynecology Oncology   • IR PORT PLACEMENT  7/24/2023   • ORIF TIBIA & FIBULA FRACTURES Right    • DC EXPLORATORY LAPAROTOMY CELIOTOMY W/WO BIOPSY SPX N/A 6/13/2023    Procedure: EX LAP;  Surgeon: Joshua Valencia MD;  Location: [facility];  Service: Gynecology Oncology   • URETERAL STENT PLACEMENT Bilateral 6/13/2023    Procedure: Lida Varela; STENT URETERAL;  Surgeon: Glen Graf MD;  Location: [facility];  Service: Urology   • WISDOM TOOTH EXTRACTION         Current Outpatient Medications   Medication Sig Dispense Refill   • cholecalciferol (VITAMIN D3) 1,000 units tablet Take 5,000 Units by mouth daily     • CRANIAL PROSTHESIS, RX, Apply for drug-induced alopecia 1 each 0   • docusate sodium (COLACE) 100 mg capsule Take 1 capsule (100 mg total) by mouth 2 (two) times a day 60 capsule 0   • LORazepam (ATIVAN) 1 mg tablet Take 1 tablet (1 mg total) by mouth every 6 (six) hours as needed for anxiety (or nausea) 36 tablet 1   • Multiple Vitamin (multivitamin) tablet Take 1 tablet by mouth daily     • ondansetron (ZOFRAN) 8 mg tablet Take 1 tablet (8 mg total) by mouth every 8 (eight) hours as needed for nausea or vomiting (Patient not taking: Reported on 8/22/2023) 30 tablet 1   • ondansetron (ZOFRAN-ODT) 8 mg disintegrating tablet Take 1 tablet (8 mg total) by mouth every 8 (eight) hours as needed for nausea or vomiting 20 tablet 0     No current facility-administered medications for this visit. Facility-Administered Medications Ordered in Other Visits   Medication Dose Route Frequency Provider Last Rate Last Admin   • alteplase (CATHFLO) injection 2 mg  2 mg Intracatheter Q1MIN PRN Monica Lino MD            Allergies   Allergen Reactions   • Penicillins Itching and Rash     Rash  Rash       Oncology History   Malignant neoplasm of ovary ([address])   6/13/2023 Surgery    Patient underwent exploratory laparotomy OCHOA/BSO and staging procedure. Intra-Op findings revealed a large left adnexal mass approximately 12 to 14 cm densely adherent to the sigmoid colon. It ruptured releasing chocolate fluid into the abdomen however prior staining of the omentum indicated likely previous rupture. Final pathology report after extensive review by [employer] was most consistent with endometrioid adenocarcinoma initiating within endometriosis within the ovary. Additionally yolk sac tumor was identified which extended to the local:. Making this a stage IIB tumor. Would recommend 4 cycles of bleomycin etoposide and cis-platinum.      7/11/2023 Initial Diagnosis    Malignant neoplasm of left ovary ([address])     7/11/2023 -  Cancer Staged    Staging form: Ovary, Fallopian Tube, Primary Peritoneal, AJCC 8th Edition  - Clinical stage from 7/11/2023: Stage IIB (cT2b, cN0, cM0) - Signed by Kaia Newman MD on 7/11/2023  Histopathologic type: Yolk sac tumor  Stage prefix: Initial diagnosis       7/31/2023 -  Chemotherapy    alteplase (CATHFLO), 2 mg, Intracatheter, Every 1 Minute as needed, 3 of 4 cycles  pegfilgrastim (NEULASTA ONPRO), 6 mg, Subcutaneous, Once, 3 of 4 cycles  Administration: 6 mg (8/7/2023), 6 mg (8/28/2023), 6 mg (9/25/2023)  bleomycin (BLENOXANE) IVPB, 30 Units, Intravenous, Once, 3 of 4 cycles  Administration: 30 Units (7/31/2023), 30 Units (8/7/2023), 30 Units (8/14/2023), 30 Units (8/21/2023), 30 Units (8/28/2023), 30 Units (9/5/2023), 30 Units (9/25/2023), 30 Units (10/2/2023), 30 Units (9/18/2023)  CISplatin (PLATINOL) infusion, 20 mg/m2 = 38.8 mg, Intravenous, Once, 3 of 4 cycles  Administration: 38.8 mg (7/31/2023), 38.8 mg (8/1/2023), 38.8 mg (8/21/2023), 38.8 mg (8/2/2023), 38.8 mg (8/3/2023), 38.8 mg (8/4/2023), 38.8 mg (8/22/2023), 38.8 mg (8/23/2023), 38.8 mg (8/24/2023), 38.8 mg (8/25/2023), 39 mg (9/18/2023), 39 mg (9/19/2023), 39 mg (9/20/2023), 39 mg (9/21/2023), 39 mg (9/22/2023)  fosaprepitant (EMEND) IVPB, 150 mg, Intravenous, Once, 3 of 4 cycles  Administration: 150 mg (7/31/2023), 150 mg (8/21/2023), 150 mg (9/18/2023)  etoposide (TOPOSAR), 100 mg/m2 = 194 mg, Intravenous, Once, 3 of 4 cycles  Administration: 194 mg (7/31/2023), 194 mg (8/1/2023), 194 mg (8/21/2023), 194 mg (8/2/2023), 194 mg (8/3/2023), 194 mg (8/4/2023), 194 mg (8/22/2023), 194 mg (8/23/2023), 194 mg (8/24/2023), 194 mg (8/25/2023), 200 mg (9/18/2023), 200 mg (9/19/2023), 200 mg (9/20/2023), 200 mg (9/21/2023), 200 mg (9/22/2023)           Review of Systems   Constitutional: Positive for fatigue. Negative for activity change, appetite change, chills, fever and unexpected weight change. HENT: Negative for mouth sores. Eyes: Negative.     Respiratory: Negative for cough, chest tightness, shortness of breath and wheezing. Cardiovascular: Negative for chest pain, palpitations and leg swelling. Gastrointestinal: Positive for constipation, diarrhea, nausea and vomiting. Negative for abdominal distention, abdominal pain, anal bleeding and blood in stool. Endocrine: Negative. Genitourinary: Negative for difficulty urinating, dysuria, flank pain, frequency, hematuria, pelvic pain, urgency, vaginal bleeding, vaginal discharge and vaginal pain. Musculoskeletal: Negative for arthralgias and myalgias. Skin: Negative for color change, pallor and rash. Neurological: Negative for dizziness, weakness, numbness and headaches. Hematological: Negative. Psychiatric/Behavioral: The patient is not nervous/anxious. Video Exam    There were no vitals filed for this visit. Patient's camera was not enabled during the visit.       Visit Time  Total Visit Duration: 15 min

## 2023-10-05 ENCOUNTER — TELEPHONE (OUTPATIENT)
Dept: HEMATOLOGY ONCOLOGY | Facility: CLINIC | Age: 33
End: 2023-10-05

## 2023-10-05 ENCOUNTER — APPOINTMENT (OUTPATIENT)
Dept: LAB | Facility: HOSPITAL | Age: 33
End: 2023-10-05
Payer: COMMERCIAL

## 2023-10-05 DIAGNOSIS — C56.2 MALIGNANT NEOPLASM OF LEFT OVARY (HCC): ICD-10-CM

## 2023-10-05 LAB
AFP-TM SERPL-MCNC: 6.18 NG/ML (ref 0–9)
CANCER AG125 SERPL-ACNC: 9.9 U/ML (ref 0–35)

## 2023-10-05 PROCEDURE — 83735 ASSAY OF MAGNESIUM: CPT

## 2023-10-05 PROCEDURE — 80053 COMPREHEN METABOLIC PANEL: CPT

## 2023-10-05 PROCEDURE — 36415 COLL VENOUS BLD VENIPUNCTURE: CPT

## 2023-10-05 PROCEDURE — 85007 BL SMEAR W/DIFF WBC COUNT: CPT

## 2023-10-05 PROCEDURE — 82105 ALPHA-FETOPROTEIN SERUM: CPT

## 2023-10-05 PROCEDURE — 86304 IMMUNOASSAY TUMOR CA 125: CPT

## 2023-10-05 PROCEDURE — 85027 COMPLETE CBC AUTOMATED: CPT

## 2023-10-05 NOTE — TELEPHONE ENCOUNTER
Received from voice mail:    "Sean Valero, my name is Libertad Chaidez and I'm calling from the [facility] and I have a critical result for Sobeida Monzon. Her table is December 26th, 1990. Please give us a call back at this phone number, [phone]. Thank you.  benjie Mcmahon."

## 2023-10-05 NOTE — TELEPHONE ENCOUNTER
Patient Call    Who are you speaking with? Yanet Castillo    If it is not the patient, are they listed on an active communication consent form? N/A   What is the reason for this call? Dk calling in with a critical result for the patient on a test ordered by Srikanth Duenas. Does this require a call back? No   If a call back is required, please list best call back number [phone]   If a call back is required, advise that a message will be forwarded to their care team and someone will return their call as soon as possible. Did you relay this information to the patient?  No

## 2023-10-06 RX ORDER — SODIUM CHLORIDE 9 MG/ML
20 INJECTION, SOLUTION INTRAVENOUS ONCE
Status: CANCELLED | OUTPATIENT
Start: 2023-10-16

## 2023-10-06 RX ORDER — ACETAMINOPHEN 325 MG/1
650 TABLET ORAL ONCE
Status: CANCELLED | OUTPATIENT
Start: 2023-10-23

## 2023-10-06 RX ORDER — SODIUM CHLORIDE 9 MG/ML
20 INJECTION, SOLUTION INTRAVENOUS ONCE
Status: CANCELLED | OUTPATIENT
Start: 2023-10-10

## 2023-10-06 RX ORDER — SODIUM CHLORIDE 9 MG/ML
20 INJECTION, SOLUTION INTRAVENOUS ONCE
Status: CANCELLED | OUTPATIENT
Start: 2023-10-09

## 2023-10-06 RX ORDER — ACETAMINOPHEN 325 MG/1
650 TABLET ORAL ONCE
Status: CANCELLED | OUTPATIENT
Start: 2023-10-16

## 2023-10-06 RX ORDER — SODIUM CHLORIDE 9 MG/ML
20 INJECTION, SOLUTION INTRAVENOUS ONCE
Status: CANCELLED | OUTPATIENT
Start: 2023-10-23

## 2023-10-06 RX ORDER — SODIUM CHLORIDE 9 MG/ML
20 INJECTION, SOLUTION INTRAVENOUS ONCE
Status: CANCELLED | OUTPATIENT
Start: 2023-10-12

## 2023-10-06 RX ORDER — ACETAMINOPHEN 325 MG/1
650 TABLET ORAL ONCE
Status: CANCELLED | OUTPATIENT
Start: 2023-10-09

## 2023-10-06 RX ORDER — SODIUM CHLORIDE 9 MG/ML
20 INJECTION, SOLUTION INTRAVENOUS ONCE
Status: CANCELLED | OUTPATIENT
Start: 2023-10-11

## 2023-10-06 RX ORDER — ACETAMINOPHEN 325 MG/1
650 TABLET ORAL ONCE
Status: CANCELLED
Start: 2023-10-10 | End: 2023-10-10

## 2023-10-06 RX ORDER — SODIUM CHLORIDE 9 MG/ML
20 INJECTION, SOLUTION INTRAVENOUS ONCE
Status: CANCELLED | OUTPATIENT
Start: 2023-10-13

## 2023-10-09 ENCOUNTER — HOSPITAL ENCOUNTER (OUTPATIENT)
Dept: INFUSION CENTER | Facility: HOSPITAL | Age: 33
Discharge: HOME | End: 2023-10-09
Attending: OBSTETRICS & GYNECOLOGY
Payer: COMMERCIAL

## 2023-10-09 VITALS
HEART RATE: 86 BPM | WEIGHT: 185.19 LBS | OXYGEN SATURATION: 98 % | HEIGHT: 67 IN | RESPIRATION RATE: 18 BRPM | TEMPERATURE: 97.9 F | SYSTOLIC BLOOD PRESSURE: 117 MMHG | DIASTOLIC BLOOD PRESSURE: 70 MMHG | BODY MASS INDEX: 29.07 KG/M2

## 2023-10-09 DIAGNOSIS — T45.1X5A CHEMOTHERAPY INDUCED NEUTROPENIA: Primary | ICD-10-CM

## 2023-10-09 DIAGNOSIS — Z51.11 ADMISSION FOR CHEMOTHERAPY: ICD-10-CM

## 2023-10-09 DIAGNOSIS — D70.1 CHEMOTHERAPY INDUCED NEUTROPENIA: Primary | ICD-10-CM

## 2023-10-09 DIAGNOSIS — C56.9 MALIGNANT NEOPLASM OF OVARY, UNSPECIFIED LATERALITY (HCC): ICD-10-CM

## 2023-10-09 PROCEDURE — 96417 CHEMO IV INFUS EACH ADDL SEQ: CPT

## 2023-10-09 PROCEDURE — 96413 CHEMO IV INFUSION 1 HR: CPT

## 2023-10-09 PROCEDURE — 96367 TX/PROPH/DG ADDL SEQ IV INF: CPT

## 2023-10-09 PROCEDURE — 96411 CHEMO IV PUSH ADDL DRUG: CPT

## 2023-10-09 PROCEDURE — 96361 HYDRATE IV INFUSION ADD-ON: CPT

## 2023-10-09 RX ORDER — ACETAMINOPHEN 325 MG/1
650 TABLET ORAL ONCE
Status: COMPLETED | OUTPATIENT
Start: 2023-10-09 | End: 2023-10-09

## 2023-10-09 RX ORDER — SODIUM CHLORIDE 9 MG/ML
20 INJECTION, SOLUTION INTRAVENOUS ONCE
Status: COMPLETED | OUTPATIENT
Start: 2023-10-09 | End: 2023-10-09

## 2023-10-09 RX ADMIN — SODIUM CHLORIDE 500 ML: 0.9 INJECTION, SOLUTION INTRAVENOUS at 13:55

## 2023-10-09 RX ADMIN — DEXAMETHASONE SODIUM PHOSPHATE: 10 INJECTION, SOLUTION INTRAMUSCULAR; INTRAVENOUS at 09:29

## 2023-10-09 RX ADMIN — DIPHENHYDRAMINE HYDROCHLORIDE 25 MG: 50 INJECTION, SOLUTION INTRAMUSCULAR; INTRAVENOUS at 08:41

## 2023-10-09 RX ADMIN — BLEOMYCIN SULFATE 30 UNITS: 30 POWDER, FOR SOLUTION INTRAMUSCULAR; INTRAPLEURAL; INTRAVENOUS; SUBCUTANEOUS at 13:36

## 2023-10-09 RX ADMIN — ACETAMINOPHEN 650 MG: 325 TABLET, FILM COATED ORAL at 08:50

## 2023-10-09 RX ADMIN — FOSAPREPITANT 150 MG: 150 INJECTION, POWDER, LYOPHILIZED, FOR SOLUTION INTRAVENOUS at 10:05

## 2023-10-09 RX ADMIN — ETOPOSIDE 200 MG: 20 INJECTION, SOLUTION INTRAVENOUS at 12:13

## 2023-10-09 RX ADMIN — SODIUM CHLORIDE 20 ML/HR: 0.9 INJECTION, SOLUTION INTRAVENOUS at 08:40

## 2023-10-09 RX ADMIN — SODIUM CHLORIDE 500 ML: 0.9 INJECTION, SOLUTION INTRAVENOUS at 08:41

## 2023-10-09 RX ADMIN — CISPLATIN 39 MG: 50 INJECTION, SOLUTION INTRAVENOUS at 10:55

## 2023-10-09 NOTE — PROGRESS NOTES
Labs reviewed. Pt tolerated Cisplatin, Etoposide, and Bleomycin infusion well with no issues. RCW port stayed accessed for appointment tomorrow. Passive disinfecting cap applied. AVS declined. Pt aware of next appointment date and time. Pt discharged off unit in stable condition with all personal belongings.

## 2023-10-10 ENCOUNTER — HOSPITAL ENCOUNTER (OUTPATIENT)
Dept: INFUSION CENTER | Facility: HOSPITAL | Age: 33
Discharge: HOME | End: 2023-10-10
Attending: OBSTETRICS & GYNECOLOGY
Payer: COMMERCIAL

## 2023-10-10 VITALS
SYSTOLIC BLOOD PRESSURE: 138 MMHG | WEIGHT: 185.19 LBS | HEIGHT: 67 IN | TEMPERATURE: 98 F | BODY MASS INDEX: 29.07 KG/M2 | DIASTOLIC BLOOD PRESSURE: 92 MMHG | RESPIRATION RATE: 18 BRPM | HEART RATE: 104 BPM

## 2023-10-10 DIAGNOSIS — C56.9 MALIGNANT NEOPLASM OF OVARY, UNSPECIFIED LATERALITY (HCC): ICD-10-CM

## 2023-10-10 DIAGNOSIS — T45.1X5A CHEMOTHERAPY INDUCED NEUTROPENIA: Primary | ICD-10-CM

## 2023-10-10 DIAGNOSIS — Z51.11 ADMISSION FOR CHEMOTHERAPY: ICD-10-CM

## 2023-10-10 DIAGNOSIS — D70.1 CHEMOTHERAPY INDUCED NEUTROPENIA: Primary | ICD-10-CM

## 2023-10-10 PROCEDURE — 96417 CHEMO IV INFUS EACH ADDL SEQ: CPT

## 2023-10-10 PROCEDURE — 96413 CHEMO IV INFUSION 1 HR: CPT

## 2023-10-10 PROCEDURE — 96361 HYDRATE IV INFUSION ADD-ON: CPT

## 2023-10-10 PROCEDURE — 96367 TX/PROPH/DG ADDL SEQ IV INF: CPT

## 2023-10-10 RX ORDER — ACETAMINOPHEN 325 MG/1
650 TABLET ORAL ONCE
Status: DISCONTINUED | OUTPATIENT
Start: 2023-10-10 | End: 2023-10-13 | Stop reason: HOSPADM

## 2023-10-10 RX ORDER — SODIUM CHLORIDE 9 MG/ML
20 INJECTION, SOLUTION INTRAVENOUS ONCE
Status: COMPLETED | OUTPATIENT
Start: 2023-10-10 | End: 2023-10-10

## 2023-10-10 RX ADMIN — SODIUM CHLORIDE 500 ML: 0.9 INJECTION, SOLUTION INTRAVENOUS at 12:07

## 2023-10-10 RX ADMIN — SODIUM CHLORIDE 20 ML/HR: 0.9 INJECTION, SOLUTION INTRAVENOUS at 08:23

## 2023-10-10 RX ADMIN — CISPLATIN 39 MG: 50 INJECTION, SOLUTION INTRAVENOUS at 09:42

## 2023-10-10 RX ADMIN — DEXAMETHASONE SODIUM PHOSPHATE: 10 INJECTION, SOLUTION INTRAMUSCULAR; INTRAVENOUS at 08:25

## 2023-10-10 RX ADMIN — ETOPOSIDE 200 MG: 20 INJECTION, SOLUTION INTRAVENOUS at 10:57

## 2023-10-10 RX ADMIN — SODIUM CHLORIDE 500 ML: 0.9 INJECTION, SOLUTION INTRAVENOUS at 08:23

## 2023-10-10 NOTE — PROGRESS NOTES
Pt tolerated Cisplatin and Etoposide infusion well with no issues. RCW port stayed accessed for appointment tomorrow. Passive disinfecting cap applied. AVS declined. Pt aware of next appointment date and time. Pt discharged off unit in stable condition with all personal belongings.

## 2023-10-11 ENCOUNTER — HOSPITAL ENCOUNTER (OUTPATIENT)
Dept: INFUSION CENTER | Facility: HOSPITAL | Age: 33
Discharge: HOME | End: 2023-10-11
Attending: OBSTETRICS & GYNECOLOGY
Payer: COMMERCIAL

## 2023-10-11 VITALS
WEIGHT: 185.19 LBS | HEART RATE: 85 BPM | DIASTOLIC BLOOD PRESSURE: 76 MMHG | SYSTOLIC BLOOD PRESSURE: 127 MMHG | HEIGHT: 67 IN | RESPIRATION RATE: 16 BRPM | BODY MASS INDEX: 29.07 KG/M2 | TEMPERATURE: 97.4 F

## 2023-10-11 DIAGNOSIS — Z51.11 ADMISSION FOR CHEMOTHERAPY: Primary | ICD-10-CM

## 2023-10-11 DIAGNOSIS — T45.1X5A CHEMOTHERAPY INDUCED NEUTROPENIA: ICD-10-CM

## 2023-10-11 DIAGNOSIS — D70.1 CHEMOTHERAPY INDUCED NEUTROPENIA: ICD-10-CM

## 2023-10-11 DIAGNOSIS — C56.9 MALIGNANT NEOPLASM OF OVARY, UNSPECIFIED LATERALITY (HCC): ICD-10-CM

## 2023-10-11 PROCEDURE — 96367 TX/PROPH/DG ADDL SEQ IV INF: CPT

## 2023-10-11 PROCEDURE — 96417 CHEMO IV INFUS EACH ADDL SEQ: CPT

## 2023-10-11 PROCEDURE — 96413 CHEMO IV INFUSION 1 HR: CPT

## 2023-10-11 PROCEDURE — 96361 HYDRATE IV INFUSION ADD-ON: CPT

## 2023-10-11 RX ORDER — SODIUM CHLORIDE 9 MG/ML
20 INJECTION, SOLUTION INTRAVENOUS ONCE
Status: COMPLETED | OUTPATIENT
Start: 2023-10-11 | End: 2023-10-11

## 2023-10-11 RX ADMIN — DEXAMETHASONE SODIUM PHOSPHATE: 10 INJECTION, SOLUTION INTRAMUSCULAR; INTRAVENOUS at 10:40

## 2023-10-11 RX ADMIN — ETOPOSIDE 200 MG: 20 INJECTION, SOLUTION INTRAVENOUS at 13:05

## 2023-10-11 RX ADMIN — SODIUM CHLORIDE 20 ML/HR: 0.9 INJECTION, SOLUTION INTRAVENOUS at 10:35

## 2023-10-11 RX ADMIN — CISPLATIN 39 MG: 50 INJECTION, SOLUTION INTRAVENOUS at 11:49

## 2023-10-11 RX ADMIN — SODIUM CHLORIDE 500 ML: 0.9 INJECTION, SOLUTION INTRAVENOUS at 10:35

## 2023-10-11 RX ADMIN — SODIUM CHLORIDE 500 ML: 0.9 INJECTION, SOLUTION INTRAVENOUS at 14:13

## 2023-10-11 NOTE — PROGRESS NOTES
Pt tolerated Cisplatin and Etoposide infusion well with no issues. RCW port deaccessed per protocol. AVS declined. Pt aware of next appointment date and time. Pt discharged off unit in stable condition with all personal belongings.

## 2023-10-12 ENCOUNTER — HOSPITAL ENCOUNTER (OUTPATIENT)
Dept: INFUSION CENTER | Facility: HOSPITAL | Age: 33
Discharge: HOME | End: 2023-10-12
Attending: OBSTETRICS & GYNECOLOGY
Payer: COMMERCIAL

## 2023-10-12 VITALS
TEMPERATURE: 97.2 F | RESPIRATION RATE: 16 BRPM | WEIGHT: 185.19 LBS | HEIGHT: 67 IN | DIASTOLIC BLOOD PRESSURE: 78 MMHG | SYSTOLIC BLOOD PRESSURE: 142 MMHG | BODY MASS INDEX: 29.07 KG/M2 | HEART RATE: 82 BPM

## 2023-10-12 DIAGNOSIS — C56.2 MALIGNANT NEOPLASM OF LEFT OVARY (HCC): ICD-10-CM

## 2023-10-12 DIAGNOSIS — C56.9 MALIGNANT NEOPLASM OF OVARY, UNSPECIFIED LATERALITY (HCC): ICD-10-CM

## 2023-10-12 DIAGNOSIS — Z51.11 ADMISSION FOR CHEMOTHERAPY: Primary | ICD-10-CM

## 2023-10-12 DIAGNOSIS — T45.1X5A CHEMOTHERAPY INDUCED NEUTROPENIA: ICD-10-CM

## 2023-10-12 DIAGNOSIS — D70.1 CHEMOTHERAPY INDUCED NEUTROPENIA: ICD-10-CM

## 2023-10-12 LAB
ALBUMIN SERPL BCP-MCNC: 4.1 G/DL (ref 3.5–5)
ALP SERPL-CCNC: 57 U/L (ref 34–104)
ALT SERPL W P-5'-P-CCNC: 28 U/L (ref 7–52)
ANION GAP SERPL CALCULATED.3IONS-SCNC: 10 MMOL/L
AST SERPL W P-5'-P-CCNC: 24 U/L (ref 13–39)
BASOPHILS # BLD AUTO: 0.01 THOUSANDS/ÂΜL (ref 0–0.1)
BASOPHILS NFR BLD AUTO: 0 % (ref 0–1)
BILIRUB SERPL-MCNC: 0.4 MG/DL (ref 0.2–1)
BUN SERPL-MCNC: 14 MG/DL (ref 5–25)
CALCIUM SERPL-MCNC: 9 MG/DL (ref 8.4–10.2)
CHLORIDE SERPL-SCNC: 100 MMOL/L (ref 96–108)
CO2 SERPL-SCNC: 25 MMOL/L (ref 21–32)
CREAT SERPL-MCNC: 0.75 MG/DL (ref 0.6–1.3)
EOSINOPHIL # BLD AUTO: 0 THOUSAND/ÂΜL (ref 0–0.61)
EOSINOPHIL NFR BLD AUTO: 0 % (ref 0–6)
ERYTHROCYTE [DISTWIDTH] IN BLOOD BY AUTOMATED COUNT: 17.6 % (ref 11.6–15.1)
GFR SERPL CREATININE-BSD FRML MDRD: 104 ML/MIN/1.73SQ M
GLUCOSE SERPL-MCNC: 84 MG/DL (ref 65–140)
HCT VFR BLD AUTO: 29.6 % (ref 34.8–46.1)
HGB BLD-MCNC: 9.7 G/DL (ref 11.5–15.4)
IMM GRANULOCYTES # BLD AUTO: 0.12 THOUSAND/UL (ref 0–0.2)
IMM GRANULOCYTES NFR BLD AUTO: 2 % (ref 0–2)
LYMPHOCYTES # BLD AUTO: 1.3 THOUSANDS/ÂΜL (ref 0.6–4.47)
LYMPHOCYTES NFR BLD AUTO: 22 % (ref 14–44)
MAGNESIUM SERPL-MCNC: 1.6 MG/DL (ref 1.9–2.7)
MCH RBC QN AUTO: 30.2 PG (ref 26.8–34.3)
MCHC RBC AUTO-ENTMCNC: 32.8 G/DL (ref 31.4–37.4)
MCV RBC AUTO: 92 FL (ref 82–98)
MONOCYTES # BLD AUTO: 0.36 THOUSAND/ÂΜL (ref 0.17–1.22)
MONOCYTES NFR BLD AUTO: 6 % (ref 4–12)
NEUTROPHILS # BLD AUTO: 4.14 THOUSANDS/ÂΜL (ref 1.85–7.62)
NEUTS SEG NFR BLD AUTO: 70 % (ref 43–75)
NRBC BLD AUTO-RTO: 0 /100 WBCS
PLATELET # BLD AUTO: 269 THOUSANDS/UL (ref 149–390)
PMV BLD AUTO: 9.7 FL (ref 8.9–12.7)
POTASSIUM SERPL-SCNC: 3.5 MMOL/L (ref 3.5–5.3)
PROT SERPL-MCNC: 6.4 G/DL (ref 6.4–8.4)
RBC # BLD AUTO: 3.21 MILLION/UL (ref 3.81–5.12)
SODIUM SERPL-SCNC: 135 MMOL/L (ref 135–147)
WBC # BLD AUTO: 5.93 THOUSAND/UL (ref 4.31–10.16)

## 2023-10-12 PROCEDURE — 83735 ASSAY OF MAGNESIUM: CPT

## 2023-10-12 PROCEDURE — 85025 COMPLETE CBC W/AUTO DIFF WBC: CPT

## 2023-10-12 PROCEDURE — 80053 COMPREHEN METABOLIC PANEL: CPT

## 2023-10-12 RX ORDER — SODIUM CHLORIDE 9 MG/ML
20 INJECTION, SOLUTION INTRAVENOUS ONCE
Status: COMPLETED | OUTPATIENT
Start: 2023-10-12 | End: 2023-10-12

## 2023-10-12 RX ADMIN — SODIUM CHLORIDE 500 ML: 0.9 INJECTION, SOLUTION INTRAVENOUS at 13:14

## 2023-10-12 RX ADMIN — DEXAMETHASONE SODIUM PHOSPHATE: 10 INJECTION, SOLUTION INTRAMUSCULAR; INTRAVENOUS at 09:05

## 2023-10-12 RX ADMIN — ETOPOSIDE 200 MG: 20 INJECTION, SOLUTION INTRAVENOUS at 12:04

## 2023-10-12 RX ADMIN — CISPLATIN 39 MG: 50 INJECTION, SOLUTION INTRAVENOUS at 10:32

## 2023-10-12 RX ADMIN — SODIUM CHLORIDE 500 ML: 0.9 INJECTION, SOLUTION INTRAVENOUS at 09:05

## 2023-10-12 RX ADMIN — SODIUM CHLORIDE 20 ML/HR: 0.9 INJECTION, SOLUTION INTRAVENOUS at 09:05

## 2023-10-12 NOTE — PROGRESS NOTES
Central labs obtained per orders. Pt tolerated Cisplatin and Etoposide infusion well with no issues. RCW port stayed accessed for appointment tomorrow. Passive disinfecting cap applied. AVS declined. Pt aware of next appointment date and time. Pt discharged off unit in stable condition with all personal belongings.

## 2023-10-13 ENCOUNTER — HOSPITAL ENCOUNTER (OUTPATIENT)
Dept: INFUSION CENTER | Facility: HOSPITAL | Age: 33
End: 2023-10-13
Attending: OBSTETRICS & GYNECOLOGY
Payer: COMMERCIAL

## 2023-10-13 VITALS
WEIGHT: 185.19 LBS | RESPIRATION RATE: 16 BRPM | HEART RATE: 76 BPM | HEIGHT: 67 IN | DIASTOLIC BLOOD PRESSURE: 82 MMHG | TEMPERATURE: 98.5 F | BODY MASS INDEX: 29.07 KG/M2 | SYSTOLIC BLOOD PRESSURE: 149 MMHG

## 2023-10-13 DIAGNOSIS — Z51.11 ADMISSION FOR CHEMOTHERAPY: Primary | ICD-10-CM

## 2023-10-13 DIAGNOSIS — C56.9 MALIGNANT NEOPLASM OF OVARY, UNSPECIFIED LATERALITY (HCC): ICD-10-CM

## 2023-10-13 DIAGNOSIS — D70.1 CHEMOTHERAPY INDUCED NEUTROPENIA: ICD-10-CM

## 2023-10-13 DIAGNOSIS — T45.1X5A CHEMOTHERAPY INDUCED NEUTROPENIA: ICD-10-CM

## 2023-10-13 RX ORDER — SODIUM CHLORIDE 9 MG/ML
20 INJECTION, SOLUTION INTRAVENOUS ONCE
Status: COMPLETED | OUTPATIENT
Start: 2023-10-13 | End: 2023-10-13

## 2023-10-13 RX ADMIN — FOSAPREPITANT 150 MG: 150 INJECTION, POWDER, LYOPHILIZED, FOR SOLUTION INTRAVENOUS at 09:01

## 2023-10-13 RX ADMIN — SODIUM CHLORIDE 500 ML: 0.9 INJECTION, SOLUTION INTRAVENOUS at 12:15

## 2023-10-13 RX ADMIN — SODIUM CHLORIDE 500 ML: 0.9 INJECTION, SOLUTION INTRAVENOUS at 08:19

## 2023-10-13 RX ADMIN — DEXAMETHASONE SODIUM PHOSPHATE: 10 INJECTION, SOLUTION INTRAMUSCULAR; INTRAVENOUS at 08:21

## 2023-10-13 RX ADMIN — CISPLATIN 39 MG: 50 INJECTION, SOLUTION INTRAVENOUS at 09:47

## 2023-10-13 RX ADMIN — SODIUM CHLORIDE 20 ML/HR: 0.9 INJECTION, SOLUTION INTRAVENOUS at 08:21

## 2023-10-13 RX ADMIN — ETOPOSIDE 200 MG: 20 INJECTION, SOLUTION INTRAVENOUS at 11:05

## 2023-10-16 ENCOUNTER — HOSPITAL ENCOUNTER (OUTPATIENT)
Dept: INFUSION CENTER | Facility: HOSPITAL | Age: 33
Discharge: HOME | End: 2023-10-16
Payer: COMMERCIAL

## 2023-10-16 VITALS
WEIGHT: 185.19 LBS | HEIGHT: 67 IN | TEMPERATURE: 97.1 F | DIASTOLIC BLOOD PRESSURE: 68 MMHG | HEART RATE: 94 BPM | SYSTOLIC BLOOD PRESSURE: 138 MMHG | BODY MASS INDEX: 29.07 KG/M2 | RESPIRATION RATE: 16 BRPM

## 2023-10-16 DIAGNOSIS — D70.1 CHEMOTHERAPY INDUCED NEUTROPENIA: ICD-10-CM

## 2023-10-16 DIAGNOSIS — C56.9 MALIGNANT NEOPLASM OF OVARY, UNSPECIFIED LATERALITY (HCC): ICD-10-CM

## 2023-10-16 DIAGNOSIS — Z51.11 ADMISSION FOR CHEMOTHERAPY: Primary | ICD-10-CM

## 2023-10-16 DIAGNOSIS — T45.1X5A CHEMOTHERAPY INDUCED NEUTROPENIA: ICD-10-CM

## 2023-10-16 RX ORDER — SODIUM CHLORIDE 9 MG/ML
20 INJECTION, SOLUTION INTRAVENOUS ONCE
Status: COMPLETED | OUTPATIENT
Start: 2023-10-16 | End: 2023-10-16

## 2023-10-16 RX ORDER — ACETAMINOPHEN 325 MG/1
650 TABLET ORAL ONCE
Status: COMPLETED | OUTPATIENT
Start: 2023-10-16 | End: 2023-10-16

## 2023-10-16 RX ADMIN — DIPHENHYDRAMINE HYDROCHLORIDE 25 MG: 50 INJECTION, SOLUTION INTRAMUSCULAR; INTRAVENOUS at 08:23

## 2023-10-16 RX ADMIN — ACETAMINOPHEN 650 MG: 325 TABLET, FILM COATED ORAL at 08:23

## 2023-10-16 RX ADMIN — PEGFILGRASTIM 6 MG: KIT SUBCUTANEOUS at 09:52

## 2023-10-16 RX ADMIN — SODIUM CHLORIDE 20 ML/HR: 0.9 INJECTION, SOLUTION INTRAVENOUS at 08:24

## 2023-10-16 RX ADMIN — BLEOMYCIN SULFATE 30 UNITS: 30 POWDER, FOR SOLUTION INTRAMUSCULAR; INTRAPLEURAL; INTRAVENOUS; SUBCUTANEOUS at 09:29

## 2023-10-16 NOTE — PROGRESS NOTES
Recent labs reviewed. Pt tolerated Bleomycin well with no issues. Port deaccessed per protocol. Neulasta Onpro applied to JUDD. Green light actively blinking. Pt educated. AVS declined. Pt aware of next scheduled appt date and time. Pt ambulated off unit in stable condition.

## 2023-10-19 ENCOUNTER — APPOINTMENT (OUTPATIENT)
Dept: LAB | Facility: CLINIC | Age: 33
End: 2023-10-19
Payer: COMMERCIAL

## 2023-10-19 ENCOUNTER — PATIENT OUTREACH (OUTPATIENT)
Dept: HEMATOLOGY ONCOLOGY | Facility: CLINIC | Age: 33
End: 2023-10-19

## 2023-10-19 DIAGNOSIS — C56.2 MALIGNANT NEOPLASM OF LEFT OVARY (HCC): ICD-10-CM

## 2023-10-19 LAB
AFP-TM SERPL-MCNC: 7.44 NG/ML (ref 0–9)
CANCER AG125 SERPL-ACNC: 8.8 U/ML (ref 0–35)

## 2023-10-19 PROCEDURE — 83735 ASSAY OF MAGNESIUM: CPT | Performed by: NURSE PRACTITIONER

## 2023-10-19 PROCEDURE — 36415 COLL VENOUS BLD VENIPUNCTURE: CPT

## 2023-10-19 PROCEDURE — 85007 BL SMEAR W/DIFF WBC COUNT: CPT | Performed by: NURSE PRACTITIONER

## 2023-10-19 PROCEDURE — 86304 IMMUNOASSAY TUMOR CA 125: CPT

## 2023-10-19 PROCEDURE — 80053 COMPREHEN METABOLIC PANEL: CPT | Performed by: NURSE PRACTITIONER

## 2023-10-19 PROCEDURE — 85027 COMPLETE CBC AUTOMATED: CPT | Performed by: NURSE PRACTITIONER

## 2023-10-19 PROCEDURE — 82105 ALPHA-FETOPROTEIN SERUM: CPT

## 2023-10-19 NOTE — PROGRESS NOTES
Reached out to PT to discuss change of work status and financial implications. Wanted to ask if she intends to pursue COBRA, the timeline of RTW, and if any other supportive measures financially can be taken. Left a VM with my contact information. Will F/U in 1 week. Richard Perez MPH  ILXIH:[phone]  Email: [email or web address]

## 2023-10-23 ENCOUNTER — HOSPITAL ENCOUNTER (OUTPATIENT)
Dept: INFUSION CENTER | Facility: HOSPITAL | Age: 33
Discharge: HOME | End: 2023-10-23
Payer: COMMERCIAL

## 2023-10-23 VITALS
TEMPERATURE: 98.1 F | SYSTOLIC BLOOD PRESSURE: 140 MMHG | DIASTOLIC BLOOD PRESSURE: 82 MMHG | OXYGEN SATURATION: 97 % | WEIGHT: 182.76 LBS | HEIGHT: 67 IN | RESPIRATION RATE: 16 BRPM | HEART RATE: 102 BPM | BODY MASS INDEX: 28.69 KG/M2

## 2023-10-23 DIAGNOSIS — T45.1X5A CHEMOTHERAPY INDUCED NEUTROPENIA: ICD-10-CM

## 2023-10-23 DIAGNOSIS — D70.1 CHEMOTHERAPY INDUCED NEUTROPENIA: ICD-10-CM

## 2023-10-23 DIAGNOSIS — C56.9 MALIGNANT NEOPLASM OF OVARY, UNSPECIFIED LATERALITY (HCC): ICD-10-CM

## 2023-10-23 DIAGNOSIS — Z51.11 ADMISSION FOR CHEMOTHERAPY: Primary | ICD-10-CM

## 2023-10-23 PROCEDURE — 96367 TX/PROPH/DG ADDL SEQ IV INF: CPT

## 2023-10-23 PROCEDURE — 96409 CHEMO IV PUSH SNGL DRUG: CPT

## 2023-10-23 RX ORDER — SODIUM CHLORIDE 9 MG/ML
20 INJECTION, SOLUTION INTRAVENOUS ONCE
Status: COMPLETED | OUTPATIENT
Start: 2023-10-23 | End: 2023-10-23

## 2023-10-23 RX ORDER — ACETAMINOPHEN 325 MG/1
650 TABLET ORAL ONCE
Status: DISCONTINUED | OUTPATIENT
Start: 2023-10-23 | End: 2023-10-26 | Stop reason: HOSPADM

## 2023-10-23 RX ADMIN — SODIUM CHLORIDE 30 UNITS: 9 INJECTION, SOLUTION INTRAVENOUS at 10:42

## 2023-10-23 RX ADMIN — DIPHENHYDRAMINE HYDROCHLORIDE 25 MG: 50 INJECTION, SOLUTION INTRAMUSCULAR; INTRAVENOUS at 09:43

## 2023-10-23 RX ADMIN — SODIUM CHLORIDE 20 ML/HR: 0.9 INJECTION, SOLUTION INTRAVENOUS at 09:43

## 2023-10-23 NOTE — PROGRESS NOTES
Recent labs reviewed. Pt tolerated Bleomycin well with no incident. Port deaccessed per protocol. AVS declined. Pt has f/u appt with Dr. Moo Bolton scheduled on 11/6/23. Pt discharged off unit in stable condition with all personal belongings.

## 2023-10-26 ENCOUNTER — APPOINTMENT (OUTPATIENT)
Dept: LAB | Facility: CLINIC | Age: 33
End: 2023-10-26
Payer: COMMERCIAL

## 2023-10-26 ENCOUNTER — PATIENT OUTREACH (OUTPATIENT)
Dept: HEMATOLOGY ONCOLOGY | Facility: CLINIC | Age: 33
End: 2023-10-26

## 2023-10-26 NOTE — PROGRESS NOTES
Told PT I would f/u with her regarding collections bill and financial nishant. She is awaiting [address] [place] [address], and will  f/u again in 1 week. Phone:[phone]  Email: [email or web address]

## 2023-11-01 ENCOUNTER — HOSPITAL ENCOUNTER (OUTPATIENT)
Dept: CT IMAGING | Facility: HOSPITAL | Age: 33
Discharge: HOME | End: 2023-11-01

## 2023-11-01 DIAGNOSIS — C56.9 MALIGNANT NEOPLASM OF OVARY, UNSPECIFIED LATERALITY (HCC): ICD-10-CM

## 2023-11-01 PROCEDURE — 71260 CT THORAX DX C+: CPT

## 2023-11-01 PROCEDURE — 74177 CT ABD & PELVIS W/CONTRAST: CPT

## 2023-11-01 PROCEDURE — G1004 CDSM NDSC: HCPCS

## 2023-11-01 RX ADMIN — IOHEXOL 100 ML: 350 INJECTION, SOLUTION INTRAVENOUS at 10:34

## 2023-11-01 RX ADMIN — IOHEXOL 50 ML: 240 INJECTION, SOLUTION INTRATHECAL; INTRAVASCULAR; INTRAVENOUS; ORAL at 10:34

## 2023-11-02 ENCOUNTER — APPOINTMENT (OUTPATIENT)
Dept: LAB | Facility: CLINIC | Age: 33
End: 2023-11-02

## 2023-11-02 ENCOUNTER — TELEPHONE (OUTPATIENT)
Dept: HEMATOLOGY ONCOLOGY | Facility: CLINIC | Age: 33
End: 2023-11-02

## 2023-11-02 DIAGNOSIS — J98.4 BLEOMYCIN LUNG TOXICITY: ICD-10-CM

## 2023-11-02 DIAGNOSIS — C56.9 MALIGNANT NEOPLASM OF OVARY, UNSPECIFIED LATERALITY (HCC): Primary | ICD-10-CM

## 2023-11-02 DIAGNOSIS — T45.1X5A BLEOMYCIN LUNG TOXICITY: ICD-10-CM

## 2023-11-02 PROCEDURE — 85007 BL SMEAR W/DIFF WBC COUNT: CPT | Performed by: NURSE PRACTITIONER

## 2023-11-02 PROCEDURE — 85027 COMPLETE CBC AUTOMATED: CPT | Performed by: NURSE PRACTITIONER

## 2023-11-02 PROCEDURE — 83735 ASSAY OF MAGNESIUM: CPT | Performed by: NURSE PRACTITIONER

## 2023-11-02 PROCEDURE — 80053 COMPREHEN METABOLIC PANEL: CPT | Performed by: NURSE PRACTITIONER

## 2023-11-02 NOTE — TELEPHONE ENCOUNTER
Called and informed patient of her consult for pulmonary. Patient was provided with the date, time  and location of this appointment. Patient stated to me while on the phone she is concerned of what is going on. I will reach out to the provider to call patient at his earliest convince.

## 2023-11-03 ENCOUNTER — PATIENT OUTREACH (OUTPATIENT)
Dept: HEMATOLOGY ONCOLOGY | Facility: CLINIC | Age: 33
End: 2023-11-03

## 2023-11-03 NOTE — PROGRESS NOTES
Safia called to inquire about [facility] and status of FA. She did formally apply. She told me that she did not send bank statements, I encouraged her to send that as supplemental, as it will not be approved w/o. She was agreeable. I also stated that she should write a letter describing her situation and explain why she has savings but is seeking FA. She liked this idea and will do this. She plans to submit [employer] payment today. Richard Perez MPH  WGVKR:[phone]  Email: [email or web address]

## 2023-11-06 ENCOUNTER — OFFICE VISIT (OUTPATIENT)
Dept: GYNECOLOGIC ONCOLOGY | Facility: CLINIC | Age: 33
End: 2023-11-06
Payer: COMMERCIAL

## 2023-11-06 VITALS
TEMPERATURE: 97.4 F | BODY MASS INDEX: 28.5 KG/M2 | SYSTOLIC BLOOD PRESSURE: 124 MMHG | DIASTOLIC BLOOD PRESSURE: 80 MMHG | OXYGEN SATURATION: 98 % | RESPIRATION RATE: 16 BRPM | HEIGHT: 67 IN | WEIGHT: 181.6 LBS | HEART RATE: 95 BPM

## 2023-11-06 DIAGNOSIS — C56.3 MALIGNANT NEOPLASM OF BOTH OVARIES (HCC): Primary | ICD-10-CM

## 2023-11-06 DIAGNOSIS — R91.8 ABNORMAL FINDINGS ON DIAGNOSTIC IMAGING OF LUNG: ICD-10-CM

## 2023-11-06 PROCEDURE — 99214 OFFICE O/P EST MOD 30 MIN: CPT | Performed by: OBSTETRICS & GYNECOLOGY

## 2023-11-06 NOTE — ASSESSMENT & PLAN NOTE
Lung findings are unclear as to the origin whether these are infectious or bleomycin induced. The patient has a follow-up appointment with pulmonology next week. A CAT scan will be reperformed in another few months. The patient is not limited by lung function at this time. It is likely that a pulmonary function test will be required however I have recommended deferring to the pulmonologist as to appropriateness of next tests.

## 2023-11-06 NOTE — ASSESSMENT & PLAN NOTE
Patient is in a clinical remission from her stage IIb endometrioid and yolk sac tumors. She has no evidence of recurrence of disease on CAT scan blood work or exam today. We are recommending follow-up visits every 3 months for 2 years and every 6 months for 3 years thereafter. Blood work including  and alpha-fetoprotein will be drawn prior to the visit. Intermittent CAT scans will be performed. The patient has lost her job due to cancer treatment. She is now advised to reapply. She is able to work now.

## 2023-11-06 NOTE — LETTER
November 6, 2023     Pablo Heart DO  [address] [place] [address]    Patient: Wesley Sood   YOB: 1990   Date of Visit: 11/6/2023       Dear Dr. Anaya Astudillo:    Thank you for referring Kandice Helms to me for evaluation. Below are my notes for this consultation. If you have questions, please do not hesitate to call me. I look forward to following your patient along with you. 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 Intra-Op findings revealed a large left adnexal mass approximately 12 to 14 cm densely adherent to the sigmoid colon. It ruptured releasing chocolate fluid into the abdomen however prior staining of the omentum indicated likely previous rupture. Final pathology report after extensive review by [facility] was most consistent with endometrioid adenocarcinoma initiating within endometriosis within the ovary. Additionally yolk sac tumor was identified which extended to the local:. Making this a stage IIB tumor.   Would recommend 4 cycles of bleomycin etoposide and cis-platinum. 7/11/2023 Initial Diagnosis    Malignant neoplasm of left ovary ([address])     7/11/2023 -  Cancer Staged    Staging form: Ovary, Fallopian Tube, Primary Peritoneal, AJCC 8th Edition  - Clinical stage from 7/11/2023: Stage IIB (cT2b, cN0, cM0) - Signed by Jaida Rangel MD on 7/11/2023  Histopathologic type:  Yolk sac tumor  Stage prefix: Initial diagnosis       7/31/2023 -  Chemotherapy    alteplase (CATHFLO), 2 mg, Intracatheter, Every 1 Minute as needed, 4 of 4 cycles  pegfilgrastim (NEULASTA ONPRO), 6 mg, Subcutaneous, Once, 4 of 4 cycles  Administration: 6 mg (8/7/2023), 6 mg (8/28/2023), 6 mg (9/25/2023), 6 mg (10/16/2023)  bleomycin (BLENOXANE) IVPB, 30 Units, Intravenous, Once, 4 of 4 cycles  Administration: 30 Units (7/31/2023), 30 Units (8/7/2023), 30 Units (8/14/2023), 30 Units (8/21/2023), 30 Units (8/28/2023), 30 Units (9/5/2023), 30 Units (9/25/2023), 30 Units (10/2/2023), 30 Units (10/16/2023), 30 Units (10/23/2023), 30 Units (9/18/2023), 30 Units (10/9/2023)  CISplatin (PLATINOL) infusion, 20 mg/m2 = 38.8 mg, Intravenous, Once, 4 of 4 cycles  Administration: 38.8 mg (7/31/2023), 38.8 mg (8/1/2023), 38.8 mg (8/21/2023), 38.8 mg (8/2/2023), 38.8 mg (8/3/2023), 38.8 mg (8/4/2023), 38.8 mg (8/22/2023), 38.8 mg (8/23/2023), 38.8 mg (8/24/2023), 38.8 mg (8/25/2023), 39 mg (9/18/2023), 39 mg (9/19/2023), 39 mg (9/20/2023), 39 mg (9/21/2023), 39 mg (9/22/2023), 39 mg (10/9/2023), 39 mg (10/10/2023), 39 mg (10/11/2023), 39 mg (10/12/2023), 39 mg (10/13/2023)  fosaprepitant (EMEND) IVPB, 150 mg, Intravenous, Once, 4 of 4 cycles  Administration: 150 mg (7/31/2023), 150 mg (8/21/2023), 150 mg (9/18/2023), 150 mg (10/9/2023), 150 mg (10/13/2023)  etoposide (TOPOSAR), 100 mg/m2 = 194 mg, Intravenous, Once, 4 of 4 cycles  Administration: 194 mg (7/31/2023), 194 mg (8/1/2023), 194 mg (8/21/2023), 194 mg (8/2/2023), 194 mg (8/3/2023), 194 mg (8/4/2023), 194 mg (8/22/2023), 194 mg (8/23/2023), 194 mg (8/24/2023), 194 mg (8/25/2023), 200 mg (9/18/2023), 200 mg (9/19/2023), 200 mg (9/20/2023), 200 mg (9/21/2023), 200 mg (9/22/2023), 200 mg (10/9/2023), 200 mg (10/10/2023), 200 mg (10/11/2023), 200 mg (10/12/2023), 200 mg (10/13/2023)           Patient ID: Domenico Funez is a 27 y.o. female  Patient is a very pleasant 75-year-old female with a history of stage IIb ovarian carcinoma concurrent with endometrioid subtype and yolk sac tumor. The patient underwent initial surgery with OCHOA/BSO and staging in June 2023. She completed 4 cycles of bleomycin etoposide and cis-platinum without significant difficulty. Patient underwent posttreatment CAT scan last week which reveals the following:  IMPRESSION:     New multifocal mixed groundglass and consolidative opacities suggest atypical multifocal pneumonia unless patient is on immunotherapy or chemotherapy with potential pulmonary side effects. Atypical organism is possible especially if patient is   immunosuppressed. The appearance would make metastatic disease much less likely although not entirely excluded. There is a 2.6 cm cyst with a thin wall abutting the right external iliac vasculature. This could represent lymphocele however a metastatic focus is not excluded given initial presentation of cystic neoplasm of the ovaries. The abdomen demonstrates no new ascites or adenopathy elsewhere. The patient is status post hysterectomy with mild stranding near the vaginal cuff which is most likely related to postop change although continued surveillance is advised. CT follow-up is suggested in 1 to 2 months time to ensure resolution of the chest findings. Follow-up CT of the pelvis could also be performed to reassess. The patient's tumor markers have normalized. Her alpha-fetoprotein has dropped from the 303-7 and her  is dropped from approximately 220-8. Today, the patient is doing well.   She denies significant abdominal pain, pelvic pain, nausea, vomiting, constipation, diarrhea, fevers, chills, or vaginal bleeding. She has recently been getting over a cold and attributes the findings on CAT scan to this. The patient does have an appointment with pulmonology set up. Today, the patient is doing well. She denies significant abdominal pain, pelvic pain, nausea, vomiting, constipation, diarrhea, fevers, chills, or vaginal bleeding. Right having a    The following portions of the patient's history were reviewed and updated as appropriate: allergies, current medications, past family history, past medical history, past social history, past surgical history, and problem list.    Review of Systems   Constitutional: Negative. HENT: Negative. Eyes: Negative. Respiratory: Negative. Cardiovascular: Negative. Gastrointestinal: Negative. Endocrine: Negative. Genitourinary: Negative. Musculoskeletal: Negative. Skin: Negative. Neurological: Negative. Hematological: Negative. Psychiatric/Behavioral: Negative.          Current Outpatient Medications   Medication Sig Dispense Refill   • cholecalciferol (VITAMIN D3) 1,000 units tablet Take 5,000 Units by mouth daily     • CRANIAL PROSTHESIS, RX, Apply for drug-induced alopecia 1 each 0   • LORazepam (ATIVAN) 1 mg tablet Take 1 tablet (1 mg total) by mouth every 6 (six) hours as needed for anxiety (or nausea) 36 tablet 1   • Multiple Vitamin (multivitamin) tablet Take 1 tablet by mouth daily     • ondansetron (ZOFRAN) 8 mg tablet Take 1 tablet (8 mg total) by mouth every 8 (eight) hours as needed for nausea or vomiting 30 tablet 1   • ondansetron (ZOFRAN-ODT) 8 mg disintegrating tablet Take 1 tablet (8 mg total) by mouth every 8 (eight) hours as needed for nausea or vomiting 20 tablet 0   • docusate sodium (COLACE) 100 mg capsule Take 1 capsule (100 mg total) by mouth 2 (two) times a day (Patient not taking: Reported on 11/6/2023) 60 capsule 0 No current facility-administered medications for this visit. Objective:    Blood pressure 124/80, pulse 95, temperature (!) 97.4 °F (36.3 °C), temperature source Temporal, resp. rate 16, height 5' 7.01" (1.702 m), weight 82.4 kg (181 lb 9.6 oz), last menstrual period 05/15/2023, SpO2 98 %. Body mass index is 28.43 kg/m². Body surface area is 1.94 meters squared. Physical Exam  Constitutional:       Appearance: She is well-developed. HENT:      Head: Normocephalic and atraumatic. Neck:      Thyroid: No thyromegaly. Cardiovascular:      Rate and Rhythm: Normal rate and regular rhythm. Heart sounds: Normal heart sounds. Pulmonary:      Effort: Pulmonary effort is normal.      Breath sounds: Normal breath sounds. Abdominal:      General: Bowel sounds are normal.      Palpations: Abdomen is soft. Comments: Well healed laparoscopic incisions. Genitourinary:     Comments: -Normal external female genitalia, normal Bartholin's and [place]'s glands                  -Normal midline urethral meatus. No lesions notes                  -Bladder without fullness mass or tenderness                  -Vagina without lesion or discharge No significant cystocele or rectocele noted                  -Cervix surgically absent                  -Uterus surgically absent                  -Adnexae surgically absent                  - Anus without fissure of lesion    Musculoskeletal:         General: Normal range of motion. Cervical back: Normal range of motion and neck supple. Lymphadenopathy:      Cervical: No cervical adenopathy. Skin:     General: Skin is warm and dry. Neurological:      Mental Status: She is alert and oriented to person, place, and time.    Psychiatric:         Behavior: Behavior normal.         Lab Results   Component Value Date     8.8 10/19/2023     Lab Results   Component Value Date    K 4.5 11/02/2023     11/02/2023    CO2 22 11/02/2023    BUN 10 11/02/2023 CREATININE 0.63 11/02/2023    GLUF 96 10/05/2023    CALCIUM 8.8 11/02/2023    CORRECTEDCA 10.0 06/02/2023    AST 19 11/02/2023    ALT 17 11/02/2023    ALKPHOS 61 11/02/2023    EGFR 117 11/02/2023     Lab Results   Component Value Date    WBC 14.15 (H) 11/02/2023    HGB 9.4 (L) 11/02/2023    HCT 30.3 (L) 11/02/2023     (H) 11/02/2023     (H) 11/02/2023     Lab Results   Component Value Date    NEUTROABS 4.14 10/12/2023        Trend:  Lab Results   Component Value Date     8.8 10/19/2023     9.9 10/05/2023     10.4 09/14/2023     9.6 09/07/2023     28.3 08/17/2023     25.4 07/29/2023     214.4 (H) 06/02/2023                   Amber Aggarwal MD  11/6/2023 11:01 AM  Incomplete  Assessment/Plan:    Problem List Items Addressed This Visit       Malignant neoplasm of ovary ([address]) - Primary    Relevant Orders    CT chest abdomen pelvis w contrast    BUN        Creatinine, serum    Ambulatory Referral to Interventional Radiology         CHIEF COMPLAINT: 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 Intra-Op findings revealed a large left adnexal mass approximately 12 to 14 cm densely adherent to the sigmoid colon. It ruptured releasing chocolate fluid into the abdomen however prior staining of the omentum indicated likely previous rupture.     Final pathology report after extensive review by [facility] was most consistent with endometrioid adenocarcinoma initiating within endometriosis within the ovary. Additionally yolk sac tumor was identified which extended to the local:. Making this a stage IIB tumor. Would recommend 4 cycles of bleomycin etoposide and cis-platinum. 7/11/2023 Initial Diagnosis    Malignant neoplasm of left ovary ([address])     7/11/2023 -  Cancer Staged    Staging form: Ovary, Fallopian Tube, Primary Peritoneal, AJCC 8th Edition  - Clinical stage from 7/11/2023: Stage IIB (cT2b, cN0, cM0) - Signed by Dakota Aquino MD on 7/11/2023  Histopathologic type:  Yolk sac tumor  Stage prefix: Initial diagnosis       7/31/2023 -  Chemotherapy    alteplase (CATHFLO), 2 mg, Intracatheter, Every 1 Minute as needed, 4 of 4 cycles  pegfilgrastim (NEULASTA ONPRO), 6 mg, Subcutaneous, Once, 4 of 4 cycles  Administration: 6 mg (8/7/2023), 6 mg (8/28/2023), 6 mg (9/25/2023), 6 mg (10/16/2023)  bleomycin (BLENOXANE) IVPB, 30 Units, Intravenous, Once, 4 of 4 cycles  Administration: 30 Units (7/31/2023), 30 Units (8/7/2023), 30 Units (8/14/2023), 30 Units (8/21/2023), 30 Units (8/28/2023), 30 Units (9/5/2023), 30 Units (9/25/2023), 30 Units (10/2/2023), 30 Units (10/16/2023), 30 Units (10/23/2023), 30 Units (9/18/2023), 30 Units (10/9/2023)  CISplatin (PLATINOL) infusion, 20 mg/m2 = 38.8 mg, Intravenous, Once, 4 of 4 cycles  Administration: 38.8 mg (7/31/2023), 38.8 mg (8/1/2023), 38.8 mg (8/21/2023), 38.8 mg (8/2/2023), 38.8 mg (8/3/2023), 38.8 mg (8/4/2023), 38.8 mg (8/22/2023), 38.8 mg (8/23/2023), 38.8 mg (8/24/2023), 38.8 mg (8/25/2023), 39 mg (9/18/2023), 39 mg (9/19/2023), 39 mg (9/20/2023), 39 mg (9/21/2023), 39 mg (9/22/2023), 39 mg (10/9/2023), 39 mg (10/10/2023), 39 mg (10/11/2023), 39 mg (10/12/2023), 39 mg (10/13/2023)  fosaprepitant (EMEND) IVPB, 150 mg, Intravenous, Once, 4 of 4 cycles  Administration: 150 mg (7/31/2023), 150 mg (8/21/2023), 150 mg (9/18/2023), 150 mg (10/9/2023), 150 mg (10/13/2023)  etoposide (TOPOSAR), 100 mg/m2 = 194 mg, Intravenous, Once, 4 of 4 cycles  Administration: 194 mg (7/31/2023), 194 mg (8/1/2023), 194 mg (8/21/2023), 194 mg (8/2/2023), 194 mg (8/3/2023), 194 mg (8/4/2023), 194 mg (8/22/2023), 194 mg (8/23/2023), 194 mg (8/24/2023), 194 mg (8/25/2023), 200 mg (9/18/2023), 200 mg (9/19/2023), 200 mg (9/20/2023), 200 mg (9/21/2023), 200 mg (9/22/2023), 200 mg (10/9/2023), 200 mg (10/10/2023), 200 mg (10/11/2023), 200 mg (10/12/2023), 200 mg (10/13/2023)           Patient ID: Scott Mckeon is a 27 y.o. female  Patient is a very pleasant 68-year-old female with a history of stage IIb ovarian carcinoma concurrent with endometrioid subtype and yolk sac tumor. The patient underwent initial surgery with OCHOA/BSO and staging in June 2023. She completed 4 cycles of bleomycin etoposide and cis-platinum without significant difficulty. Patient underwent posttreatment CAT scan last week which reveals the following:  IMPRESSION:     New multifocal mixed groundglass and consolidative opacities suggest atypical multifocal pneumonia unless patient is on immunotherapy or chemotherapy with potential pulmonary side effects. Atypical organism is possible especially if patient is   immunosuppressed. The appearance would make metastatic disease much less likely although not entirely excluded. There is a 2.6 cm cyst with a thin wall abutting the right external iliac vasculature. This could represent lymphocele however a metastatic focus is not excluded given initial presentation of cystic neoplasm of the ovaries. The abdomen demonstrates no new ascites or adenopathy elsewhere. The patient is status post hysterectomy with mild stranding near the vaginal cuff which is most likely related to postop change although continued surveillance is advised. CT follow-up is suggested in 1 to 2 months time to ensure resolution of the chest findings. Follow-up CT of the pelvis could also be performed to reassess.       The patient's tumor markers have normalized. Her alpha-fetoprotein has dropped from the 303-7 and her  is dropped from approximately 220-8. Today, the patient is doing well. She denies significant abdominal pain, pelvic pain, nausea, vomiting, constipation, diarrhea, fevers, chills, or vaginal bleeding. She has recently been getting over a cold and attributes the findings on CAT scan to this. The patient does have an appointment with pulmonology set up. Right having a    The following portions of the patient's history were reviewed and updated as appropriate: allergies, current medications, past family history, past medical history, past social history, past surgical history, and problem list.    Review of Systems    Current Outpatient Medications   Medication Sig Dispense Refill   • cholecalciferol (VITAMIN D3) 1,000 units tablet Take 5,000 Units by mouth daily     • CRANIAL PROSTHESIS, RX, Apply for drug-induced alopecia 1 each 0   • LORazepam (ATIVAN) 1 mg tablet Take 1 tablet (1 mg total) by mouth every 6 (six) hours as needed for anxiety (or nausea) 36 tablet 1   • Multiple Vitamin (multivitamin) tablet Take 1 tablet by mouth daily     • ondansetron (ZOFRAN) 8 mg tablet Take 1 tablet (8 mg total) by mouth every 8 (eight) hours as needed for nausea or vomiting 30 tablet 1   • ondansetron (ZOFRAN-ODT) 8 mg disintegrating tablet Take 1 tablet (8 mg total) by mouth every 8 (eight) hours as needed for nausea or vomiting 20 tablet 0   • docusate sodium (COLACE) 100 mg capsule Take 1 capsule (100 mg total) by mouth 2 (two) times a day (Patient not taking: Reported on 11/6/2023) 60 capsule 0     No current facility-administered medications for this visit. Objective:    Blood pressure 124/80, pulse 95, temperature (!) 97.4 °F (36.3 °C), temperature source Temporal, resp. rate 16, height 5' 7.01" (1.702 m), weight 82.4 kg (181 lb 9.6 oz), last menstrual period 05/15/2023, SpO2 98 %.   Body mass index is 28.43 kg/m². Body surface area is 1.94 meters squared.     Physical Exam    Lab Results   Component Value Date     8.8 10/19/2023     Lab Results   Component Value Date    K 4.5 11/02/2023     11/02/2023    CO2 22 11/02/2023    BUN 10 11/02/2023    CREATININE 0.63 11/02/2023    GLUF 96 10/05/2023    CALCIUM 8.8 11/02/2023    CORRECTEDCA 10.0 06/02/2023    AST 19 11/02/2023    ALT 17 11/02/2023    ALKPHOS 61 11/02/2023    EGFR 117 11/02/2023     Lab Results   Component Value Date    WBC 14.15 (H) 11/02/2023    HGB 9.4 (L) 11/02/2023    HCT 30.3 (L) 11/02/2023     (H) 11/02/2023     (H) 11/02/2023     Lab Results   Component Value Date    NEUTROABS 4.14 10/12/2023        Trend:  Lab Results   Component Value Date     8.8 10/19/2023     9.9 10/05/2023     10.4 09/14/2023     9.6 09/07/2023     28.3 08/17/2023     25.4 07/29/2023     214.4 (H) 06/02/2023

## 2023-11-06 NOTE — PROGRESS NOTES
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 She has no evidence of recurrence of disease on CAT scan blood work or exam today. We are recommending follow-up visits every 3 months for 2 years and every 6 months for 3 years thereafter. Blood work including  and alpha-fetoprotein will be drawn prior to the visit. Intermittent CAT scans will be performed. The patient has lost her job due to cancer treatment. She is now advised to reapply. She is able to work now. Relevant Orders    CT chest abdomen pelvis w contrast    BUN        Creatinine, serum    Ambulatory Referral to Interventional Radiology    Abnormal findings on diagnostic imaging of lung     Lung findings are unclear as to the origin whether these are infectious or bleomycin induced. The patient has a follow-up appointment with pulmonology next week. A CAT scan will be reperformed in another few months. The patient is not limited by lung function at this time. It is likely that a pulmonary function test will be required however I have recommended deferring to the pulmonologist as to appropriateness of next tests. 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   Intra-Op findings revealed a large left adnexal mass approximately 12 to 14 cm densely adherent to the sigmoid colon. It ruptured releasing chocolate fluid into the abdomen however prior staining of the omentum indicated likely previous rupture. Final pathology report [facility] was most consistent with endometrioid adenocarcinoma initiating within endometriosis within the ovary. Additionally yolk sac tumor was identified which extended to the local:. Making this a stage IIB tumor. Would recommend 4 cycles of bleomycin etoposide and cis-platinum. 7/11/2023 Initial Diagnosis    Malignant neoplasm of left ovary ([address])     7/11/2023 -  Cancer Staged    Staging form: Ovary, Fallopian Tube, Primary Peritoneal, AJCC 8th Edition  - Clinical stage from 7/11/2023: Stage IIB (cT2b, cN0, cM0) - Signed by Janis Arce MD on 7/11/2023  Histopathologic type:  Yolk sac tumor  Stage prefix: Initial diagnosis       7/31/2023 -  Chemotherapy    alteplase (CATHFLO), 2 mg, Intracatheter, Every 1 Minute as needed, 4 of 4 cycles  pegfilgrastim (NEULASTA ONPRO), 6 mg, Subcutaneous, Once, 4 of 4 cycles  Administration: 6 mg (8/7/2023), 6 mg (8/28/2023), 6 mg (9/25/2023), 6 mg (10/16/2023)  bleomycin (BLENOXANE) IVPB, 30 Units, Intravenous, Once, 4 of 4 cycles  Administration: 30 Units (7/31/2023), 30 Units (8/7/2023), 30 Units (8/14/2023), 30 Units (8/21/2023), 30 Units (8/28/2023), 30 Units (9/5/2023), 30 Units (9/25/2023), 30 Units (10/2/2023), 30 Units (10/16/2023), 30 Units (10/23/2023), 30 Units (9/18/2023), 30 Units (10/9/2023)  CISplatin (PLATINOL) infusion, 20 mg/m2 = 38.8 mg, Intravenous, Once, 4 of 4 cycles  Administration: 38.8 mg (7/31/2023), 38.8 mg (8/1/2023), 38.8 mg (8/21/2023), 38.8 mg (8/2/2023), 38.8 mg (8/3/2023), 38.8 mg (8/4/2023), 38.8 mg (8/22/2023), 38.8 mg (8/23/2023), 38.8 mg (8/24/2023), 38.8 mg (8/25/2023), 39 mg (9/18/2023), 39 mg (9/19/2023), 39 mg (9/20/2023), 39 mg (9/21/2023), 39 mg (9/22/2023), 39 mg (10/9/2023), 39 mg (10/10/2023), 39 mg (10/11/2023), 39 mg (10/12/2023), 39 mg (10/13/2023)  fosaprepitant (EMEND) IVPB, 150 mg, Intravenous, Once, 4 of 4 cycles  Administration: 150 mg (7/31/2023), 150 mg (8/21/2023), 150 mg (9/18/2023), 150 mg (10/9/2023), 150 mg (10/13/2023)  etoposide (TOPOSAR), 100 mg/m2 = 194 mg, Intravenous, Once, 4 of 4 cycles  Administration: 194 mg (7/31/2023), 194 mg (8/1/2023), 194 mg (8/21/2023), 194 mg (8/2/2023), 194 mg (8/3/2023), 194 mg (8/4/2023), 194 mg (8/22/2023), 194 mg (8/23/2023), 194 mg (8/24/2023), 194 mg (8/25/2023), 200 mg (9/18/2023), 200 mg (9/19/2023), 200 mg (9/20/2023), 200 mg (9/21/2023), 200 mg (9/22/2023), 200 mg (10/9/2023), 200 mg (10/10/2023), 200 mg (10/11/2023), 200 mg (10/12/2023), 200 mg (10/13/2023)           Patient ID: Darrell Vikcers is a 27 y.o. female  Patient is a very pleasant 25-year-old female with a history of stage IIb ovarian carcinoma concurrent with endometrioid subtype and yolk sac tumor. The patient underwent initial surgery with OCHOA/BSO and staging in June 2023. She completed 4 cycles of bleomycin etoposide and cis-platinum without significant difficulty. Patient underwent posttreatment CAT scan last week which reveals the following:  IMPRESSION:     New multifocal mixed groundglass and consolidative opacities suggest atypical multifocal pneumonia unless patient is on immunotherapy or chemotherapy with potential pulmonary side effects. Atypical organism is possible especially if patient is   immunosuppressed. The appearance would make metastatic disease much less likely although not entirely excluded. There is a 2.6 cm cyst with a thin wall abutting the right external iliac vasculature. This could represent lymphocele however a metastatic focus is not excluded given initial presentation of cystic neoplasm of the ovaries. The abdomen demonstrates no new ascites or adenopathy elsewhere.      The patient is status post hysterectomy with mild stranding near the vaginal cuff which is most likely related to postop change although continued surveillance is advised. CT follow-up is suggested in 1 to 2 months time to ensure resolution of the chest findings. Follow-up CT of the pelvis could also be performed to reassess. The patient's tumor markers have normalized. Her alpha-fetoprotein has dropped from the 303-7 and her  is dropped from approximately 220-8. Today, the patient is doing well. She denies significant abdominal pain, pelvic pain, nausea, vomiting, constipation, diarrhea, fevers, chills, or vaginal bleeding. She has recently been getting over a cold and attributes the findings on CAT scan to this. The patient does have an appointment with pulmonology set up. Today, the patient is doing well. She denies significant abdominal pain, pelvic pain, nausea, vomiting, constipation, diarrhea, fevers, chills, or vaginal bleeding. Right having a    The following portions of the patient's history were reviewed and updated as appropriate: allergies, current medications, past family history, past medical history, past social history, past surgical history, and problem list.    Review of Systems   Constitutional: Negative. HENT: Negative. Eyes: Negative. Respiratory: Negative. Cardiovascular: Negative. Gastrointestinal: Negative. Endocrine: Negative. Genitourinary: Negative. Musculoskeletal: Negative. Skin: Negative. Neurological: Negative. Hematological: Negative. Psychiatric/Behavioral: Negative.          Current Outpatient Medications   Medication Sig Dispense Refill    cholecalciferol (VITAMIN D3) 1,000 units tablet Take 5,000 Units by mouth daily      CRANIAL PROSTHESIS, RX, Apply for drug-induced alopecia 1 each 0    LORazepam (ATIVAN) 1 mg tablet Take 1 tablet (1 mg total) by mouth every 6 (six) hours as needed for anxiety (or nausea) 36 tablet 1    Multiple Vitamin (multivitamin) tablet Take 1 tablet by mouth daily      ondansetron (ZOFRAN) 8 mg tablet Take 1 tablet (8 mg total) by mouth every 8 (eight) hours as needed for nausea or vomiting 30 tablet 1    ondansetron (ZOFRAN-ODT) 8 mg disintegrating tablet Take 1 tablet (8 mg total) by mouth every 8 (eight) hours as needed for nausea or vomiting 20 tablet 0    docusate sodium (COLACE) 100 mg capsule Take 1 capsule (100 mg total) by mouth 2 (two) times a day (Patient not taking: Reported on 11/6/2023) 60 capsule 0     No current facility-administered medications for this visit. Objective:    Blood pressure 124/80, pulse 95, temperature (!) 97.4 °F (36.3 °C), temperature source Temporal, resp. rate 16, height 5' 7.01" (1.702 m), weight 82.4 kg (181 lb 9.6 oz), last menstrual period 05/15/2023, SpO2 98 %. Body mass index is 28.43 kg/m². Body surface area is 1.94 meters squared. Physical Exam  Constitutional:       Appearance: She is well-developed. HENT:      Head: Normocephalic and atraumatic. Neck:      Thyroid: No thyromegaly. Cardiovascular:      Rate and Rhythm: Normal rate and regular rhythm. Heart sounds: Normal heart sounds. Pulmonary:      Effort: Pulmonary effort is normal.      Breath sounds: Normal breath sounds. Abdominal:      General: Bowel sounds are normal.      Palpations: Abdomen is soft. Comments: Well healed laparoscopic incisions. Genitourinary:     Comments: -Normal external female genitalia, normal Bartholin's and Black's glands                  -Normal midline urethral meatus. No lesions notes                  -Bladder without fullness mass or tenderness                  -Vagina without lesion or discharge No significant cystocele or rectocele noted                  -Cervix surgically absent                  -Uterus surgically absent                  -Adnexae surgically absent                  - Anus without fissure of lesion    Musculoskeletal:         General: Normal range of motion.       Cervical back: Normal range of motion and neck supple. Lymphadenopathy:      Cervical: No cervical adenopathy. Skin:     General: Skin is warm and dry. Neurological:      Mental Status: She is alert and oriented to person, place, and time.    Psychiatric:         Behavior: Behavior normal.         Lab Results   Component Value Date     8.8 10/19/2023     Lab Results   Component Value Date    K 4.5 11/02/2023     11/02/2023    CO2 22 11/02/2023    BUN 10 11/02/2023    CREATININE 0.63 11/02/2023    GLUF 96 10/05/2023    CALCIUM 8.8 11/02/2023    CORRECTEDCA 10.0 06/02/2023    AST 19 11/02/2023    ALT 17 11/02/2023    ALKPHOS 61 11/02/2023    EGFR 117 11/02/2023     Lab Results   Component Value Date    WBC 14.15 (H) 11/02/2023    HGB 9.4 (L) 11/02/2023    HCT 30.3 (L) 11/02/2023     (H) 11/02/2023     (H) 11/02/2023     Lab Results   Component Value Date    NEUTROABS 4.14 10/12/2023        Trend:  Lab Results   Component Value Date     8.8 10/19/2023     9.9 10/05/2023     10.4 09/14/2023     9.6 09/07/2023     28.3 08/17/2023     25.4 07/29/2023     214.4 (H) 06/02/2023

## 2023-11-06 NOTE — LETTER
November 6, 2023     Patient: Scott Mckeon  YOB: 1990  Date of Visit: 11/6/2023      To Whom it May Concern:    Yamile Busby is under my professional care. Piedad Rayo was seen in my office on 11/6/2023. Piedad Rayo may return to work on 11/6/2023 . If you have any questions or concerns, please don't hesitate to call.          Sincerely,          Amber Marquez MD        CC: No Recipients

## 2023-11-09 ENCOUNTER — APPOINTMENT (OUTPATIENT)
Dept: LAB | Facility: CLINIC | Age: 33
End: 2023-11-09

## 2023-11-09 DIAGNOSIS — C56.2 MALIGNANT NEOPLASM OF LEFT OVARY (HCC): ICD-10-CM

## 2023-11-09 LAB
AFP-TM SERPL-MCNC: 5.04 NG/ML (ref 0–9)
CANCER AG125 SERPL-ACNC: 9.2 U/ML (ref 0–35)

## 2023-11-09 PROCEDURE — 82105 ALPHA-FETOPROTEIN SERUM: CPT

## 2023-11-09 PROCEDURE — 36415 COLL VENOUS BLD VENIPUNCTURE: CPT

## 2023-11-09 PROCEDURE — 86304 IMMUNOASSAY TUMOR CA 125: CPT

## 2023-11-13 ENCOUNTER — CONSULT (OUTPATIENT)
Dept: PULMONOLOGY | Facility: CLINIC | Age: 33
End: 2023-11-13
Payer: COMMERCIAL

## 2023-11-13 VITALS
BODY MASS INDEX: 29 KG/M2 | SYSTOLIC BLOOD PRESSURE: 118 MMHG | RESPIRATION RATE: 18 BRPM | HEIGHT: 67 IN | WEIGHT: 184.8 LBS | DIASTOLIC BLOOD PRESSURE: 82 MMHG | OXYGEN SATURATION: 98 % | HEART RATE: 83 BPM

## 2023-11-13 DIAGNOSIS — R91.8 ABNORMAL FINDINGS ON DIAGNOSTIC IMAGING OF LUNG: Primary | ICD-10-CM

## 2023-11-13 DIAGNOSIS — J98.4 BLEOMYCIN LUNG TOXICITY: ICD-10-CM

## 2023-11-13 DIAGNOSIS — C56.9 MALIGNANT NEOPLASM OF OVARY, UNSPECIFIED LATERALITY (HCC): ICD-10-CM

## 2023-11-13 DIAGNOSIS — Z72.89 ENGAGES IN VAPING: ICD-10-CM

## 2023-11-13 DIAGNOSIS — T45.1X5A BLEOMYCIN LUNG TOXICITY: ICD-10-CM

## 2023-11-13 DIAGNOSIS — D64.81 ANEMIA DUE TO ANTINEOPLASTIC CHEMOTHERAPY: ICD-10-CM

## 2023-11-13 DIAGNOSIS — T45.1X5A ANEMIA DUE TO ANTINEOPLASTIC CHEMOTHERAPY: ICD-10-CM

## 2023-11-13 PROCEDURE — 99244 OFF/OP CNSLTJ NEW/EST MOD 40: CPT | Performed by: INTERNAL MEDICINE

## 2023-11-13 PROCEDURE — 94618 PULMONARY STRESS TESTING: CPT

## 2023-11-13 NOTE — H&P (VIEW-ONLY)
Consultation - Pulmonary Medicine   Refugio Quick 27 y.o. female MRN: [id number]    Physician Requesting Consult: Jt Mai PA-C   Reason for Consult: Abnormal chest CT    Abnormal findings on diagnostic imaging of lung  I reviewed the CT scan with the patient, differential diagnoses include multifocal pneumonia that could be viral or bacterial especially with her recent history of shortness of breath and cough and URI but improving but also could be secondary to chemotherapy induced pneumonitis. Fortunately patient completed her chemotherapy regimen about 2-3 weeks ago and currently she feels better in general.  She still not back to baseline and has mild dyspnea on exertion and some fatigue but that could be multifactorial.  At this point since the chemotherapy is stopped and she is improving I believe no indication to start any therapy with antibiotics or corticosteroids for the 2 conditions described above. We decided to monitor clinically. I did 6-minute walk test and she did well as documented. We will help to monitor in the future if she has worsening symptoms. Since her  film on the CT scan showed the infiltrate so I will repeat regular chest x-ray in 2 weeks. We will continue observation for now until the next CT scan in 2 months ordered by GYN and I will follow on that. Patient will call me if her symptoms does not improve in the next few weeks or if she gets any worsening or new symptoms so we can reevaluate earlier. I provided incentive spirometry and explained how to use correctly and encouraged her to use more frequently.     Malignant neoplasm of ovary ([address])  Treated with chemotherapy as described in HPI, continue follow-up with gynecology oncology    Anemia due to antineoplastic chemotherapy  Probably playing a role in her dyspnea and fatigue, already improving on most recent labs, encourage patient to have healthy nutrition and her hemoglobin should be back to normal since she is off chemotherapy now. Engages in [phone]  Although less likely this is a vaping induced lung injury but encourage patient to stop vaping at least for the next several weeks to monitor the response. In general she would be better off vaping completely and encourage if she needs medical marijuana to try to use other edible substances. Return in about 3 months (around 2/13/2024). Diagnoses and all orders for this visit:    Abnormal findings on diagnostic imaging of lung  -     POCT 6 minute walk  -     XR chest pa & lateral; Future    Malignant neoplasm of ovary, unspecified laterality ([address])  -     Ambulatory Referral to Pulmonology    Bleomycin lung toxicity  -     Ambulatory Referral to Pulmonology    Anemia due to antineoplastic chemotherapy      Patient will message me directly via E-[employer] or call the office if she has any deterioration or worsening of her symptoms. ______________________________________________________________________    HPI:    Preeti Hurst is a 27 y.o. female who presents for evaluation of abnormal chest CT scan. Patient was diagnosed with stage II ovarian cancer in May of this year after having abdominal distention and the CT scan showed large ovarian cyst, she had total hysterectomy with bilateral oophorectomy followed by chemotherapy over the past few months with: Bleomycin, cisplatin, fosaprepitant, and etoposide. Her last chemotherapy was on 10/23. She had a chest abdomen and pelvis CT scan follow-up that showed infiltrates on her lungs and so was referred for evaluation. Patient states that more than 2 weeks ago started to have URI symptoms including cough and shortness of breath without fever or chills, she had some wheezing as well. She denies sick contact. She also has fatigue that she attributes to chemotherapy. She feels some improvement over the past 2 weeks but still not back to her baseline before the diagnosis of ovarian cancer.   Denies significant cough or sputum production. She has no history of pulmonary disease. Patient lives alone, she has cats, she denies occupational exposure. She does not smoke cigarettes but she vapes marijuana from time to time. Review of Systems:  Review of Systems   Constitutional:  Positive for fatigue. HENT: Negative. Eyes: Negative. Respiratory:  Positive for shortness of breath. Cardiovascular: Negative. Gastrointestinal: Negative. Endocrine: Negative. Genitourinary: Negative. Musculoskeletal: Negative. Skin: Negative. Allergic/Immunologic: Negative. Neurological: Negative. Hematological: Negative. Psychiatric/Behavioral: Negative. Aside from what is mentioned in the HPI, the review of systems otherwise negative.     Current Medications:    Current Outpatient Medications:     cholecalciferol (VITAMIN D3) 1,000 units tablet, Take 5,000 Units by mouth daily, Disp: , Rfl:     CRANIAL PROSTHESIS, RX,, Apply for drug-induced alopecia, Disp: 1 each, Rfl: 0    LORazepam (ATIVAN) 1 mg tablet, Take 1 tablet (1 mg total) by mouth every 6 (six) hours as needed for anxiety (or nausea), Disp: 36 tablet, Rfl: 1    docusate sodium (COLACE) 100 mg capsule, Take 1 capsule (100 mg total) by mouth 2 (two) times a day (Patient not taking: Reported on 11/6/2023), Disp: 60 capsule, Rfl: 0    Multiple Vitamin (multivitamin) tablet, Take 1 tablet by mouth daily (Patient not taking: Reported on 11/13/2023), Disp: , Rfl:     ondansetron (ZOFRAN) 8 mg tablet, Take 1 tablet (8 mg total) by mouth every 8 (eight) hours as needed for nausea or vomiting (Patient not taking: Reported on 11/13/2023), Disp: 30 tablet, Rfl: 1    ondansetron (ZOFRAN-ODT) 8 mg disintegrating tablet, Take 1 tablet (8 mg total) by mouth every 8 (eight) hours as needed for nausea or vomiting (Patient not taking: Reported on 11/13/2023), Disp: 20 tablet, Rfl: 0    Historical Information   Past Medical History:   Diagnosis Date    No known health problems     PONV (postoperative nausea and vomiting)      Past Surgical History:   Procedure Laterality Date    HYSTERECTOMY N/A 6/13/2023    Procedure: OCHOA/BSO, STAGING radical omentectomy bilateral pelvic lymph node dissection;  Surgeon: Kaylah Harrison MD;  Location: [facility];  Service: Gynecology Oncology    IR PORT PLACEMENT  7/24/2023    ORIF TIBIA & FIBULA FRACTURES Right     AR EXPLORATORY LAPAROTOMY CELIOTOMY W/WO BIOPSY SPX N/A 6/13/2023    Procedure: EX LAP;  Surgeon: Kaylah Harrison MD;  Location: [facility];  Service: Gynecology Oncology    URETERAL STENT PLACEMENT Bilateral 6/13/2023    Procedure: Edgardo Back; STENT URETERAL;  Surgeon: Marleny López MD;  Location: [facility];  Service: Urology    WISDOM TOOTH EXTRACTION       Social History   Social History     Tobacco Use   Smoking Status Never   Smokeless Tobacco Never       Occupational history:  No occupational exposure    Family History:   Family History   Problem Relation Age of Onset    Ovarian cysts Mother     No Known Problems Father          PhysicalExamination:  Vitals:   /82 (BP Location: Left arm, Patient Position: Sitting, Cuff Size: Large)   Pulse 83   Resp 18   Ht 5' 7.01" (1.702 m)   Wt 83.8 kg (184 lb 12.8 oz)   LMP 05/15/2023 (Exact Date)   SpO2 98%   BMI 28.94 kg/m²     Gen:  Comfortable on room air. No conversational dyspnea  HEENT:  Conjugate gaze. sclerae anicteric. Oropharynx moist  Neck: Trachea is midline. No JVD. No adenopathy  Chest: Equal breath sounds and clear to auscultation bilaterally, no crackles or wheezing  Cardiac: S1-S2 regular. no murmur  Abdomen:  benign  Extremities: No edema  Neuro:  Normal speech and mentation      Diagnostic Data:  Labs:   I personally reviewed the most recent laboratory data pertinent to today's visit    Lab Results   Component Value Date    WBC 5.15 11/09/2023    HGB 10.1 (L) 11/09/2023    HCT 31.1 (L) 11/09/2023    MCV 97 11/09/2023     11/09/2023     Lab Results   Component Value Date    CALCIUM 9.1 11/09/2023    K 3.9 11/09/2023    CO2 24 11/09/2023     11/09/2023    BUN 9 11/09/2023    CREATININE 0.68 11/09/2023     No results found for: "IGE"  Lab Results   Component Value Date    ALT 24 11/09/2023    AST 24 11/09/2023    ALKPHOS 54 11/09/2023       PFT results: The most recent pulmonary function tests were reviewed. Normal spirometry     No significant response to the administration to bronchodilator per ATS standards     Increased lung volumes indicative of air trapping     Normal diffusion capacity     Flow volume loop is normal.    Imaging:  I personally reviewed the images on the [facility] system pertinent to today's visit  Chest CT scan reviewed on PACS with the patient in the room: Patchy area of groundglass opacification and densities scattered throughout both lungs, no masses or nodules and no large consolidation. Chest x-ray from June reviewed on PACS: Clear lungs    Abdominal CT scan from May reviewed on PACS: Bases of lungs appear normal and clear with no abnormalities. Large ovarian cystic lesion with hydroureteronephrosis specially of the left kidney    Other studies:  Final Diagnosis   A. 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  - Fallopian tube with no pathologic abnormality. 6-minute walk test:  patient started with pulse ox 98% on room air at rest and heart rate 83/min. She was able to walk total 6 minutes / 406 m without stopping and without desaturation.   Her pulse ox remained 93-96% on room air and heart rate increased to maximal 115/min    Yovani Pelletier MD

## 2023-11-13 NOTE — ASSESSMENT & PLAN NOTE
Probably playing a role in her dyspnea and fatigue, already improving on most recent labs, encourage patient to have healthy nutrition and her hemoglobin should be back to normal since she is off chemotherapy now.

## 2023-11-13 NOTE — ASSESSMENT & PLAN NOTE
I reviewed the CT scan with the patient, differential diagnoses include multifocal pneumonia that could be viral or bacterial especially with her recent history of shortness of breath and cough and URI but improving but also could be secondary to chemotherapy induced pneumonitis. Fortunately patient completed her chemotherapy regimen about 2-3 weeks ago and currently she feels better in general.  She still not back to baseline and has mild dyspnea on exertion and some fatigue but that could be multifactorial.  At this point since the chemotherapy is stopped and she is improving I believe no indication to start any therapy with antibiotics or corticosteroids for the 2 conditions described above. We decided to monitor clinically. I did 6-minute walk test and she did well as documented. We will help to monitor in the future if she has worsening symptoms. Since her  film on the CT scan showed the infiltrate so I will repeat regular chest x-ray in 2 weeks. We will continue observation for now until the next CT scan in 2 months ordered by GYN and I will follow on that. Patient will call me if her symptoms does not improve in the next few weeks or if she gets any worsening or new symptoms so we can reevaluate earlier. I provided incentive spirometry and explained how to use correctly and encouraged her to use more frequently.

## 2023-11-13 NOTE — ASSESSMENT & PLAN NOTE
Although less likely this is a vaping induced lung injury but encourage patient to stop vaping at least for the next several weeks to monitor the response. In general she would be better off vaping completely and encourage if she needs medical marijuana to try to use other edible substances.

## 2023-11-13 NOTE — PROGRESS NOTES
Consultation - Pulmonary Medicine   Daisha Marc 27 y.o. female MRN: [id number]    Physician Requesting Consult: Angie Perales PA-C   Reason for Consult: Abnormal chest CT    Abnormal findings on diagnostic imaging of lung  I reviewed the CT scan with the patient, differential diagnoses include multifocal pneumonia that could be viral or bacterial especially with her recent history of shortness of breath and cough and URI but improving but also could be secondary to chemotherapy induced pneumonitis. Fortunately patient completed her chemotherapy regimen about 2-3 weeks ago and currently she feels better in general.  She still not back to baseline and has mild dyspnea on exertion and some fatigue but that could be multifactorial.  At this point since the chemotherapy is stopped and she is improving I believe no indication to start any therapy with antibiotics or corticosteroids for the 2 conditions described above. We decided to monitor clinically. I did 6-minute walk test and she did well as documented. We will help to monitor in the future if she has worsening symptoms. Since her  film on the CT scan showed the infiltrate so I will repeat regular chest x-ray in 2 weeks. We will continue observation for now until the next CT scan in 2 months ordered by GYN and I will follow on that. Patient will call me if her symptoms does not improve in the next few weeks or if she gets any worsening or new symptoms so we can reevaluate earlier. I provided incentive spirometry and explained how to use correctly and encouraged her to use more frequently.     Malignant neoplasm of ovary ([address])  Treated with chemotherapy as described in HPI, continue follow-up with gynecology oncology    Anemia due to antineoplastic chemotherapy  Probably playing a role in her dyspnea and fatigue, already improving on most recent labs, encourage patient to have healthy nutrition and her hemoglobin should be back to normal since she is off chemotherapy now. Engages in [address]  Although less likely this is a vaping induced lung injury but encourage patient to stop vaping at least for the next several weeks to monitor the response. In general she would be better off vaping completely and encourage if she needs medical marijuana to try to use other edible substances. Return in about 3 months (around 2/13/2024). Diagnoses and all orders for this visit:    Abnormal findings on diagnostic imaging of lung  -     POCT 6 minute walk  -     XR chest pa & lateral; Future    Malignant neoplasm of ovary, unspecified laterality ([address])  -     Ambulatory Referral to Pulmonology    Bleomycin lung toxicity  -     Ambulatory Referral to Pulmonology    Anemia due to antineoplastic chemotherapy      Patient will message me directly via [employer] or call the office if she has any deterioration or worsening of her symptoms. ______________________________________________________________________    HPI:    Daisha Marc is a 27 y.o. female who presents for evaluation of abnormal chest CT scan. Patient was diagnosed with stage II ovarian cancer in May of this year after having abdominal distention and the CT scan showed large ovarian cyst, she had total hysterectomy with bilateral oophorectomy followed by chemotherapy over the past few months with: Bleomycin, cisplatin, fosaprepitant, and etoposide. Her last chemotherapy was on 10/23. She had a chest abdomen and pelvis CT scan follow-up that showed infiltrates on her lungs and so was referred for evaluation. Patient states that more than 2 weeks ago started to have URI symptoms including cough and shortness of breath without fever or chills, she had some wheezing as well. She denies sick contact. She also has fatigue that she attributes to chemotherapy. She feels some improvement over the past 2 weeks but still not back to her baseline before the diagnosis of ovarian cancer.   Denies significant cough or sputum production. She has no history of pulmonary disease. Patient lives alone, she has cats, she denies occupational exposure. She does not smoke cigarettes but she vapes marijuana from time to time. Review of Systems:  Review of Systems   Constitutional:  Positive for fatigue. HENT: Negative. Eyes: Negative. Respiratory:  Positive for shortness of breath. Cardiovascular: Negative. Gastrointestinal: Negative. Endocrine: Negative. Genitourinary: Negative. Musculoskeletal: Negative. Skin: Negative. Allergic/Immunologic: Negative. Neurological: Negative. Hematological: Negative. Psychiatric/Behavioral: Negative. Aside from what is mentioned in the HPI, the review of systems otherwise negative.     Current Medications:    Current Outpatient Medications:     cholecalciferol (VITAMIN D3) 1,000 units tablet, Take 5,000 Units by mouth daily, Disp: , Rfl:     CRANIAL PROSTHESIS, RX,, Apply for drug-induced alopecia, Disp: 1 each, Rfl: 0    LORazepam (ATIVAN) 1 mg tablet, Take 1 tablet (1 mg total) by mouth every 6 (six) hours as needed for anxiety (or nausea), Disp: 36 tablet, Rfl: 1    docusate sodium (COLACE) 100 mg capsule, Take 1 capsule (100 mg total) by mouth 2 (two) times a day (Patient not taking: Reported on 11/6/2023), Disp: 60 capsule, Rfl: 0    Multiple Vitamin (multivitamin) tablet, Take 1 tablet by mouth daily (Patient not taking: Reported on 11/13/2023), Disp: , Rfl:     ondansetron (ZOFRAN) 8 mg tablet, Take 1 tablet (8 mg total) by mouth every 8 (eight) hours as needed for nausea or vomiting (Patient not taking: Reported on 11/13/2023), Disp: 30 tablet, Rfl: 1    ondansetron (ZOFRAN-ODT) 8 mg disintegrating tablet, Take 1 tablet (8 mg total) by mouth every 8 (eight) hours as needed for nausea or vomiting (Patient not taking: Reported on 11/13/2023), Disp: 20 tablet, Rfl: 0    Historical Information   Past Medical History:   Diagnosis Date    No known health problems     PONV (postoperative nausea and vomiting)      Past Surgical History:   Procedure Laterality Date    HYSTERECTOMY N/A 6/13/2023    Procedure: OCHOA/BSO, STAGING radical omentectomy bilateral pelvic lymph node dissection;  Surgeon: Lora Reece MD;  [facility];  Service: Gynecology Oncology    IR PORT PLACEMENT  7/24/2023    ORIF TIBIA & FIBULA FRACTURES Right     PA EXPLORATORY LAPAROTOMY CELIOTOMY W/WO BIOPSY SPX N/A 6/13/2023    Procedure: EX LAP;  Surgeon: Lora Reece MD;  [facility];  Service: Gynecology Oncology    URETERAL STENT PLACEMENT Bilateral 6/13/2023    Procedure: Shar Valenzuela; STENT URETERAL;  Surgeon: Tamara Padron MD;  [facility];  Service: Urology    WISDOM TOOTH EXTRACTION       Social History   Social History     Tobacco Use   Smoking Status Never   Smokeless Tobacco Never       Occupational history:  No occupational exposure    Family History:   Family History   Problem Relation Age of Onset    Ovarian cysts Mother     No Known Problems Father          PhysicalExamination:  Vitals:   /82 (BP Location: Left arm, Patient Position: Sitting, Cuff Size: Large)   Pulse 83   Resp 18   Ht 5' 7.01" (1.702 m)   Wt 83.8 kg (184 lb 12.8 oz)   LMP 05/15/2023 (Exact Date)   SpO2 98%   BMI 28.94 kg/m²     Gen:  Comfortable on room air. No conversational dyspnea  HEENT:  Conjugate gaze. sclerae anicteric. Oropharynx moist  Neck: Trachea is midline. No JVD. No adenopathy  Chest: Equal breath sounds and clear to auscultation bilaterally, no crackles or wheezing  Cardiac: S1-S2 regular. no murmur  Abdomen:  benign  Extremities: No edema  Neuro:  Normal speech and mentation      Diagnostic Data:  Labs:   I personally reviewed the most recent laboratory data pertinent to today's visit    Lab Results   Component Value Date    WBC 5.15 11/09/2023    HGB 10.1 (L) 11/09/2023    HCT 31.1 (L) 11/09/2023    MCV 97 11/09/2023     11/09/2023     Lab Results   Component Value Date    CALCIUM 9.1 11/09/2023    K 3.9 11/09/2023    CO2 24 11/09/2023     11/09/2023    BUN 9 11/09/2023    CREATININE 0.68 11/09/2023     No results found for: "IGE"  Lab Results   Component Value Date    ALT 24 11/09/2023    AST 24 11/09/2023    ALKPHOS 54 11/09/2023       PFT results: The most recent pulmonary function tests were reviewed. Normal spirometry     No significant response to the administration to bronchodilator per ATS standards     Increased lung volumes indicative of air trapping     Normal diffusion capacity     Flow volume loop is normal.    Imaging:  I personally reviewed the images on the [facility] system pertinent to today's visit  Chest CT scan reviewed on PACS with the patient in the room: Patchy area of groundglass opacification and densities scattered throughout both lungs, no masses or nodules and no large consolidation. Chest x-ray from June reviewed on PACS: Clear lungs    Abdominal CT scan from May reviewed on PACS: Bases of lungs appear normal and clear with no abnormalities. Large ovarian cystic lesion with hydroureteronephrosis specially of the left kidney    Other studies:  Final Diagnosis   A. 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  - Fallopian tube with no pathologic abnormality. 6-minute walk test:  patient started with pulse ox 98% on room air at rest and heart rate 83/min. She was able to walk total 6 minutes / 406 m without stopping and without desaturation.   Her pulse ox remained 93-96% on room air and heart rate increased to maximal 115/min    Nickolas Covarrubias MD

## 2023-11-14 ENCOUNTER — TELEPHONE (OUTPATIENT)
Dept: INTERVENTIONAL RADIOLOGY/VASCULAR | Facility: HOSPITAL | Age: 33
End: 2023-11-14

## 2023-11-15 ENCOUNTER — PATIENT OUTREACH (OUTPATIENT)
Dept: HEMATOLOGY ONCOLOGY | Facility: CLINIC | Age: 33
End: 2023-11-15

## 2023-11-15 ENCOUNTER — TELEPHONE (OUTPATIENT)
Dept: INTERVENTIONAL RADIOLOGY/VASCULAR | Facility: HOSPITAL | Age: 33
End: 2023-11-15

## 2023-11-15 NOTE — PROGRESS NOTES
PT called to discuss elvia care application status, work status (still unemployed), and COBRA status. PT met deductible at prior health plan so is planning on using COBRA for November appts. Does not have any December appts. Many appts in January including CT. She is hoping to be employed by then. We plan to touch base and evaluate if MA is needed to be applied for and to check in on elvia care. Momo Landry MPH  TZVZL:[phone]  Email: [email or web address]

## 2023-11-16 ENCOUNTER — PATIENT OUTREACH (OUTPATIENT)
Dept: HEMATOLOGY ONCOLOGY | Facility: CLINIC | Age: 33
End: 2023-11-16

## 2023-11-16 NOTE — PROGRESS NOTES
PT called to discuss elvia care application. PT did not provide 1040, bank statements, or household income proof. PT now understands what to submit. I provided PT [facility] number for further clarification. PT has my contact information if questions arise. Wendy Harmon MPH  AGRFB:[phone]  Email: [email or web address]

## 2023-11-20 ENCOUNTER — HOSPITAL ENCOUNTER (OUTPATIENT)
Dept: INTERVENTIONAL RADIOLOGY/VASCULAR | Facility: HOSPITAL | Age: 33
Discharge: HOME | End: 2023-11-20
Admitting: RADIOLOGY

## 2023-11-20 VITALS
SYSTOLIC BLOOD PRESSURE: 141 MMHG | HEART RATE: 96 BPM | TEMPERATURE: 97.5 F | RESPIRATION RATE: 20 BRPM | DIASTOLIC BLOOD PRESSURE: 85 MMHG | OXYGEN SATURATION: 97 %

## 2023-11-20 DIAGNOSIS — C56.3 MALIGNANT NEOPLASM OF BOTH OVARIES (HCC): ICD-10-CM

## 2023-11-20 PROCEDURE — 36590 REMOVAL TUNNELED CV CATH: CPT

## 2023-11-20 RX ORDER — FENTANYL CITRATE 50 UG/ML
INJECTION, SOLUTION INTRAMUSCULAR; INTRAVENOUS AS NEEDED
Status: COMPLETED | OUTPATIENT
Start: 2023-11-20 | End: 2023-11-20

## 2023-11-20 RX ORDER — LIDOCAINE WITH 8.4% SOD BICARB 0.9%(10ML)
SYRINGE (ML) INJECTION AS NEEDED
Status: COMPLETED | OUTPATIENT
Start: 2023-11-20 | End: 2023-11-20

## 2023-11-20 RX ORDER — MIDAZOLAM HYDROCHLORIDE 2 MG/2ML
INJECTION, SOLUTION INTRAMUSCULAR; INTRAVENOUS AS NEEDED
Status: COMPLETED | OUTPATIENT
Start: 2023-11-20 | End: 2023-11-20

## 2023-11-20 RX ADMIN — MIDAZOLAM HYDROCHLORIDE 1 MG: 1 INJECTION, SOLUTION INTRAMUSCULAR; INTRAVENOUS at 09:54

## 2023-11-20 RX ADMIN — FENTANYL CITRATE 50 MCG: 50 INJECTION, SOLUTION INTRAMUSCULAR; INTRAVENOUS at 09:54

## 2023-11-20 RX ADMIN — Medication 10 ML: at 09:58

## 2023-11-20 RX ADMIN — FENTANYL CITRATE 50 MCG: 50 INJECTION, SOLUTION INTRAMUSCULAR; INTRAVENOUS at 10:00

## 2023-11-20 NOTE — BRIEF OP NOTE (RAD/CATH)
INTERVENTIONAL RADIOLOGY PROCEDURE NOTE    Date: 11/20/2023    Procedure: Mustafa Mono removal  Procedure Summary       Date: 11/20/23 Room / Location: [facility] Interventional Radiology    Anesthesia Start:  Anesthesia Stop:     Procedure: IR PORT REMOVAL Diagnosis:       Malignant neoplasm of both ovaries ([address])      (Completed chemotherapy)    Scheduled Providers:  Responsible Provider:     Anesthesia Type: Not recorded ASA Status: Not recorded            Preoperative diagnosis:   1. Malignant neoplasm of both ovaries ([address])         Postoperative diagnosis: Same. Surgeon: Pravin Urbano MD     Assistant: None. No qualified resident was available. Blood loss: Minimal    Specimens: None     Findings: Right chest port removed and pocket closed with sutures glue to skin. Complications: None immediate.     Anesthesia: conscious sedation

## 2023-11-20 NOTE — DISCHARGE INSTRUCTIONS
Implanted Venous Access Port Removal    WHAT YOU NEED TO KNOW:   An implanted venous access port is a device used to give treatments and take blood. It may also be called a central venous access device (CVAD). The port is a small container that is placed under your skin, usually in your upper chest. A port can also be placed in your arm or abdomen. The port is attached to a catheter that enters a large vein. DISCHARGE INSTRUCTIONS:   Resume your normal diet. Small sips of flat soda will help with mild nausea. Prevent an infection:     Wash your hands often. Use soap and water. Clean your hands before and  after you care for your incision. Check your skin for infection every day. Look for redness, swelling, or fluid oozing from the incision site. Dressing may come off in 24 hours. Medical glue will peel off on its own in 5 to 10 days. You may shower 24 hours after procedure. Follow up with your healthcare provider as directed  Write down your questions so you remember to ask them during your visits. Activity:  You may return to your daily activities when the area heals. Contact Interventional Radiology at [phone] Angélica PATIENTS: Contact Interventional Radiology at [phone]) [place] PATIENTS: Contact Interventional Radiology at [phone]) if:     You have a fever. You have persistent nausea. Your inciscion site is red, swollen, or draining pus. You have questions or concerns about your condition or care. Seek care immediately or call 911 if:  Blood soaks through your bandage. The skin over or around your incision breaks open. Your heart is jumping or fluttering. You have a headache, blurred vision, and feel confused. You have pain in your arm, neck, shoulder, or chest.    You have trouble breathing that is getting worse over time.

## 2023-11-20 NOTE — INTERVAL H&P NOTE
Update: (This section must be completed if the H&P was completed greater than 24 hrs to procedure or admission)    H&P reviewed. After examining the patient, I find no changed to the H&P since it had been written. Patient re-evaluated.  Accept as history and physical.    Yobany Bess MD/November 20, 2023/10:21 AM

## 2023-11-24 ENCOUNTER — PATIENT OUTREACH (OUTPATIENT)
Dept: CASE MANAGEMENT | Facility: HOSPITAL | Age: 33
End: 2023-11-24

## 2023-11-24 NOTE — PROGRESS NOTES
LSW reviewed pt's chart. She has no further OSW needs. LSW will close this case at this time. Patient does have my name, phone number and email if any concerns should arise.

## 2023-11-28 ENCOUNTER — HOSPITAL ENCOUNTER (OUTPATIENT)
Dept: RADIOLOGY | Facility: CLINIC | Age: 33
Discharge: HOME | End: 2023-11-28
Payer: COMMERCIAL

## 2023-11-28 DIAGNOSIS — R91.8 ABNORMAL FINDINGS ON DIAGNOSTIC IMAGING OF LUNG: ICD-10-CM

## 2023-11-28 PROCEDURE — 71046 X-RAY EXAM CHEST 2 VIEWS: CPT

## 2023-11-30 ENCOUNTER — PATIENT OUTREACH (OUTPATIENT)
Dept: HEMATOLOGY ONCOLOGY | Facility: CLINIC | Age: 33
End: 2023-11-30

## 2023-11-30 ENCOUNTER — TELEPHONE (OUTPATIENT)
Dept: PULMONOLOGY | Facility: CLINIC | Age: 33
End: 2023-11-30

## 2023-11-30 NOTE — TELEPHONE ENCOUNTER
I called the patient to discuss the results of her chest x-ray and her symptoms, I believe we need to schedule bronchoscopy and PFTs soon and I wanted to discuss that with her.   I left her a brief message that I will call back later today

## 2023-11-30 NOTE — PROGRESS NOTES
Returned PT VM inquiring about elvia care. Informed the PT of all missing documents and the process to finalize application. PT understood. Advised PT on COBRA and insurance. PT has my contact information if need be! Antoni Davis MPH  MFZPI:[phone]  Email: [email or web address]

## 2023-12-01 ENCOUNTER — TELEPHONE (OUTPATIENT)
Dept: PULMONOLOGY | Facility: CLINIC | Age: 33
End: 2023-12-01

## 2023-12-01 ENCOUNTER — PREP FOR PROCEDURE (OUTPATIENT)
Dept: PULMONOLOGY | Facility: CLINIC | Age: 33
End: 2023-12-01

## 2023-12-01 DIAGNOSIS — J84.89 INTERSTITIAL PNEUMONITIS (HCC): Primary | ICD-10-CM

## 2023-12-01 NOTE — TELEPHONE ENCOUNTER
Dr. Harsh Alvarado will be performing a Bronch on Samuel Dunlap on 12/06/2023     LOCATION: [facility]    ANTICOAGULATION INSTRUCTIONS: NONE    OTHER MEDICATION INSTRUCTIONS: NONE    LABS: (CBC/PT/PTT in last 90 days): CBC 12/01/2023 No Recent PT/PTT  (If no, labs ordered / to be done at least one day prior to procedure)    LAST OFFICE NOTE/H&P within 30 days of procedure: 11/13/2023    INSTRUCTIONS GIVEN: Patient called advised on date and location. Pt aware of NPO. Pt aware she will be called the day prior with time of the procedure. Pt aware she will need a ride home.      Minus Lovingston

## 2023-12-01 NOTE — TELEPHONE ENCOUNTER
----- Message from Anais Browning sent at 11/30/2023  9:53 AM EST -----  Regarding: FW: Chest x-ray  Contact: [phone]    ----- Message -----  From: Zechariah Bauer  Sent: 11/30/2023   9:37 AM EST  To: Pulmonary [facility]  Subject: Chest x-ray                                      I would say no fever or chills. Sometimes I feel maybe I’m improving… but overall… real if I am.  I am still very short of breath. Coughing and wheezy sometimes. I don’t really cough anything up. The breathing thing you gave me… I still can hardly make it past 2000. I was skating at practice the other day… trying to do a little more… but had to stop and catch my breath for quite a while… and was coughing and having trouble even catching my breath because I couldn’t hold a breath in.  It’s pretty frustrating. Are we worried about permanent lung damage?   Or what things could possibly help this heal?     (I wasn’t gonna lay for cobra for [facility] because I have nothing scheduled… which means I’d have no insurance)

## 2023-12-01 NOTE — TELEPHONE ENCOUNTER
I spoke to [facility] again, she still has the same shortness of breath as when I saw her in the office, at that time she felt better than before but she still has some limitation with activities. Denies fever or chills or night sweats, denies chest pain, denies cough or sputum production. I had long discussion with her, ideally we should do bronchoscopy to obtain BAL to rule out infection and evaluate the cell count. And since her infiltrates are not improving I believe we should put her on a course of steroids and then follow-up. Also I will obtain PFTs. She verbalized understanding. She will let me know about her insurance issue. Also I might order CRP and CBC.

## 2023-12-04 RX ORDER — MEPERIDINE HYDROCHLORIDE 25 MG/ML
12.5 INJECTION INTRAMUSCULAR; INTRAVENOUS; SUBCUTANEOUS
Status: CANCELLED | OUTPATIENT
Start: 2023-12-04

## 2023-12-04 RX ORDER — FENTANYL CITRATE/PF 50 MCG/ML
25 SYRINGE (ML) INJECTION
Status: CANCELLED | OUTPATIENT
Start: 2023-12-04

## 2023-12-04 RX ORDER — SODIUM CHLORIDE 9 MG/ML
125 INJECTION, SOLUTION INTRAVENOUS CONTINUOUS
Status: CANCELLED | OUTPATIENT
Start: 2023-12-04

## 2023-12-04 RX ORDER — ONDANSETRON 2 MG/ML
4 INJECTION INTRAMUSCULAR; INTRAVENOUS ONCE AS NEEDED
Status: CANCELLED | OUTPATIENT
Start: 2023-12-04

## 2023-12-06 ENCOUNTER — HOSPITAL ENCOUNTER (OUTPATIENT)
Dept: GASTROENTEROLOGY | Facility: HOSPITAL | Age: 33
Setting detail: OUTPATIENT SURGERY
Discharge: HOME | End: 2023-12-06
Attending: INTERNAL MEDICINE
Payer: COMMERCIAL

## 2023-12-06 ENCOUNTER — ANESTHESIA (OUTPATIENT)
Dept: GASTROENTEROLOGY | Facility: HOSPITAL | Age: 33
End: 2023-12-06

## 2023-12-06 ENCOUNTER — ANESTHESIA EVENT (OUTPATIENT)
Dept: GASTROENTEROLOGY | Facility: HOSPITAL | Age: 33
End: 2023-12-06

## 2023-12-06 VITALS
DIASTOLIC BLOOD PRESSURE: 83 MMHG | HEIGHT: 67 IN | RESPIRATION RATE: 16 BRPM | BODY MASS INDEX: 28.88 KG/M2 | HEART RATE: 103 BPM | OXYGEN SATURATION: 98 % | TEMPERATURE: 98.1 F | WEIGHT: 184 LBS | SYSTOLIC BLOOD PRESSURE: 110 MMHG

## 2023-12-06 DIAGNOSIS — J84.89 INTERSTITIAL PNEUMONITIS (HCC): ICD-10-CM

## 2023-12-06 PROBLEM — R06.02 SHORTNESS OF BREATH: Status: ACTIVE | Noted: 2023-12-06

## 2023-12-06 LAB
LYMPHOCYTES NFR BLD AUTO: 34 %
LYMPHOCYTES NFR BLD AUTO: 45 %
MACROPHAGES NFR FLD: 55 %
MACROPHAGES NFR FLD: 66 %
TOTAL CELLS COUNTED SPEC: 100
TOTAL CELLS COUNTED SPEC: 100

## 2023-12-06 PROCEDURE — 89051 BODY FLUID CELL COUNT: CPT | Performed by: INTERNAL MEDICINE

## 2023-12-06 PROCEDURE — 88112 CYTOPATH CELL ENHANCE TECH: CPT | Performed by: STUDENT IN AN ORGANIZED HEALTH CARE EDUCATION/TRAINING PROGRAM

## 2023-12-06 PROCEDURE — 87281 PNEUMOCYSTIS CARINII AG IF: CPT | Performed by: INTERNAL MEDICINE

## 2023-12-06 PROCEDURE — 87205 SMEAR GRAM STAIN: CPT | Performed by: INTERNAL MEDICINE

## 2023-12-06 PROCEDURE — 88185 FLOWCYTOMETRY/TC ADD-ON: CPT | Performed by: INTERNAL MEDICINE

## 2023-12-06 PROCEDURE — 87102 FUNGUS ISOLATION CULTURE: CPT | Performed by: INTERNAL MEDICINE

## 2023-12-06 PROCEDURE — 87206 SMEAR FLUORESCENT/ACID STAI: CPT | Performed by: INTERNAL MEDICINE

## 2023-12-06 PROCEDURE — 87070 CULTURE OTHR SPECIMN AEROBIC: CPT | Performed by: INTERNAL MEDICINE

## 2023-12-06 PROCEDURE — 87015 SPECIMEN INFECT AGNT CONCNTJ: CPT | Performed by: INTERNAL MEDICINE

## 2023-12-06 PROCEDURE — 87252 VIRUS INOCULATION TISSUE: CPT | Performed by: INTERNAL MEDICINE

## 2023-12-06 PROCEDURE — 31624 DX BRONCHOSCOPE/LAVAGE: CPT | Performed by: INTERNAL MEDICINE

## 2023-12-06 PROCEDURE — 87118 MYCOBACTERIC IDENTIFICATION: CPT | Performed by: INTERNAL MEDICINE

## 2023-12-06 PROCEDURE — 87116 MYCOBACTERIA CULTURE: CPT | Performed by: INTERNAL MEDICINE

## 2023-12-06 PROCEDURE — 88184 FLOWCYTOMETRY/ TC 1 MARKER: CPT | Performed by: INTERNAL MEDICINE

## 2023-12-06 RX ORDER — MEPERIDINE HYDROCHLORIDE 25 MG/ML
12.5 INJECTION INTRAMUSCULAR; INTRAVENOUS; SUBCUTANEOUS
Status: DISCONTINUED | OUTPATIENT
Start: 2023-12-06 | End: 2023-12-10 | Stop reason: HOSPADM

## 2023-12-06 RX ORDER — ONDANSETRON 2 MG/ML
4 INJECTION INTRAMUSCULAR; INTRAVENOUS ONCE AS NEEDED
Status: DISCONTINUED | OUTPATIENT
Start: 2023-12-06 | End: 2023-12-10 | Stop reason: HOSPADM

## 2023-12-06 RX ORDER — PROPOFOL 10 MG/ML
INJECTION, EMULSION INTRAVENOUS CONTINUOUS PRN
Status: DISCONTINUED | OUTPATIENT
Start: 2023-12-06 | End: 2023-12-06

## 2023-12-06 RX ORDER — LIDOCAINE HYDROCHLORIDE 10 MG/ML
INJECTION, SOLUTION EPIDURAL; INFILTRATION; INTRACAUDAL; PERINEURAL AS NEEDED
Status: DISCONTINUED | OUTPATIENT
Start: 2023-12-06 | End: 2023-12-06

## 2023-12-06 RX ORDER — ONDANSETRON 2 MG/ML
INJECTION INTRAMUSCULAR; INTRAVENOUS AS NEEDED
Status: DISCONTINUED | OUTPATIENT
Start: 2023-12-06 | End: 2023-12-06

## 2023-12-06 RX ORDER — SCOLOPAMINE TRANSDERMAL SYSTEM 1 MG/1
1 PATCH, EXTENDED RELEASE TRANSDERMAL
Status: DISCONTINUED | OUTPATIENT
Start: 2023-12-06 | End: 2023-12-10 | Stop reason: HOSPADM

## 2023-12-06 RX ORDER — FENTANYL CITRATE 50 UG/ML
INJECTION, SOLUTION INTRAMUSCULAR; INTRAVENOUS AS NEEDED
Status: DISCONTINUED | OUTPATIENT
Start: 2023-12-06 | End: 2023-12-06

## 2023-12-06 RX ORDER — FENTANYL CITRATE/PF 50 MCG/ML
25 SYRINGE (ML) INJECTION
Status: DISCONTINUED | OUTPATIENT
Start: 2023-12-06 | End: 2023-12-10 | Stop reason: HOSPADM

## 2023-12-06 RX ORDER — SODIUM CHLORIDE 9 MG/ML
125 INJECTION, SOLUTION INTRAVENOUS CONTINUOUS
Status: DISCONTINUED | OUTPATIENT
Start: 2023-12-06 | End: 2023-12-10 | Stop reason: HOSPADM

## 2023-12-06 RX ORDER — PROPOFOL 10 MG/ML
INJECTION, EMULSION INTRAVENOUS AS NEEDED
Status: DISCONTINUED | OUTPATIENT
Start: 2023-12-06 | End: 2023-12-06

## 2023-12-06 RX ORDER — MIDAZOLAM HYDROCHLORIDE 2 MG/2ML
INJECTION, SOLUTION INTRAMUSCULAR; INTRAVENOUS AS NEEDED
Status: DISCONTINUED | OUTPATIENT
Start: 2023-12-06 | End: 2023-12-06

## 2023-12-06 RX ORDER — DEXAMETHASONE SODIUM PHOSPHATE 10 MG/ML
INJECTION, SOLUTION INTRAMUSCULAR; INTRAVENOUS AS NEEDED
Status: DISCONTINUED | OUTPATIENT
Start: 2023-12-06 | End: 2023-12-06

## 2023-12-06 RX ADMIN — LIDOCAINE HYDROCHLORIDE 100 MG: 10 INJECTION, SOLUTION EPIDURAL; INFILTRATION; INTRACAUDAL; PERINEURAL at 11:24

## 2023-12-06 RX ADMIN — DEXAMETHASONE SODIUM PHOSPHATE 10 MG: 10 INJECTION INTRAMUSCULAR; INTRAVENOUS at 11:31

## 2023-12-06 RX ADMIN — PROPOFOL 100 MG: 10 INJECTION, EMULSION INTRAVENOUS at 11:25

## 2023-12-06 RX ADMIN — SODIUM CHLORIDE: 0.9 INJECTION, SOLUTION INTRAVENOUS at 11:32

## 2023-12-06 RX ADMIN — SCOPALAMINE 1 PATCH: 1 PATCH, EXTENDED RELEASE TRANSDERMAL at 10:41

## 2023-12-06 RX ADMIN — PROPOFOL 50 MG: 10 INJECTION, EMULSION INTRAVENOUS at 11:26

## 2023-12-06 RX ADMIN — PROPOFOL 100 MG: 10 INJECTION, EMULSION INTRAVENOUS at 11:33

## 2023-12-06 RX ADMIN — MIDAZOLAM 2 MG: 1 INJECTION INTRAMUSCULAR; INTRAVENOUS at 11:20

## 2023-12-06 RX ADMIN — SODIUM CHLORIDE 125 ML/HR: 0.9 INJECTION, SOLUTION INTRAVENOUS at 10:42

## 2023-12-06 RX ADMIN — ONDANSETRON 4 MG: 2 INJECTION INTRAMUSCULAR; INTRAVENOUS at 11:31

## 2023-12-06 RX ADMIN — PROPOFOL 50 MG: 10 INJECTION, EMULSION INTRAVENOUS at 11:36

## 2023-12-06 RX ADMIN — PROPOFOL 120 MCG/KG/MIN: 10 INJECTION, EMULSION INTRAVENOUS at 11:29

## 2023-12-06 RX ADMIN — FENTANYL CITRATE 100 MCG: 50 INJECTION INTRAMUSCULAR; INTRAVENOUS at 11:30

## 2023-12-06 RX ADMIN — PROPOFOL 200 MG: 10 INJECTION, EMULSION INTRAVENOUS at 11:24

## 2023-12-06 NOTE — ANESTHESIA PREPROCEDURE EVALUATION
Procedure:  BRONCHOSCOPY    Relevant Problems   ANESTHESIA   (+) PONV (postoperative nausea and vomiting)      HEMATOLOGY   (+) Anemia due to antineoplastic chemotherapy      PULMONARY   (+) Shortness of breath      Other   (+) Status post total abdominal hysterectomy and bilateral salpingo-oophorectomy (OCHOA-BSO)        Physical Exam    Airway       Dental       Cardiovascular      Pulmonary      Other Findings  post-pubertal.      Anesthesia Plan  ASA Score- 2     Anesthesia Type- general with ASA Monitors. Additional Monitors:     Airway Plan: LMA. Comment: Scop patch in addition to IV antiemetics for PONV. Plan Factors-Exercise tolerance (METS): >4 METS. Chart reviewed. Patient summary reviewed. Patient is a current smoker (Occasional Marijuana vaping, not within last month). Patient did not smoke on day of surgery. Induction- intravenous. Postoperative Plan-     Informed Consent- Anesthetic plan and risks discussed with patient.

## 2023-12-06 NOTE — PERIOPERATIVE NURSING NOTE
BAL left upper 60 in 15 out, 60 in 15 out  Right upper 60 in 12 out, 60 in 15 out, 60 in 15 out  Right lower 60 in 15 out, 60 in 10 out   Left lower 60 in 10 out, 60 in 10 out

## 2023-12-06 NOTE — ANESTHESIA POSTPROCEDURE EVALUATION
Post-Op Assessment Note    CV Status:  Stable    Pain management: adequate       Mental Status:  Alert and awake   Hydration Status:  Euvolemic   PONV Controlled:  Controlled   Airway Patency:  Patent     Post Op Vitals Reviewed: Yes      Staff: Anesthesiologist               BP      Temp      Pulse     Resp      SpO2      /83   Pulse 103   Temp 98.1 °F (36.7 °C) (Temporal)   Resp 16   Ht 5' 7" (1.702 m)   Wt 83.5 kg (184 lb)   LMP 05/15/2023 (Exact Date)   SpO2 98%   BMI 28.82 kg/m²

## 2023-12-06 NOTE — CASE MANAGEMENT
Case Management ED Discharge Planning Note    Patient name Riky Vidal  Location Room/bed info not found MRN [id number]  : 1990 Date 2023        OBJECTIVE:  Predictive Model Details         63% Factor Value    Risk of Hospital Admission or ED Visit Model Number of ED Visits 1     Number of Hospitalizations 3     Is in Relationship No     Has Anemia Yes            Chief Complaint: . Patient Class: Outpatient Surgery  Preferred Pharmacy:   [facility] [address] [facility], [address] [place] [address]  Phone: [phone] Fax: [address] [facility] [address],  [address],  [facility] [place] [address]  Phone: [phone] Fax: [phone]    Primary Care Provider: No primary care provider on file. Primary Insurance: [id number] [employer]  Secondary Insurance:     ED Discharge Details: Additional Comments: CM was consulted dt pt endorsing financial issues to medical team during GI prodecure. Pt states she is on short term disability, pending long term disability, lost her job, and her COBRA insurance will be inactive come January. Additionally, she submitted a [employer] nishant for financial hardship as she is concerned about how she will pay for her medical expenses. CM provided active listening as well as a referral to [facility] PCP and [facility]. Pt is agreeable, denying any concerns or question at this time. Pt strongly encouraged to schedule PCP apt.     Follow-up Appointment Information  Follow up appointment scheduled with[de-identified] [facility]  Follow up appointment scheduled with the following [facility] services[de-identified] PCP

## 2023-12-07 LAB
RHODAMINE-AURAMINE STN SPEC: NORMAL
SCAN RESULT: NORMAL

## 2023-12-08 ENCOUNTER — TELEPHONE (OUTPATIENT)
Dept: ADMINISTRATIVE | Facility: OTHER | Age: 33
End: 2023-12-08

## 2023-12-08 LAB
BACTERIA BRONCH AEROBE CULT: NO GROWTH
GRAM STN SPEC: NORMAL
P JIROVECII AG SPEC QL IF: NORMAL

## 2023-12-08 NOTE — TELEPHONE ENCOUNTER
----- Message from Safia Ramirez sent at 12/8/2023 11:01 AM EST -----  Regarding: quality metric update  12/08/23 11:01 AM    Hello, our patient above has had Hepatitis C and HIV completed/performed. Please assist in updating the patient chart by pulling the Care Everywhere (CE) document. The date of service is 8/31/10.      Thank you,  Safia Ramirez  [facility] PRIMARY [facility]

## 2023-12-11 ENCOUNTER — OFFICE VISIT (OUTPATIENT)
Dept: FAMILY MEDICINE CLINIC | Facility: CLINIC | Age: 33
End: 2023-12-11
Payer: COMMERCIAL

## 2023-12-11 VITALS
OXYGEN SATURATION: 97 % | BODY MASS INDEX: 28.83 KG/M2 | SYSTOLIC BLOOD PRESSURE: 131 MMHG | TEMPERATURE: 97.8 F | HEART RATE: 96 BPM | WEIGHT: 184.08 LBS | RESPIRATION RATE: 14 BRPM | DIASTOLIC BLOOD PRESSURE: 78 MMHG

## 2023-12-11 DIAGNOSIS — Z78.9 NEED FOR FOLLOW-UP BY SOCIAL WORKER: Primary | ICD-10-CM

## 2023-12-11 DIAGNOSIS — Z11.4 SCREENING FOR HIV (HUMAN IMMUNODEFICIENCY VIRUS): ICD-10-CM

## 2023-12-11 DIAGNOSIS — Z13.6 SCREENING FOR CARDIOVASCULAR CONDITION: ICD-10-CM

## 2023-12-11 DIAGNOSIS — Z00.00 ANNUAL PHYSICAL EXAM: Primary | ICD-10-CM

## 2023-12-11 DIAGNOSIS — Z11.59 NEED FOR HEPATITIS C SCREENING TEST: ICD-10-CM

## 2023-12-11 LAB — FUNGUS SPEC CULT: NORMAL

## 2023-12-11 PROCEDURE — 99385 PREV VISIT NEW AGE 18-39: CPT | Performed by: FAMILY MEDICINE

## 2023-12-11 NOTE — PROGRESS NOTES
ADULT ANNUAL [address] [facility] PRIMARY CARE    NAME: Hero Calderon  AGE: 27 y.o. SEX: female  : 1990     DATE: 2023     Assessment and Plan:     Problem List Items Addressed This Visit    None  Visit Diagnoses       Annual physical exam    -  Primary    BMI 28.0-28.9,adult        Screening for cardiovascular condition        Relevant Orders    Lipid panel    Need for hepatitis C screening test        Relevant Orders    Hepatitis C Antibody    Screening for HIV (human immunodeficiency virus)        Relevant Orders    HIV 1/2 AG/AB w Reflex [facility] for 2 yr old and above            Immunizations and preventive care screenings were discussed with patient today. Appropriate education was printed on patient's after visit summary. BMI Counseling: Body mass index is 28.83 kg/m². The BMI is above normal. Nutrition recommendations include encouraging healthy choices of fruits and vegetables. Exercise recommendations include moderate physical activity 150 minutes/week and exercising 3-5 times per week. Rationale for BMI follow-up plan is due to patient being overweight or obese. Depression Screening and Follow-up Plan: Patient was screened for depression during today's encounter. They screened negative with a PHQ-2 score of 0. Return in about 1 year (around 2024). Chief Complaint:     Chief Complaint   Patient presents with    Establish Care      History of Present Illness:     Adult Annual Physical   Patient here for a comprehensive physical exam and establish care.      PMH: Ovarian cancer s/p TAHBSO and chemotherapy now in remission,   SurgHx: TAHBSO, Right leg surgery follow fracture, wisdom teeth, bronchoscopy  Allergies: penicillins  Medications: MV, Vit D   FamHx: Mother - ovarian cysts, PGM - pancreatic cancer, Father -  by suicide,   SocialHx:   Tobacco: no tobacco use, does smoke marijuana - has cut back since chemo    Alcohol: 3 per week on average    Relationship: In a relationship and no children    Career: not currently working   Hobbies: roller derby    Diet and Physical Activity  Diet/Nutrition: well balanced diet. Exercise:  patient does roller derby and roller blading  . Depression Screening  PHQ-2/9 Depression Screening    Little interest or pleasure in doing things: 0 - not at all  Feeling down, depressed, or hopeless: 0 - not at all  PHQ-2 Score: 0  PHQ-2 Interpretation: Negative depression screen       General Health  Sleep: sleeps well. Hearing: normal - bilateral.  Vision: no vision problems. Dental: regular dental visits. /GYN Health  Follows with gynecology? Yes- history of ovarian cancer. Review of Systems:     Review of Systems   Constitutional:  Negative for activity change, appetite change, chills, diaphoresis, fever and unexpected weight change. HENT:  Negative for congestion, rhinorrhea, sore throat and trouble swallowing. Eyes:  Negative for visual disturbance. Respiratory:  Negative for cough, shortness of breath and wheezing. Cardiovascular:  Negative for chest pain, palpitations and leg swelling. Gastrointestinal:  Negative for abdominal pain, blood in stool, constipation and diarrhea. Genitourinary:  Negative for difficulty urinating, dysuria, frequency and hematuria. Musculoskeletal:  Negative for arthralgias, back pain and joint swelling. Skin:  Negative for color change and rash. Neurological:  Negative for dizziness, light-headedness and headaches. Psychiatric/Behavioral:  Negative for dysphoric mood. The patient is not nervous/anxious.        Past Medical History:     Past Medical History:   Diagnosis Date    Cancer ([address])     Ovarian    No known health problems     PONV (postoperative nausea and vomiting)       Past Surgical History:     Past Surgical History:   Procedure Laterality Date    HYSTERECTOMY N/A 6/13/2023    Procedure: OCHOA/BSO, STAGING radical omentectomy bilateral pelvic lymph node dissection;  Surgeon: Carter Blake MD;  Location: [facility];  Service: Gynecology Oncology    IR PORT PLACEMENT  7/24/2023    IR PORT REMOVAL  11/20/2023    ORIF TIBIA & FIBULA FRACTURES Right     [place] EXPLORATORY LAPAROTOMY CELIOTOMY W/WO BIOPSY SPX N/A 6/13/2023    Procedure: EX LAP;  Surgeon: Carter Blake MD;  [facility];  Service: Gynecology Oncology    URETERAL STENT PLACEMENT Bilateral 6/13/2023    Procedure: Krystal Rivas; STENT URETERAL;  Surgeon: Nara Croft MD;  Location: [facility];  Service: Urology    WISDOM TOOTH EXTRACTION        Social History:     Social History     Socioeconomic History    Marital status:      Spouse name: None    Number of children: None    Years of education: None    Highest education level: None   Occupational History    None   Tobacco Use    Smoking status: Never    Smokeless tobacco: Never   Vaping Use    Vaping Use: Never used   Substance and Sexual Activity    Alcohol use:  Yes     Alcohol/week: 5.0 standard drinks of alcohol     Types: 5 Standard drinks or equivalent per week     Comment: Occasional    Drug use: Yes     Types: Marijuana     Comment: daily vape last used 6/12    Sexual activity: Yes     Partners: Male     Birth control/protection: None   Other Topics Concern    None   Social History Narrative    None     Social Determinants of Health     Financial Resource Strain: Not on file   Food Insecurity: Not on file   Transportation Needs: Not on file   Physical Activity: Not on file   Stress: Not on file   Social Connections: Not on file   Intimate Partner Violence: Not on file   Housing Stability: Not on file      Family History:     Family History   Problem Relation Age of Onset    Ovarian cysts Mother     No Known Problems Father       Current Medications:     Current Outpatient Medications   Medication Sig Dispense Refill    cholecalciferol (VITAMIN D3) 1,000 units tablet Take 5,000 Units by mouth daily      CRANIAL PROSTHESIS, RX, Apply for drug-induced alopecia 1 each 0    Multiple Vitamin (multivitamin) tablet Take 1 tablet by mouth daily       No current facility-administered medications for this visit. Allergies: Allergies   Allergen Reactions    Penicillins Itching and Rash     Rash  Rash        Physical Exam:     /78 (BP Location: Left arm, Patient Position: Sitting)   Pulse 96   Temp 97.8 °F (36.6 °C) (Tympanic)   Resp 14   Wt 83.5 kg (184 lb 1.4 oz)   LMP 05/15/2023 (Exact Date)   SpO2 97%   BMI 28.83 kg/m²     Physical Exam  Vitals reviewed. Constitutional:       Appearance: Normal appearance. She is normal weight. HENT:      Head: Normocephalic and atraumatic. Right Ear: Tympanic membrane, ear canal and external ear normal. There is no impacted cerumen. Left Ear: Tympanic membrane, ear canal and external ear normal. There is no impacted cerumen. Nose: Nose normal. No congestion. Mouth/Throat:      Mouth: Mucous membranes are moist.      Pharynx: Oropharynx is clear. No oropharyngeal exudate. Eyes:      Extraocular Movements: Extraocular movements intact. Conjunctiva/sclera: Conjunctivae normal.   Neck:      Thyroid: No thyroid mass or thyromegaly. Cardiovascular:      Rate and Rhythm: Normal rate and regular rhythm. Pulses: Normal pulses. Heart sounds: Normal heart sounds. No murmur heard. Pulmonary:      Effort: Pulmonary effort is normal. No respiratory distress. Breath sounds: Normal breath sounds. No wheezing. Abdominal:      General: Abdomen is flat. Bowel sounds are normal. There is no distension. Palpations: Abdomen is soft. Tenderness: There is no abdominal tenderness. Musculoskeletal:      Cervical back: Normal range of motion and neck supple. Right lower leg: No edema. Left lower leg: No edema. Skin:     General: Skin is warm and dry.    Neurological:      General: No focal deficit present. Mental Status: She is alert. Sensory: No sensory deficit. Motor: No weakness.       Gait: Gait normal.   Psychiatric:         Mood and Affect: Mood normal.         Behavior: Behavior normal.          Silke Millan, [address]

## 2023-12-12 LAB
MYCOBACTERIUM SPEC CULT: NORMAL
RHODAMINE-AURAMINE STN SPEC: NORMAL

## 2023-12-14 ENCOUNTER — DOCUMENTATION (OUTPATIENT)
Dept: HEMATOLOGY ONCOLOGY | Facility: CLINIC | Age: 33
End: 2023-12-14

## 2023-12-14 ENCOUNTER — TELEPHONE (OUTPATIENT)
Dept: PULMONOLOGY | Facility: CLINIC | Age: 33
End: 2023-12-14

## 2023-12-14 DIAGNOSIS — J98.4 DRUG-INDUCED PNEUMONITIS: Primary | ICD-10-CM

## 2023-12-14 DIAGNOSIS — T50.905A DRUG-INDUCED PNEUMONITIS: Primary | ICD-10-CM

## 2023-12-14 LAB — SCAN RESULT: NORMAL

## 2023-12-14 RX ORDER — PREDNISONE 10 MG/1
30 TABLET ORAL DAILY
Qty: 100 TABLET | Refills: 0 | Status: SHIPPED | OUTPATIENT
Start: 2023-12-14

## 2023-12-14 NOTE — PROGRESS NOTES
Remaining documents for processing:   Copy of SIGNED income tax  Complete Zero income worksheet    Pending letter was mailed on 2023 to patient. ENRIQUETA SmithPO:[phone]  Email: [email or web address]

## 2023-12-14 NOTE — TELEPHONE ENCOUNTER
I spoke to the patient about her bronchoscopy results and my intention to start on prednisone course starting 30 mg daily for 2 weeks then 20 mg daily for 2 weeks then 10 mg daily for 2 weeks then 5 mg daily for 2 weeks then she will stop. She will text me about how she feels after 2 weeks and we will continue to communicate then I have an appointment with her end of January.

## 2023-12-15 NOTE — TELEPHONE ENCOUNTER
Upon review of the In Basket request we were able to locate, review, and update the patient chart as requested for Hepatitis C  and HIV. Any additional questions or concerns should be emailed to the Practice Liaisons via the appropriate education email address, please do not reply via In Basket.     Thank you  Nely Pack

## 2023-12-18 LAB
FUNGUS SPEC CULT: NORMAL
MYCOBACTERIUM SPEC CULT: ABNORMAL
MYCOBACTERIUM SPEC CULT: NORMAL
RHODAMINE-AURAMINE STN SPEC: ABNORMAL
RHODAMINE-AURAMINE STN SPEC: NORMAL

## 2023-12-19 LAB
MYCOBACTERIUM SPEC CULT: ABNORMAL
MYCOBACTERIUM SPEC CULT: ABNORMAL
RHODAMINE-AURAMINE STN SPEC: ABNORMAL

## 2023-12-21 ENCOUNTER — TELEPHONE (OUTPATIENT)
Dept: PULMONOLOGY | Facility: CLINIC | Age: 33
End: 2023-12-21

## 2023-12-21 DIAGNOSIS — R93.89 ABNORMAL CHEST CT: Primary | ICD-10-CM

## 2023-12-21 NOTE — TELEPHONE ENCOUNTER
I called Jennifer and updated her about the results of the AFP, I doubt this is TB and there is a possibility of atypical mycobacterial infection.  She feels fine in general, she started prednisone probably a week ago reported possible some improvement in her shortness of breath but not sure.  I explained to her the findings and decided to stop the steroids for now awaiting further results from microbiology.  Patient has no risks for TB, no history of TB exposure, no history of incarceration, no travel history.   Patient does not have cough or sputum production.  And since AFB was seen on the broth I doubt she is contagious or causing any infectious hazard.  I ordered QuantiFERON will test and will follow on chest CT scan plan for 12/22  Will reach out to the health department as well.

## 2023-12-21 NOTE — TELEPHONE ENCOUNTER
Yuliet calling from the dept of health regarding pts bronch. And CXR. Tranferred call to Sindhu Garcia MA to further assist.

## 2023-12-21 NOTE — TELEPHONE ENCOUNTER
Information has been provided to Dr Martínez. Stacie Gordon [phone] from dept of health can be reached to number provided.

## 2023-12-26 LAB — FUNGUS SPEC CULT: NORMAL

## 2023-12-27 ENCOUNTER — APPOINTMENT (OUTPATIENT)
Dept: LAB | Facility: CLINIC | Age: 33
End: 2023-12-27
Payer: COMMERCIAL

## 2023-12-27 DIAGNOSIS — R93.89 ABNORMAL CHEST CT: ICD-10-CM

## 2023-12-27 DIAGNOSIS — J84.89 INTERSTITIAL PNEUMONITIS (HCC): ICD-10-CM

## 2023-12-27 DIAGNOSIS — Z11.4 SCREENING FOR HIV (HUMAN IMMUNODEFICIENCY VIRUS): ICD-10-CM

## 2023-12-27 DIAGNOSIS — Z13.6 SCREENING FOR CARDIOVASCULAR CONDITION: ICD-10-CM

## 2023-12-27 DIAGNOSIS — Z11.59 NEED FOR HEPATITIS C SCREENING TEST: ICD-10-CM

## 2023-12-27 LAB
BASOPHILS # BLD AUTO: 0.04 THOUSANDS/ÂΜL (ref 0–0.1)
BASOPHILS NFR BLD AUTO: 1 % (ref 0–1)
CHOLEST SERPL-MCNC: 261 MG/DL
CRP SERPL QL: 1.7 MG/L
EOSINOPHIL # BLD AUTO: 0.39 THOUSAND/ÂΜL (ref 0–0.61)
EOSINOPHIL NFR BLD AUTO: 8 % (ref 0–6)
ERYTHROCYTE [DISTWIDTH] IN BLOOD BY AUTOMATED COUNT: 12.8 % (ref 11.6–15.1)
HCT VFR BLD AUTO: 42.6 % (ref 34.8–46.1)
HDLC SERPL-MCNC: 41 MG/DL
HGB BLD-MCNC: 13.6 G/DL (ref 11.5–15.4)
HIV 1+2 AB+HIV1 P24 AG SERPL QL IA: NORMAL
HIV 2 AB SERPL QL IA: NORMAL
HIV1 AB SERPL QL IA: NORMAL
HIV1 P24 AG SERPL QL IA: NORMAL
IMM GRANULOCYTES # BLD AUTO: 0 THOUSAND/UL (ref 0–0.2)
IMM GRANULOCYTES NFR BLD AUTO: 0 % (ref 0–2)
LDLC SERPL CALC-MCNC: 152 MG/DL (ref 0–100)
LYMPHOCYTES # BLD AUTO: 1.88 THOUSANDS/ÂΜL (ref 0.6–4.47)
LYMPHOCYTES NFR BLD AUTO: 41 % (ref 14–44)
MCH RBC QN AUTO: 29.6 PG (ref 26.8–34.3)
MCHC RBC AUTO-ENTMCNC: 31.9 G/DL (ref 31.4–37.4)
MCV RBC AUTO: 93 FL (ref 82–98)
MONOCYTES # BLD AUTO: 0.45 THOUSAND/ÂΜL (ref 0.17–1.22)
MONOCYTES NFR BLD AUTO: 10 % (ref 4–12)
NEUTROPHILS # BLD AUTO: 1.86 THOUSANDS/ÂΜL (ref 1.85–7.62)
NEUTS SEG NFR BLD AUTO: 40 % (ref 43–75)
NONHDLC SERPL-MCNC: 220 MG/DL
NRBC BLD AUTO-RTO: 0 /100 WBCS
PLATELET # BLD AUTO: 248 THOUSANDS/UL (ref 149–390)
PMV BLD AUTO: 10 FL (ref 8.9–12.7)
RBC # BLD AUTO: 4.59 MILLION/UL (ref 3.81–5.12)
TRIGL SERPL-MCNC: 338 MG/DL
WBC # BLD AUTO: 4.62 THOUSAND/UL (ref 4.31–10.16)

## 2023-12-27 PROCEDURE — 87389 HIV-1 AG W/HIV-1&-2 AB AG IA: CPT

## 2023-12-27 PROCEDURE — 85025 COMPLETE CBC W/AUTO DIFF WBC: CPT

## 2023-12-27 PROCEDURE — 36415 COLL VENOUS BLD VENIPUNCTURE: CPT

## 2023-12-27 PROCEDURE — 86480 TB TEST CELL IMMUN MEASURE: CPT

## 2023-12-27 PROCEDURE — 86140 C-REACTIVE PROTEIN: CPT

## 2023-12-27 PROCEDURE — 80061 LIPID PANEL: CPT

## 2023-12-27 PROCEDURE — 86803 HEPATITIS C AB TEST: CPT

## 2023-12-28 LAB — HCV AB SER QL: NORMAL

## 2023-12-29 ENCOUNTER — DOCUMENTATION (OUTPATIENT)
Dept: HEMATOLOGY ONCOLOGY | Facility: CLINIC | Age: 33
End: 2023-12-29

## 2023-12-29 LAB
GAMMA INTERFERON BACKGROUND BLD IA-ACNC: 0.04 IU/ML
M TB IFN-G BLD-IMP: NEGATIVE
M TB IFN-G CD4+ BCKGRND COR BLD-ACNC: 0.02 IU/ML
M TB IFN-G CD4+ BCKGRND COR BLD-ACNC: 0.04 IU/ML
MITOGEN IGNF BCKGRD COR BLD-ACNC: 9.96 IU/ML

## 2023-12-29 NOTE — PROGRESS NOTES
PT emailed me regarding elvia care nishant not including [facility] bill in May. Called [facility] to understand more. [facility] indicated that elvia care can be moved to approval date of January 2023- June 2024. PT was notified. PT has [employer] through end of calendar year. PT has not applied for MA yet or secured employment. PT was worried about affording the $1400 bill. PT was grateful for the help. Pt will continue pursuing employment opportunities and has the resources to apply for MA.

## 2024-01-02 LAB — FUNGUS SPEC CULT: NORMAL

## 2024-01-03 LAB
MYCOBACTERIUM SPEC CULT: ABNORMAL
MYCOBACTERIUM SPEC CULT: ABNORMAL
MYCOBACTERIUM SPEC CULT: NORMAL
RHODAMINE-AURAMINE STN SPEC: ABNORMAL
RHODAMINE-AURAMINE STN SPEC: NORMAL

## 2024-01-08 LAB — FUNGUS SPEC CULT: NORMAL

## 2024-01-09 LAB
MYCOBACTERIUM SPEC CULT: ABNORMAL
RHODAMINE-AURAMINE STN SPEC: ABNORMAL
RHODAMINE-AURAMINE STN SPEC: ABNORMAL

## 2024-01-17 ENCOUNTER — HOSPITAL ENCOUNTER (OUTPATIENT)
Dept: PULMONOLOGY | Facility: HOSPITAL | Age: 34
Discharge: HOME | End: 2024-01-17
Attending: INTERNAL MEDICINE

## 2024-01-17 DIAGNOSIS — J84.89 INTERSTITIAL PNEUMONITIS (HCC): ICD-10-CM

## 2024-01-17 PROCEDURE — 94726 PLETHYSMOGRAPHY LUNG VOLUMES: CPT

## 2024-01-17 PROCEDURE — 94761 N-INVAS EAR/PLS OXIMETRY MLT: CPT

## 2024-01-17 PROCEDURE — 94729 DIFFUSING CAPACITY: CPT

## 2024-01-17 PROCEDURE — 94060 EVALUATION OF WHEEZING: CPT

## 2024-01-17 RX ORDER — ALBUTEROL SULFATE 2.5 MG/3ML
2.5 SOLUTION RESPIRATORY (INHALATION) ONCE AS NEEDED
Status: COMPLETED | OUTPATIENT
Start: 2024-01-17 | End: 2024-01-17

## 2024-01-17 RX ADMIN — ALBUTEROL SULFATE 2.5 MG: 2.5 SOLUTION RESPIRATORY (INHALATION) at 16:55

## 2024-01-22 ENCOUNTER — APPOINTMENT (OUTPATIENT)
Dept: LAB | Facility: CLINIC | Age: 34
End: 2024-01-22

## 2024-01-22 ENCOUNTER — HOSPITAL ENCOUNTER (OUTPATIENT)
Dept: CT IMAGING | Facility: HOSPITAL | Age: 34
Discharge: HOME | End: 2024-01-22

## 2024-01-22 DIAGNOSIS — C56.3 MALIGNANT NEOPLASM OF BOTH OVARIES (HCC): ICD-10-CM

## 2024-01-22 LAB — CANCER AG125 SERPL-ACNC: 8.4 U/ML (ref 0–35)

## 2024-01-22 PROCEDURE — 74177 CT ABD & PELVIS W/CONTRAST: CPT

## 2024-01-22 PROCEDURE — 71260 CT THORAX DX C+: CPT

## 2024-01-22 PROCEDURE — G1004 CDSM NDSC: HCPCS

## 2024-01-22 PROCEDURE — 86304 IMMUNOASSAY TUMOR CA 125: CPT

## 2024-01-22 RX ADMIN — IOHEXOL 100 ML: 350 INJECTION, SOLUTION INTRAVENOUS at 08:07

## 2024-01-30 ENCOUNTER — OFFICE VISIT (OUTPATIENT)
Dept: PULMONOLOGY | Facility: CLINIC | Age: 34
End: 2024-01-30
Payer: COMMERCIAL

## 2024-01-30 VITALS
OXYGEN SATURATION: 98 % | HEART RATE: 74 BPM | HEIGHT: 67 IN | DIASTOLIC BLOOD PRESSURE: 90 MMHG | SYSTOLIC BLOOD PRESSURE: 128 MMHG | WEIGHT: 190.4 LBS | BODY MASS INDEX: 29.88 KG/M2

## 2024-01-30 DIAGNOSIS — Z22.39 MYCOBACTERIUM AVIUM INTRACELLULARE COLONIZATION: ICD-10-CM

## 2024-01-30 DIAGNOSIS — J98.4 RESTRICTIVE LUNG DISEASE: ICD-10-CM

## 2024-01-30 DIAGNOSIS — J84.89 INTERSTITIAL PNEUMONITIS (HCC): ICD-10-CM

## 2024-01-30 DIAGNOSIS — C56.9 MALIGNANT NEOPLASM OF OVARY, UNSPECIFIED LATERALITY (HCC): Primary | ICD-10-CM

## 2024-01-30 PROCEDURE — 99214 OFFICE O/P EST MOD 30 MIN: CPT | Performed by: INTERNAL MEDICINE

## 2024-01-30 NOTE — ASSESSMENT & PLAN NOTE
Had some restriction on her PFTs but since she is asymptomatic currently and her CT scan is improving no further intervention but I will repeat PFTs in 1 year hopefully it will improve by that time.

## 2024-01-30 NOTE — ASSESSMENT & PLAN NOTE
Continue to follow with gynecology oncology, I will follow on CT scans ordered by them.  Her last  was low/stable.

## 2024-01-30 NOTE — ASSESSMENT & PLAN NOTE
Most likely chemotherapy-induced pneumonitis but the patient improved clinically and there is significant improvement on chest CT scan, she completed her chemotherapy so no need for further intervention at this time.  I will continue to follow on CT scans ordered by her gynecology oncology.

## 2024-01-30 NOTE — PROGRESS NOTES
Progress note - Pulmonary Medicine   Jennifer Hale 33 y.o. female MRN: [id number]       Impression & Plan:     Interstitial pneumonitis (HCC)  Most likely chemotherapy-induced pneumonitis but the patient improved clinically and there is significant improvement on chest CT scan, she completed her chemotherapy so no need for further intervention at this time.  I will continue to follow on CT scans ordered by her gynecology oncology.    Restrictive lung disease  Had some restriction on her PFTs but since she is asymptomatic currently and her CT scan is improving no further intervention but I will repeat PFTs in 1 year hopefully it will improve by that time.    Mycobacterium avium intracellulare colonization  Incidental growth of MAC on BAL, I doubt this is the cause of the infiltrate since they improved, patient does not have any symptoms of chronic cough or dyspnea, no fever or night sweats or weight loss.  Will continue to monitor on future CT scans.  I had long discussion with the patient about MAC, explained to her is not contagious, explained she might have acquired it.  Most likely she acquired this bacteria during the time she was getting chemotherapy and her immune system was suppressed.  Will continue to monitor clinically for now.    Malignant neoplasm of ovary (HCC)  Continue to follow with gynecology oncology, I will follow on CT scans ordered by them.  Her last  was low/stable.      Return in about 1 year (around 1/30/2025).    Diagnoses and all orders for this visit:    Malignant neoplasm of ovary, unspecified laterality (HCC)    Interstitial pneumonitis (HCC)  -     Complete PFT with post bronchodilator; Future    Mycobacterium avium intracellulare colonization    Restrictive lung disease      ______________________________________________________________________    HPI:    Jennifer Hale presents today for follow-up of abnormal chest CT scan with questionable pneumonitis.    Patient was  diagnosed and treated for stage II ovarian cancer in May 2023, she had total hysterectomy with bilateral oophorectomy followed by chemotherapy with bleomycin, cisplatin, fosaprepitant and etoposide.  After she completed her last chemotherapy in October she had a chest CT scan that showed abnormalities and she came for follow-up.  When I evaluated the patient initially she had very minimal symptoms of dyspnea on exertion but she was still recovering from chemotherapy and her chest CT scan showed what looks like drug-induced pneumonitis due to chemotherapy, since she was done with her treatment and her symptoms were minimal we decided not to pursue further workup or treatment and to continue to monitor.  Then she had a chest x-ray few weeks after that showed more prominent infiltrates relatively and when I spoke to her over the phone she was still having some symptoms and she could not tell whether it is getting worse or not but she was not recovering.  At that time she underwent bronchoscopy and we started corticosteroids after that for a few days then we had AFB positive so I stopped the steroids and we decided to monitor clinically.    Patient presents today feeling much better, she has more energy and almost denies any dyspnea on exertion, she can do her practice more as she does ice-skating and she has been traveling and exercising without limitation.  She denies chronic cough or sputum production, denies chest pain or tightness, denies wheezing.  She denies fever chills or night sweats or weight loss.        Current Medications:    Current Outpatient Medications:     cholecalciferol (VITAMIN D3) 1,000 units tablet, Take 5,000 Units by mouth daily, Disp: , Rfl:     Multiple Vitamin (multivitamin) tablet, Take 1 tablet by mouth daily, Disp: , Rfl:     CRANIAL PROSTHESIS, RX,, Apply for drug-induced alopecia (Patient not taking: Reported on 1/30/2024), Disp: 1 each, Rfl: 0    predniSONE 10 mg tablet, Take 3 tablets  "(30 mg total) by mouth daily For 2 weeks then 2 tablets (20 mg) daily for 2 weeks then 1 tablet / 10 mg daily for 2 weeks, then half tablet (5 mg daily) for 2 weeks then stop. (Patient not taking: Reported on 1/30/2024), Disp: 100 tablet, Rfl: 0    Review of Systems:  Review of Systems   Constitutional: Negative.  Negative for appetite change and fever.   HENT: Negative.  Negative for ear pain, postnasal drip, rhinorrhea, sneezing, sore throat and trouble swallowing.    Eyes: Negative.    Respiratory: Negative.     Cardiovascular: Negative.  Negative for chest pain.   Gastrointestinal: Negative.    Endocrine: Negative.    Genitourinary: Negative.    Musculoskeletal: Negative.  Negative for myalgias.   Skin: Negative.    Allergic/Immunologic: Negative.    Neurological: Negative.  Negative for headaches.   Hematological: Negative.    Psychiatric/Behavioral: Negative.       Aside from what is mentioned in the HPI, the review of systems is otherwise negative    Past medical history, surgical history, and family history were reviewed and updated as appropriate    Social history updates:  Social History     Tobacco Use   Smoking Status Never   Smokeless Tobacco Never       PhysicalExamination:  Vitals:   /90 (BP Location: Left arm, Patient Position: Sitting, Cuff Size: Standard)   Pulse 74   Ht 5' 7\" (1.702 m)   Wt 86.4 kg (190 lb 6.4 oz)   LMP 05/15/2023 (Exact Date)   SpO2 98%   BMI 29.82 kg/m²     Gen:  Comfortable on room air.  No conversational dyspnea  HEENT:  Conjugate gaze.  sclerae anicteric.  Oropharynx moist  Neck: Trachea is midline. No JVD. No adenopathy  Chest: Equal breath sounds and clear to auscultation bilaterally, no crackles or wheezing  Cardiac: 1 S2 regular. no murmur  Abdomen:  benign  Extremities: No edema  Neuro:  Normal speech and mentation    Diagnostic Data:  Labs:  I personally reviewed the most recent laboratory data pertinent to today's visit    Lab Results   Component Value Date "    WBC 5.36 01/22/2024    HGB 13.7 01/22/2024    HCT 42.2 01/22/2024    MCV 90 01/22/2024     01/22/2024     Lab Results   Component Value Date    SODIUM 139 01/22/2024    K 3.8 01/22/2024    CO2 25 01/22/2024     01/22/2024    BUN 12 01/22/2024    CREATININE 0.75 01/22/2024    CALCIUM 9.3 01/22/2024     Microbiology:  Mycobacterium Avium complex from BAL December 2020        PFT results:  The most recent pulmonary function tests were reviewed.  Moderate restrictive airflow imitation on spirometry with no obstruction  No significant bronchodilator response  Decreased total lung capacity indicating mild restriction  Mildly impaired diffusion capacity  Probably normal flow-volume loop but to consider extrathoracic variable obstruction with the abnormal inspiratory limb  6-minute walk test without desaturation    Imaging:  I personally reviewed the images on the PAC system pertinent to today's visit  Chest CT scan reviewed on PACS today with the patient in the room compared to prior CT scan: Significant improvement in bilateral patchy areas of groundglass changes with residual scarring or bands.      Peter Martínez MD  Answers submitted by the patient for this visit:  Pulmonology Questionnaire (Submitted on 1/29/2024)  Chief Complaint: Primary symptoms  When did you first notice your symptoms?: more than 1 month ago  Since you first noticed this problem, how has it changed?: gradually improving  Do you have shortness of breath that occurs with effort or exertion?: Yes  Do you have ear congestion?: No  Do you have heartburn?: No  Do you have fatigue?: No  Do you have nasal congestion?: No  Do you have shortness of breath when lying flat?: No  Do you have shortness of breath when you wake up?: No  Do you have sweats?: No  Have you experienced weight loss?: No  Which of the following makes your symptoms worse?: exercise  Risk factors for lung disease: animal exposure

## 2024-01-30 NOTE — ASSESSMENT & PLAN NOTE
Incidental growth of MAC on BAL, I doubt this is the cause of the infiltrate since they improved, patient does not have any symptoms of chronic cough or dyspnea, no fever or night sweats or weight loss.  Will continue to monitor on future CT scans.  I had long discussion with the patient about MAC, explained to her is not contagious, explained she might have acquired it.  Most likely she acquired this bacteria during the time she was getting chemotherapy and her immune system was suppressed.  Will continue to monitor clinically for now.

## 2024-02-05 ENCOUNTER — OFFICE VISIT (OUTPATIENT)
Dept: GYNECOLOGIC ONCOLOGY | Facility: CLINIC | Age: 34
End: 2024-02-05

## 2024-02-05 VITALS
TEMPERATURE: 98.2 F | SYSTOLIC BLOOD PRESSURE: 134 MMHG | RESPIRATION RATE: 16 BRPM | BODY MASS INDEX: 29.79 KG/M2 | DIASTOLIC BLOOD PRESSURE: 80 MMHG | HEIGHT: 67 IN | WEIGHT: 189.8 LBS | OXYGEN SATURATION: 99 % | HEART RATE: 74 BPM

## 2024-02-05 DIAGNOSIS — C56.9 MALIGNANT NEOPLASM OF OVARY, UNSPECIFIED LATERALITY (HCC): Primary | ICD-10-CM

## 2024-02-05 DIAGNOSIS — N95.1 HOT FLASH, MENOPAUSAL: ICD-10-CM

## 2024-02-05 PROCEDURE — 99214 OFFICE O/P EST MOD 30 MIN: CPT | Performed by: OBSTETRICS & GYNECOLOGY

## 2024-02-05 RX ORDER — PAROXETINE 10 MG/1
10 TABLET, FILM COATED ORAL DAILY
Qty: 30 TABLET | Refills: 5 | Status: SHIPPED | OUTPATIENT
Start: 2024-02-05

## 2024-02-05 NOTE — ASSESSMENT & PLAN NOTE
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  The patient remains without evidence of recurrence of disease.  Her tumor markers and CAT scans normal.  Will see her back in 3 months with  and alpha-fetoprotein.  At 6 months we will add the CAT scan back.

## 2024-02-05 NOTE — ASSESSMENT & PLAN NOTE
Patient is having significant hot flashes due to menopausal symptoms.  Will recommend Paxil 10 mg p.o. daily to help to reduce symptoms.  Additionally patient is recommended to take 1000 mg of calcium and vitamin D

## 2024-02-05 NOTE — PROGRESS NOTES
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  The patient remains without evidence of recurrence of disease.  Her tumor markers and CAT scans normal.  Will see her back in 3 months with  and alpha-fetoprotein.  At 6 months we will add the CAT scan back.     Patient is having significant hot flashes due to menopausal symptoms.  Will recommend Paxil 10 mg p.o. daily to help to reduce symptoms.  Additionally patient is recommended to take 1000 mg of calcium and vitamin D     CHIEF COMPLAINT: Ovarian cancer surveillance      Problem:  Cancer Staging   Malignant neoplasm of ovary (HCC)  Staging form: Ovary, Fallopian Tube, Primary Peritoneal, AJCC 8th Edition  - Clinical stage from 7/11/2023: Stage IIB (cT2b, cN0, cM0) - Signed by Omer Monique MD on 7/11/2023        Previous therapy:  Oncology History   Malignant neoplasm of ovary (HCC)   6/13/2023 Surgery    Patient underwent exploratory laparotomy OCHOA/BSO and staging procedure.  Intra-Op findings revealed a large left adnexal mass approximately 12 to 14 cm densely adherent to the sigmoid colon.  It ruptured releasing chocolate fluid into the abdomen however prior staining of the omentum indicated likely previous rupture.    Final pathology report after extensive review by [facility] was most consistent with endometrioid adenocarcinoma initiating within endometriosis within the ovary.  Additionally yolk sac tumor was identified which extended to the local:.  Making this a stage IIB tumor.  Would recommend 4 cycles of bleomycin etoposide and cis-platinum.     7/11/2023 Initial Diagnosis    Malignant neoplasm of left ovary (HCC)     7/11/2023 -  Cancer Staged    Staging form: Ovary, Fallopian Tube, Primary Peritoneal, AJCC 8th Edition  - Clinical stage from 7/11/2023: Stage IIB (cT2b,  cN0, cM0) - Signed by Omer Monique MD on 7/11/2023  Histopathologic type: Yolk sac tumor  Stage prefix: Initial diagnosis       7/31/2023 -  Chemotherapy    alteplase (CATHFLO), 2 mg, Intracatheter, Every 1 Minute as needed, 4 of 4 cycles  pegfilgrastim (NEULASTA ONPRO), 6 mg, Subcutaneous, Once, 4 of 4 cycles  Administration: 6 mg (8/7/2023), 6 mg (8/28/2023), 6 mg (9/25/2023), 6 mg (10/16/2023)  bleomycin (BLENOXANE) IVPB, 30 Units, Intravenous, Once, 4 of 4 cycles  Administration: 30 Units (7/31/2023), 30 Units (8/7/2023), 30 Units (8/14/2023), 30 Units (8/21/2023), 30 Units (8/28/2023), 30 Units (9/5/2023), 30 Units (9/25/2023), 30 Units (10/2/2023), 30 Units (10/16/2023), 30 Units (10/23/2023), 30 Units (9/18/2023), 30 Units (10/9/2023)  CISplatin (PLATINOL) infusion, 20 mg/m2 = 38.8 mg, Intravenous, Once, 4 of 4 cycles  Administration: 38.8 mg (7/31/2023), 38.8 mg (8/1/2023), 38.8 mg (8/21/2023), 38.8 mg (8/2/2023), 38.8 mg (8/3/2023), 38.8 mg (8/4/2023), 38.8 mg (8/22/2023), 38.8 mg (8/23/2023), 38.8 mg (8/24/2023), 38.8 mg (8/25/2023), 39 mg (9/18/2023), 39 mg (9/19/2023), 39 mg (9/20/2023), 39 mg (9/21/2023), 39 mg (9/22/2023), 39 mg (10/9/2023), 39 mg (10/10/2023), 39 mg (10/11/2023), 39 mg (10/12/2023), 39 mg (10/13/2023)  fosaprepitant (EMEND) IVPB, 150 mg, Intravenous, Once, 4 of 4 cycles  Administration: 150 mg (7/31/2023), 150 mg (8/21/2023), 150 mg (9/18/2023), 150 mg (10/9/2023), 150 mg (10/13/2023)  etoposide (TOPOSAR), 100 mg/m2 = 194 mg, Intravenous, Once, 4 of 4 cycles  Administration: 194 mg (7/31/2023), 194 mg (8/1/2023), 194 mg (8/21/2023), 194 mg (8/2/2023), 194 mg (8/3/2023), 194 mg (8/4/2023), 194 mg (8/22/2023), 194 mg (8/23/2023), 194 mg (8/24/2023), 194 mg (8/25/2023), 200 mg (9/18/2023), 200 mg (9/19/2023), 200 mg (9/20/2023), 200 mg (9/21/2023), 200 mg (9/22/2023), 200 mg (10/9/2023), 200 mg (10/10/2023), 200 mg (10/11/2023), 200 mg (10/12/2023), 200 mg (10/13/2023)            Patient ID: Jennifer Hale is a 33 y.o. female  Patient is a very pleasant 33-year-old female with a history of stage IIb ovarian carcinoma with endometrioid and yolk sac features.  The patient underwent OCHOA/BSO staging followed by 4 cycles of cisplatin etoposide and bleomycin.  She tolerated treatment well.  She presents today in follow-up.  Initial diagnosis was in June 2023.   is normal at 8.  Alpha-fetoprotein is normal at 5.  And CT scan of the chest abdomen pelvis was performed revealing the following:  IMPRESSION:     No definite evidence of metastatic disease in the chest, abdomen or pelvis.  Improvement of multifocal airspace opacities with residual bands and groundglass attenuation, compatible with resolving pneumonia. Recommend follow-up in 3 to 6 months.    Today, the patient is doing well.  She denies significant abdominal pain, pelvic pain, nausea, vomiting, constipation, diarrhea, fevers, chills, or vaginal bleeding.  Additionally patient continues to have significant hot flashes throughout the day which are problematic for her.             The following portions of the patient's history were reviewed and updated as appropriate: allergies, current medications, past family history, past medical history, past social history, past surgical history, and problem list.    Review of Systems   Constitutional: Negative.    HENT: Negative.     Eyes: Negative.    Respiratory: Negative.     Cardiovascular: Negative.    Gastrointestinal: Negative.    Endocrine: Negative.    Genitourinary: Negative.    Musculoskeletal: Negative.    Skin: Negative.    Neurological: Negative.    Hematological: Negative.    Psychiatric/Behavioral: Negative.         Current Outpatient Medications   Medication Sig Dispense Refill    cholecalciferol (VITAMIN D3) 1,000 units tablet Take 5,000 Units by mouth daily      Multiple Vitamin (multivitamin) tablet Take 1 tablet by mouth daily      PARoxetine (PAXIL) 10 mg tablet  "Take 1 tablet (10 mg total) by mouth daily 30 tablet 5    CRANIAL PROSTHESIS, RX, Apply for drug-induced alopecia (Patient not taking: Reported on 1/30/2024) 1 each 0     No current facility-administered medications for this visit.           Objective:    Blood pressure 134/80, pulse 74, temperature 98.2 °F (36.8 °C), temperature source Temporal, resp. rate 16, height 5' 7\" (1.702 m), weight 86.1 kg (189 lb 12.8 oz), last menstrual period 05/15/2023, SpO2 99%.  Body mass index is 29.73 kg/m².  Body surface area is 1.98 meters squared.    Physical Exam  Constitutional:       Appearance: She is well-developed.   HENT:      Head: Normocephalic and atraumatic.   Neck:      Thyroid: No thyromegaly.   Cardiovascular:      Rate and Rhythm: Normal rate and regular rhythm.      Heart sounds: Normal heart sounds.   Pulmonary:      Effort: Pulmonary effort is normal.      Breath sounds: Normal breath sounds.   Abdominal:      General: Bowel sounds are normal.      Palpations: Abdomen is soft.      Comments: Well healed laparoscopic incisions.   Genitourinary:     Comments: -Normal external female genitalia, normal Bartholin's and Silverthorne's glands                  -Normal midline urethral meatus. No lesions notes                  -Bladder without fullness mass or tenderness                  -Vagina without lesion or discharge No significant cystocele or rectocele noted                  -Cervix surgically absent                  -Uterus surgically absent                  -Adnexae surgically absent                  - Anus without fissure of lesion    Musculoskeletal:         General: Normal range of motion.      Cervical back: Normal range of motion and neck supple.   Lymphadenopathy:      Cervical: No cervical adenopathy.   Skin:     General: Skin is warm and dry.   Neurological:      Mental Status: She is alert and oriented to person, place, and time.   Psychiatric:         Behavior: Behavior normal.         Lab Results "   Component Value Date     8.4 01/22/2024     Lab Results   Component Value Date    K 3.8 01/22/2024     01/22/2024    CO2 25 01/22/2024    BUN 12 01/22/2024    CREATININE 0.75 01/22/2024    GLUF 96 10/05/2023    CALCIUM 9.3 01/22/2024    CORRECTEDCA 10.0 06/02/2023    AST 23 01/22/2024    ALT 24 01/22/2024    ALKPHOS 62 01/22/2024    EGFR 103 01/22/2024     Lab Results   Component Value Date    WBC 5.36 01/22/2024    HGB 13.7 01/22/2024    HCT 42.2 01/22/2024    MCV 90 01/22/2024     01/22/2024     Lab Results   Component Value Date    NEUTROABS 2.72 01/22/2024        Trend:  Lab Results   Component Value Date     8.4 01/22/2024     9.2 11/09/2023     8.8 10/19/2023     9.9 10/05/2023     10.4 09/14/2023     9.6 09/07/2023     28.3 08/17/2023     25.4 07/29/2023     214.4 (H) 06/02/2023

## 2024-02-21 PROBLEM — J84.89 INTERSTITIAL PNEUMONITIS (HCC): Status: RESOLVED | Noted: 2024-01-17 | Resolved: 2024-02-21

## 2024-03-05 ENCOUNTER — TELEPHONE (OUTPATIENT)
Age: 34
End: 2024-03-05

## 2024-03-05 NOTE — TELEPHONE ENCOUNTER
Incoming call: Yuliet (dept of health)    Re: Pt of Dr. Martínez     Bronch done 12/6/23 showed no suspicion for TB - caller is requesting results of the following to be faxed:    12/6/23 AFB culture with stain  12/6/23 Pneumocystis smear by [facility]  12/6/23 Brochial culture and gram stain    Please fax to dept of health:  [phone]

## 2024-03-18 ENCOUNTER — PATIENT OUTREACH (OUTPATIENT)
Dept: HEMATOLOGY ONCOLOGY | Facility: CLINIC | Age: 34
End: 2024-03-18

## 2024-03-18 NOTE — PROGRESS NOTES
PT inquired about pending $90 bill. This writer placed an inquiry to [facility] team if this was covered under [facility] care. Will f/u with PT when resolved.         Emi Ceron MPH  Phone:[phone]  Email: [email or web address]

## 2024-04-19 DIAGNOSIS — N95.1 SYMPTOMATIC MENOPAUSAL OR FEMALE CLIMACTERIC STATES: ICD-10-CM

## 2024-04-19 DIAGNOSIS — C56.9 MALIGNANT NEOPLASM OF OVARY, UNSPECIFIED LATERALITY (HCC): Primary | ICD-10-CM

## 2024-04-21 ENCOUNTER — APPOINTMENT (OUTPATIENT)
Dept: RADIOLOGY | Facility: CLINIC | Age: 34
End: 2024-04-21
Payer: COMMERCIAL

## 2024-04-21 ENCOUNTER — OFFICE VISIT (OUTPATIENT)
Dept: URGENT CARE | Facility: CLINIC | Age: 34
End: 2024-04-21
Payer: COMMERCIAL

## 2024-04-21 VITALS
TEMPERATURE: 97.3 F | WEIGHT: 188 LBS | BODY MASS INDEX: 29.51 KG/M2 | HEART RATE: 86 BPM | OXYGEN SATURATION: 99 % | HEIGHT: 67 IN | SYSTOLIC BLOOD PRESSURE: 122 MMHG | RESPIRATION RATE: 17 BRPM | DIASTOLIC BLOOD PRESSURE: 84 MMHG

## 2024-04-21 DIAGNOSIS — S82.445A CLOSED NONDISPLACED SPIRAL FRACTURE OF SHAFT OF LEFT FIBULA, INITIAL ENCOUNTER: Primary | ICD-10-CM

## 2024-04-21 DIAGNOSIS — S99.912A INJURY OF LEFT ANKLE, INITIAL ENCOUNTER: ICD-10-CM

## 2024-04-21 PROCEDURE — 73610 X-RAY EXAM OF ANKLE: CPT

## 2024-04-21 PROCEDURE — 73630 X-RAY EXAM OF FOOT: CPT

## 2024-04-21 PROCEDURE — 99213 OFFICE O/P EST LOW 20 MIN: CPT

## 2024-04-21 PROCEDURE — 73590 X-RAY EXAM OF LOWER LEG: CPT

## 2024-04-21 NOTE — PROGRESS NOTES
"  [facility]        NAME: Jennifer Hale is a 33 y.o. female  : 1990    MRN: [id number]  DATE: 2024  TIME: 12:42 PM    Assessment and Plan   Closed nondisplaced spiral fracture of shaft of left fibula, initial encounter [S82.445A]  1. Closed nondisplaced spiral fracture of shaft of left fibula, initial encounter  XR ankle 3+ vw left    XR tibia fibula 2 vw left    XR foot 3+ vw left    Ambulatory Referral to Orthopedic Surgery        Discussed problem w/ patient. Spiral fracture of ankle. Placed into long splint and advised non-weight bearing. Already has crutches. Tylenol and ibuprofen for pain. Advised to keep elevated. Report to er if symptoms worsen.    Patient Instructions       Follow up with PCP in 3-5 days.  Proceed to  ER if symptoms worsen.    If tests are performed, our office will contact you with results only if changes need to made to the care plan discussed with you at the visit. [facility]    Chief Complaint     Chief Complaint   Patient presents with   • Ankle Pain     Pt states she was playing roller [employer] this afternoon when she twisted her right left when she felt a \"snap\"         History of Present Illness       Pt states she was playing roller [employer] this afternoon when she twisted her right left when she felt a \"snap\". 4/10 pain. Unable to bare-weight    Ankle Pain         Review of Systems   Review of Systems   Constitutional:  Negative for appetite change, chills, fatigue and fever.   Respiratory:  Negative for cough, shortness of breath, wheezing and stridor.    Cardiovascular:  Negative for chest pain and palpitations.   Musculoskeletal:  Positive for gait problem and joint swelling.         Current Medications       Current Outpatient Medications:   •  cholecalciferol (VITAMIN D3) 1,000 units tablet, Take 5,000 Units by mouth daily, Disp: , Rfl:   •  fezolinetant (Veozah) tablet, Take 1 tablet (45 mg total) by " "mouth daily, Disp: 30 tablet, Rfl: 2  •  Multiple Vitamin (multivitamin) tablet, Take 1 tablet by mouth daily, Disp: , Rfl:   •  PARoxetine (PAXIL) 10 mg tablet, Take 1 tablet (10 mg total) by mouth daily, Disp: 30 tablet, Rfl: 5  •  CRANIAL PROSTHESIS, RX,, Apply for drug-induced alopecia (Patient not taking: Reported on 1/30/2024), Disp: 1 each, Rfl: 0    Current Allergies     Allergies as of 04/21/2024 - Reviewed 04/21/2024   Allergen Reaction Noted   • Penicillins Itching and Rash 03/26/2008            The following portions of the patient's history were reviewed and updated as appropriate: allergies, current medications, past family history, past medical history, past social history, past surgical history and problem list.     Past Medical History:   Diagnosis Date   • Cancer (HCC)     Ovarian   • No known health problems    • PONV (postoperative nausea and vomiting)        Past Surgical History:   Procedure Laterality Date   • HYSTERECTOMY N/A 6/13/2023    Procedure: OCHOA/BSO, STAGING radical omentectomy bilateral pelvic lymph node dissection;  Surgeon: Omer Monique MD;  [facility];  Service: Gynecology Oncology   • IR PORT PLACEMENT  7/24/2023   • IR PORT REMOVAL  11/20/2023   • ORIF TIBIA & FIBULA FRACTURES Right    • CO EXPLORATORY LAPAROTOMY CELIOTOMY W/WO BIOPSY SPX N/A 6/13/2023    Procedure: EX LAP;  Surgeon: Omer Monique MD;  [facility];  Service: Gynecology Oncology   • URETERAL STENT PLACEMENT Bilateral 6/13/2023    Procedure: CYSTO; STENT URETERAL;  Surgeon: Giancarlo Monroe MD;  [facility];  Service: Urology   • WISDOM TOOTH EXTRACTION         Family History   Problem Relation Age of Onset   • Ovarian cysts Mother    • No Known Problems Father          Medications have been verified.        Objective   /84   Pulse 86   Temp (!) 97.3 °F (36.3 °C)   Resp 17   Ht 5' 7\" (1.702 m)   Wt 85.3 kg (188 lb)   LMP 05/15/2023 (Exact Date)   SpO2 99%   BMI " 29.44 kg/m²        Physical Exam     Physical Exam  Vitals and nursing note reviewed.   Constitutional:       General: She is not in acute distress.     Appearance: Normal appearance. She is normal weight. She is not ill-appearing, toxic-appearing or diaphoretic.   Cardiovascular:      Rate and Rhythm: Normal rate and regular rhythm.      Pulses: Normal pulses.      Heart sounds: Normal heart sounds. No murmur heard.     No friction rub. No gallop.   Pulmonary:      Effort: Pulmonary effort is normal. No respiratory distress.      Breath sounds: Normal breath sounds. No stridor. No wheezing, rhonchi or rales.   Chest:      Chest wall: No tenderness.   Musculoskeletal:      Left ankle: Swelling present. No deformity, ecchymosis or lacerations. Tenderness present over the lateral malleolus. Decreased range of motion. Anterior drawer test negative. Normal pulse.      Left Achilles Tendon: Normal. No tenderness or defects. Robert's test negative.      Left foot: Normal.      Comments: Denies any numbness or tingling in extremity.    Neurological:      Mental Status: She is alert.

## 2024-04-21 NOTE — LETTER
April 21, 2024     Patient: Jennifer Hale   YOB: 1990   Date of Visit: 4/21/2024       To Whom it May Concern:    Jennifer Hale was seen in my clinic on 4/21/2024. She may return to work on 04/24 .    If you have any questions or concerns, please don't hesitate to call.         Sincerely,          Steffen Pappas PA-C        CC: No Recipients

## 2024-04-22 ENCOUNTER — OFFICE VISIT (OUTPATIENT)
Dept: OBGYN CLINIC | Facility: CLINIC | Age: 34
End: 2024-04-22
Payer: COMMERCIAL

## 2024-04-22 ENCOUNTER — HOSPITAL ENCOUNTER (OUTPATIENT)
Dept: RADIOLOGY | Facility: CLINIC | Age: 34
Discharge: HOME | End: 2024-04-22
Payer: COMMERCIAL

## 2024-04-22 VITALS
BODY MASS INDEX: 29.51 KG/M2 | HEIGHT: 67 IN | WEIGHT: 188 LBS | TEMPERATURE: 97.8 F | HEART RATE: 90 BPM | DIASTOLIC BLOOD PRESSURE: 80 MMHG | SYSTOLIC BLOOD PRESSURE: 140 MMHG

## 2024-04-22 DIAGNOSIS — S82.445A CLOSED NONDISPLACED SPIRAL FRACTURE OF SHAFT OF LEFT FIBULA, INITIAL ENCOUNTER: ICD-10-CM

## 2024-04-22 DIAGNOSIS — S82.445A CLOSED NONDISPLACED SPIRAL FRACTURE OF SHAFT OF LEFT FIBULA, INITIAL ENCOUNTER: Primary | ICD-10-CM

## 2024-04-22 DIAGNOSIS — M25.572 ACUTE LEFT ANKLE PAIN: ICD-10-CM

## 2024-04-22 PROCEDURE — 73600 X-RAY EXAM OF ANKLE: CPT

## 2024-04-22 PROCEDURE — 99204 OFFICE O/P NEW MOD 45 MIN: CPT | Performed by: ORTHOPAEDIC SURGERY

## 2024-04-22 PROCEDURE — 29405 APPL SHORT LEG CAST: CPT | Performed by: ORTHOPAEDIC SURGERY

## 2024-04-22 NOTE — LETTER
April 22, 2024     Patient: Jennifer Hale  YOB: 1990  Date of Visit: 4/22/2024      To Whom it May Concern:    Jennifer Hale is under my professional care. Jennifer was seen in my office on 4/22/2024. Jennifer may return to work on 4/23/2024.  She is to be nonweightbearing left lower extremity utilizing crutch assistance.  She is permitted to work in a sedentary capacity and is able to ambulate as necessary but must remain nonweightbearing.  This restriction will remain in effect until she is rechecked in 1 week and, thereafter, for approximately 6 weeks from today .    If you have any questions or concerns, please don't hesitate to call.         Sincerely,          Andre Drew        CC: No Recipients

## 2024-04-22 NOTE — PROGRESS NOTES
ASSESSMENT/PLAN:    Diagnoses and all orders for this visit:    Closed nondisplaced spiral fracture of shaft of left fibula, initial encounter  -     Ambulatory Referral to Orthopedic Surgery  -     XR ankle 2 vw left; Future  -     Cast application    Acute left ankle pain        Plan:   Patient is experiencing a closed nondisplaced fracture of left fibula.  X-rays were obtained and reviewed with patient at today's visit.  Gravity stress view was obtained at today's visit demonstrating adequate alignment of joint space.  I discussed operative and nonoperative treatment options with patient at today's visit. I discussed with patient these fractures may take 2-3 months to completely heal. At this point in time, short leg cast of left ankle is a reasonable thing to proceed with.  Short leg cast was applied at today's visit.  Patient tolerated this well. Discussed with patient she needs to be NWB for at least 6 weeks.  Work note was given to patient stating she is to be nonweightbearing of left lower extremity. Discussed with patient if alignment of fracture worsens over time surgical intervention would be considered in the future.  Patient will follow-up in 1 week for repeat left ankle x-rays.  Precautions have been reviewed, instructions provided and questions answered.    Return in about 1 week (around 4/29/2024) for Repeat XRays.      _____________________________________________________  CHIEF COMPLAINT:  Chief Complaint   Patient presents with    Left Ankle - Fracture         SUBJECTIVE:  Jennifer Hale is a 33 y.o. year old female who presents for initial evaluation of left ankle pain.  Patient states has been going on for about 1 day.  Patient states yesterday she was at roller [employer] practice and was doing a spin move and she states she twisted her ankle which inverted and heard a pop.  Patient states she stopped practice immediately and was able to go to urgent care where she was able to obtain x-rays and  was given a splint.  Patient states she has been taking Tylenol and ibuprofen as needed for pain control.  Patient states she has had no previous injuries to her left ankle and has had no previous surgeries to her left ankle. Denies numbness and tingling.  Patient does state she had surgery done to her right ankle in the past and feels this is like a similar situation.  She does complain of some left ankle.  She noted this while leaning on a chair after the injury.  She does not believe the elbow pain is a result of the injury itself.  She denies upper extremity paresthesias.          PAST MEDICAL HISTORY:  Past Medical History:   Diagnosis Date    Cancer (HCC)     Ovarian    No known health problems     PONV (postoperative nausea and vomiting)        PAST SURGICAL HISTORY:  Past Surgical History:   Procedure Laterality Date    HYSTERECTOMY N/A 6/13/2023    Procedure: OCHOA/BSO, STAGING radical omentectomy bilateral pelvic lymph node dissection;  Surgeon: Omer Monique MD;  [facility];  Service: Gynecology Oncology    IR PORT PLACEMENT  7/24/2023    IR PORT REMOVAL  11/20/2023    ORIF TIBIA & FIBULA FRACTURES Right     CA EXPLORATORY LAPAROTOMY CELIOTOMY W/WO BIOPSY SPX N/A 6/13/2023    Procedure: EX LAP;  Surgeon: Omer Monique MD;  [facility];  Service: Gynecology Oncology    URETERAL STENT PLACEMENT Bilateral 6/13/2023    Procedure: CYSTO; STENT URETERAL;  Surgeon: Giancarlo Monroe MD;  [facility];  Service: Urology    WISDOM TOOTH EXTRACTION         FAMILY HISTORY:  Family History   Problem Relation Age of Onset    Ovarian cysts Mother     No Known Problems Father        SOCIAL HISTORY:  Social History     Tobacco Use    Smoking status: Never    Smokeless tobacco: Never   Vaping Use    Vaping status: Never Used   Substance Use Topics    Alcohol use: Yes     Alcohol/week: 5.0 standard drinks of alcohol     Types: 5 Standard drinks or equivalent per week     Comment: Occasional  "   Drug use: Yes     Types: Marijuana     Comment: daily vape last used 6/12       MEDICATIONS:    Current Outpatient Medications:     cholecalciferol (VITAMIN D3) 1,000 units tablet, Take 5,000 Units by mouth daily, Disp: , Rfl:     fezolinetant (Veozah) tablet, Take 1 tablet (45 mg total) by mouth daily, Disp: 30 tablet, Rfl: 2    Multiple Vitamin (multivitamin) tablet, Take 1 tablet by mouth daily, Disp: , Rfl:     PARoxetine (PAXIL) 10 mg tablet, Take 1 tablet (10 mg total) by mouth daily, Disp: 30 tablet, Rfl: 5    CRANIAL PROSTHESIS, RX,, Apply for drug-induced alopecia (Patient not taking: Reported on 1/30/2024), Disp: 1 each, Rfl: 0    ALLERGIES:  Allergies   Allergen Reactions    Penicillins Itching and Rash     Rash  Rash         Review of systems:   Constitutional: Negative for fatigue, fever or loss of apetite.   HENT: Negative.    Respiratory: Negative for shortness of breath, dyspnea.    Cardiovascular: Negative for chest pain/tightness.   Gastrointestinal: Negative for abdominal pain, N/V.   Endocrine: Negative for cold/heat intolerance, unexplained weight loss/gain.   Genitourinary: Negative for flank pain, dysuria, hematuria.   Musculoskeletal: left ankle pain   Skin: Negative for rash.    Neurological: wnl  Psychiatric/Behavioral: Negative for agitation.  _____________________________________________________  PHYSICAL EXAMINATION:    Blood pressure 140/80, pulse 90, temperature 97.8 °F (36.6 °C), temperature source Temporal, height 5' 7\" (1.702 m), weight 85.3 kg (188 lb), last menstrual period 05/15/2023.    General: well developed and well nourished, alert, oriented times 3, and appears comfortable  Psychiatric: Normal  HEENT: Benign  Cardiovascular: Regular    Pulmonary: No wheezing or stridor  Abdomen: Soft, Nontender  Skin: No masses, erythema, lacerations, fluctation, ulcerations  Neurovascular: WNL    MUSCULOSKELETAL EXAMINATION:    Left ankle:  Moderate swelling  Significant " ecchymosis.  Limited ROM due to pain and swelling  Able to wiggle toes  TTP distal fibula a few centimeters proximal to the ankle joint  Nontender over deltoid ligament, distal tibia and lateral ankle ligaments  - squeeze test  Distal 2+ pulse     The left upper extremity exam demonstrates good range of motion without localized tenderness and a negative Tinel's over the ulnar nerve.        _____________________________________________________  STUDIES REVIEWED:  Images were reviewed dated 4/21/2022 including x-rays of the tibia and fibula, ankle and foot demonstrating a fracture of the distal fibular shaft with a minimal degree of valgus angulation.  There is no evidence of joint space or mortise malalignment.  Gravity stress x-ray obtained today demonstrated no evidence of medial space opening.    The [facility] note was reviewed.      PROCEDURES:  Cast application    Date/Time: 4/22/2024 8:15 AM    Performed by: Andre Drew  Authorized by: Andre Drew  Universal Protocol:  Consent: Verbal consent obtained. Written consent not obtained.  Risks and benefits: risks, benefits and alternatives were discussed  Consent given by: patient  Patient understanding: patient states understanding of the procedure being performed  Site marked: the operative site was marked  Patient identity confirmed: verbally with patient    Pre-procedure details:     Sensation:  Normal  Procedure details:     Laterality:  Left    Location:  AnkleCast type:  Short leg        Supplies:  Cotton padding, 3 layer wrap and fiberglass        Scribe Attestation      I,:  Max Brown am acting as a scribe while in the presence of the attending physician.:       I,:  Andre Drew personally performed the services described in this documentation    as scribed in my presence.:

## 2024-04-29 ENCOUNTER — OFFICE VISIT (OUTPATIENT)
Dept: OBGYN CLINIC | Facility: CLINIC | Age: 34
End: 2024-04-29
Payer: COMMERCIAL

## 2024-04-29 ENCOUNTER — HOSPITAL ENCOUNTER (OUTPATIENT)
Dept: RADIOLOGY | Facility: CLINIC | Age: 34
Discharge: HOME | End: 2024-04-29
Payer: COMMERCIAL

## 2024-04-29 VITALS
WEIGHT: 188 LBS | HEIGHT: 67 IN | DIASTOLIC BLOOD PRESSURE: 98 MMHG | HEART RATE: 90 BPM | BODY MASS INDEX: 29.51 KG/M2 | TEMPERATURE: 97.8 F | SYSTOLIC BLOOD PRESSURE: 150 MMHG

## 2024-04-29 DIAGNOSIS — S82.445D CLOSED NONDISPLACED SPIRAL FRACTURE OF SHAFT OF LEFT FIBULA WITH ROUTINE HEALING, SUBSEQUENT ENCOUNTER: ICD-10-CM

## 2024-04-29 DIAGNOSIS — S82.445D CLOSED NONDISPLACED SPIRAL FRACTURE OF SHAFT OF LEFT FIBULA WITH ROUTINE HEALING, SUBSEQUENT ENCOUNTER: Primary | ICD-10-CM

## 2024-04-29 PROCEDURE — 73610 X-RAY EXAM OF ANKLE: CPT

## 2024-04-29 PROCEDURE — 27786 TREATMENT OF ANKLE FRACTURE: CPT | Performed by: ORTHOPAEDIC SURGERY

## 2024-04-29 NOTE — PROGRESS NOTES
"Patient Name:  Jennifer Hale  MRN:  [id number]    Assessment     1. Closed nondisplaced spiral fracture of shaft of left fibula with routine healing, subsequent encounter  XR ankle 3+ vw left          Plan     I would recommend follow-up in 1 week.  Once again, x-rays will be obtained including weightbearing AP, lateral and mortise views of the ankle in her cast.  In the interim, she is to be nonweightbearing left lower extremity.  She is to contact the office if questions or concerns arise.    Return in about 1 week (around 5/6/2024).      Subjective   Jennifer Hale returns for follow-up of her distal fibular fracture.  The patient is 8 day(s) post injury and returns for routine follow-up. Patient complains of some intermittent pain, but uses pain medication infrequently.  She denies paresthesias.      Objective     /98 (BP Location: Left arm)   Pulse 90   Temp 97.8 °F (36.6 °C)   Ht 5' 7\" (1.702 m)   Wt 85.3 kg (188 lb)   LMP 05/15/2023 (Exact Date)   BMI 29.44 kg/m²     The exam demonstrates the cast in place.  The toes demonstrate good color and capillary refill and no significant swelling.  Calf compartments are soft and nontender proximally.    Fracture / Dislocation Treatment    Date/Time: 4/29/2024 8:15 AM    Performed by: Andre Drew  Authorized by: Andre Drew    Patient Location:  Clinic  [place] Protocol:  Consent: Verbal consent obtained. Written consent not obtained.  Risks and benefits: risks, benefits and alternatives were discussed  Consent given by: patient  Patient understanding: patient states understanding of the procedure being performed  Test results: test results available and properly labeled  Radiology Images displayed and confirmed. If images not available, report reviewed: imaging studies available    Injury location:  Ankle  Location details:  Left ankle  Injury type:  Fracture  Fracture type: lateral malleolus    Fracture type: lateral malleolus  "   Neurovascular status: Neurovascularly intact    Local anesthesia used?: No    General anesthesia used?: No    Cast type:  Short leg  Neurovascular status: Neurovascularly intact    Patient tolerance:  Patient tolerated the procedure well with no immediate complications          Data Review     I have personally reviewed pertinent films in PACS demonstrating the fracture to remain acceptably aligned.    Andre Drew

## 2024-04-30 ENCOUNTER — TELEPHONE (OUTPATIENT)
Dept: GYNECOLOGIC ONCOLOGY | Facility: CLINIC | Age: 34
End: 2024-04-30

## 2024-04-30 ENCOUNTER — APPOINTMENT (OUTPATIENT)
Dept: LAB | Facility: CLINIC | Age: 34
End: 2024-04-30
Payer: COMMERCIAL

## 2024-04-30 DIAGNOSIS — C56.9 MALIGNANT NEOPLASM OF OVARY, UNSPECIFIED LATERALITY (HCC): ICD-10-CM

## 2024-04-30 DIAGNOSIS — N95.1 SYMPTOMATIC MENOPAUSAL OR FEMALE CLIMACTERIC STATES: ICD-10-CM

## 2024-04-30 LAB
AFP-TM SERPL-MCNC: 6.46 NG/ML (ref 0–9)
CANCER AG125 SERPL-ACNC: 6.1 U/ML (ref 0–35)

## 2024-04-30 PROCEDURE — 82105 ALPHA-FETOPROTEIN SERUM: CPT

## 2024-04-30 PROCEDURE — 80053 COMPREHEN METABOLIC PANEL: CPT | Performed by: NURSE PRACTITIONER

## 2024-04-30 PROCEDURE — 85025 COMPLETE CBC W/AUTO DIFF WBC: CPT | Performed by: NURSE PRACTITIONER

## 2024-04-30 PROCEDURE — 83735 ASSAY OF MAGNESIUM: CPT | Performed by: NURSE PRACTITIONER

## 2024-04-30 PROCEDURE — 36415 COLL VENOUS BLD VENIPUNCTURE: CPT

## 2024-04-30 PROCEDURE — 86304 IMMUNOASSAY TUMOR CA 125: CPT

## 2024-04-30 NOTE — TELEPHONE ENCOUNTER
LMOM for patient to see if she was able to fill Veozah rx. Insurance wanted liver enzyme testing first. CMP in process from this morning.

## 2024-05-06 ENCOUNTER — OFFICE VISIT (OUTPATIENT)
Dept: OBGYN CLINIC | Facility: CLINIC | Age: 34
End: 2024-05-06

## 2024-05-06 ENCOUNTER — OFFICE VISIT (OUTPATIENT)
Dept: GYNECOLOGIC ONCOLOGY | Facility: CLINIC | Age: 34
End: 2024-05-06
Payer: COMMERCIAL

## 2024-05-06 ENCOUNTER — HOSPITAL ENCOUNTER (OUTPATIENT)
Dept: RADIOLOGY | Facility: CLINIC | Age: 34
Discharge: HOME | End: 2024-05-06
Payer: COMMERCIAL

## 2024-05-06 VITALS
TEMPERATURE: 97.6 F | SYSTOLIC BLOOD PRESSURE: 130 MMHG | DIASTOLIC BLOOD PRESSURE: 70 MMHG | HEIGHT: 67 IN | HEART RATE: 80 BPM | BODY MASS INDEX: 29.51 KG/M2 | WEIGHT: 188 LBS

## 2024-05-06 VITALS
SYSTOLIC BLOOD PRESSURE: 118 MMHG | OXYGEN SATURATION: 97 % | HEIGHT: 67 IN | TEMPERATURE: 97.9 F | HEART RATE: 87 BPM | BODY MASS INDEX: 29.44 KG/M2 | DIASTOLIC BLOOD PRESSURE: 84 MMHG

## 2024-05-06 DIAGNOSIS — M81.0 HIGH RISK FOR FRACTURE DUE TO OSTEOPOROSIS BY DEXA SCAN: ICD-10-CM

## 2024-05-06 DIAGNOSIS — C56.9 MALIGNANT NEOPLASM OF OVARY, UNSPECIFIED LATERALITY (HCC): Primary | ICD-10-CM

## 2024-05-06 DIAGNOSIS — S82.445D CLOSED NONDISPLACED SPIRAL FRACTURE OF SHAFT OF LEFT FIBULA WITH ROUTINE HEALING, SUBSEQUENT ENCOUNTER: Primary | ICD-10-CM

## 2024-05-06 DIAGNOSIS — S82.445D CLOSED NONDISPLACED SPIRAL FRACTURE OF SHAFT OF LEFT FIBULA WITH ROUTINE HEALING, SUBSEQUENT ENCOUNTER: ICD-10-CM

## 2024-05-06 DIAGNOSIS — Z13.820 OSTEOPOROSIS SCREENING: ICD-10-CM

## 2024-05-06 PROCEDURE — 73610 X-RAY EXAM OF ANKLE: CPT

## 2024-05-06 PROCEDURE — 99024 POSTOP FOLLOW-UP VISIT: CPT | Performed by: ORTHOPAEDIC SURGERY

## 2024-05-06 PROCEDURE — 99214 OFFICE O/P EST MOD 30 MIN: CPT | Performed by: OBSTETRICS & GYNECOLOGY

## 2024-05-06 NOTE — PROGRESS NOTES
"Patient Name:  Jennifer Hale  MRN:  [id number]    Assessment     1. Closed nondisplaced spiral fracture of shaft of left fibula with routine healing, subsequent encounter  XR ankle 3+ vw left        Plan     Patient to continue nonweightbearing on the left foot and follow-up in 4 weeks with repeat x-rays of the left ankle with the cast removed.  She may use the knee scooter or iwalk ambulation devices.  Again avoid weightbearing on the foot.  May be seen sooner for any concerns or issues.  May consider using baby aspirin twice daily as prophylaxis due to her previous cancer history and presumed increased coagulability.    Return in about 4 weeks (around 6/3/2024) for Cast off x-ray, Recheck.    Subjective   Jennifer Hale returns for follow-up of left closed nondisplaced distal fibular shaft spiral fracture, DOI 4/21/2024.  The patient is 2 week(s) post injury and returns for routine follow-up and x-ray. Patient denies problems with cast fit.  She notes that sometimes the cast seems little tighter and other times looser depending upon the amount of time she is up on her feet.  Denies calf pain chest pain or shortness of breath.  Denies numbness or tingling.  Patient is 6 months out from total hysterectomy for ovarian CA.  He has a bone density test scheduled for tomorrow through her GYN.    Objective     /70   Pulse 80   Temp 97.6 °F (36.4 °C) (Temporal)   Ht 5' 7\" (1.702 m)   Wt 85.3 kg (188 lb)   LMP 05/15/2023 (Exact Date)   BMI 29.44 kg/m²     Left appropriately fitting short leg cast does not show signs of any weightbearing or breakdown.  The toes are warm and pink with sensation to all 5 toes.  Gross range of motion of the toes is intact.  Minimal loosening of the proximal aspect of the cast.    Data Review     I have personally reviewed pertinent films in PACS documenting maintained position and alignment of the ankle mortise and fibular fracture alignment unchanged from last " x-rays.    Harsha Pineda PA-C

## 2024-05-06 NOTE — PATIENT INSTRUCTIONS
Patient to continue nonweightbearing on the left foot and follow-up in 4 weeks with repeat x-rays of the left ankle with the cast removed.  She may use the knee scooter or Volumental ambulation devices.  Again avoid weightbearing on the foot.  May be seen sooner for any concerns or issues.

## 2024-05-06 NOTE — ASSESSMENT & PLAN NOTE
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  Patient remains in clinical remission by exam and blood work.  She will follow-up in 3 months for repeat evaluation.    Patient is at risk for osteoporosis due to postmenopausal status due to surgical removal of ovaries.  A DEXA scan will be performed as she has just developed a fracture.

## 2024-05-06 NOTE — PROGRESS NOTES
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  Patient remains in clinical remission by exam and blood work.  She will follow-up in 3 months for repeat evaluation.    Patient is at risk for osteoporosis due to postmenopausal status due to surgical removal of ovaries.  A DEXA scan will be performed as she has just developed a fracture.         Relevant Orders        AFP tumor marker     Other Visit Diagnoses       High risk for fracture due to osteoporosis by DEXA scan        Osteoporosis screening        Relevant Orders    DXA bone density spine hip and pelvis              CHIEF COMPLAINT: Follow-up surveillance stage IIb ovarian adenocarcinoma with mixed endometrioid and yolk sac components      Problem:  Cancer Staging   Malignant neoplasm of ovary (HCC)  Staging form: Ovary, Fallopian Tube, Primary Peritoneal, AJCC 8th Edition  - Clinical stage from 7/11/2023: Stage IIB (cT2b, cN0, cM0) - Signed by Omer Monique MD on 7/11/2023        Previous therapy:  Oncology History   Malignant neoplasm of ovary (HCC)   6/13/2023 Surgery    Patient underwent exploratory laparotomy OCHOA/BSO and staging procedure.  Intra-Op findings revealed a large left adnexal mass approximately 12 to 14 cm densely adherent to the sigmoid colon.  It ruptured releasing chocolate fluid into the abdomen however prior staining of the omentum indicated likely previous rupture.    Final pathology report after extensive review by [facility] was most consistent with endometrioid adenocarcinoma initiating within endometriosis within the ovary.  Additionally yolk sac tumor was identified which extended to the local:.  Making this a stage IIB tumor.  Would recommend 4 cycles of bleomycin etoposide and cis-platinum.      7/11/2023 Initial Diagnosis    Malignant neoplasm of left ovary (HCC)     7/11/2023 -  Cancer Staged    Staging form: Ovary, Fallopian Tube, Primary Peritoneal, AJCC 8th Edition  - Clinical stage from 7/11/2023: Stage IIB (cT2b, cN0, cM0) - Signed by Omer Monique MD on 7/11/2023  Histopathologic type: Yolk sac tumor  Stage prefix: Initial diagnosis       7/31/2023 -  Chemotherapy    alteplase (CATHFLO), 2 mg, Intracatheter, Every 1 Minute as needed, 4 of 4 cycles  pegfilgrastim (NEULASTA ONPRO), 6 mg, Subcutaneous, Once, 4 of 4 cycles  Administration: 6 mg (8/7/2023), 6 mg (8/28/2023), 6 mg (9/25/2023), 6 mg (10/16/2023)  bleomycin (BLENOXANE) IVPB, 30 Units, Intravenous, Once, 4 of 4 cycles  Administration: 30 Units (7/31/2023), 30 Units (8/7/2023), 30 Units (8/14/2023), 30 Units (8/21/2023), 30 Units (8/28/2023), 30 Units (9/5/2023), 30 Units (9/25/2023), 30 Units (10/2/2023), 30 Units (10/16/2023), 30 Units (10/23/2023), 30 Units (9/18/2023), 30 Units (10/9/2023)  CISplatin (PLATINOL) infusion, 20 mg/m2 = 38.8 mg, Intravenous, Once, 4 of 4 cycles  Administration: 38.8 mg (7/31/2023), 38.8 mg (8/1/2023), 38.8 mg (8/21/2023), 38.8 mg (8/2/2023), 38.8 mg (8/3/2023), 38.8 mg (8/4/2023), 38.8 mg (8/22/2023), 38.8 mg (8/23/2023), 38.8 mg (8/24/2023), 38.8 mg (8/25/2023), 39 mg (9/18/2023), 39 mg (9/19/2023), 39 mg (9/20/2023), 39 mg (9/21/2023), 39 mg (9/22/2023), 39 mg (10/9/2023), 39 mg (10/10/2023), 39 mg (10/11/2023), 39 mg (10/12/2023), 39 mg (10/13/2023)  fosaprepitant (EMEND) IVPB, 150 mg, Intravenous, Once, 4 of 4 cycles  Administration: 150 mg (7/31/2023), 150 mg (8/21/2023), 150 mg (9/18/2023), 150 mg (10/9/2023), 150 mg (10/13/2023)  etoposide (TOPOSAR), 100 mg/m2 = 194 mg, Intravenous, Once, 4 of 4 cycles  Administration: 194 mg (7/31/2023), 194 mg (8/1/2023), 194 mg (8/21/2023), 194 mg (8/2/2023), 194 mg (8/3/2023), 194 mg (8/4/2023), 194 mg (8/22/2023), 194 mg (8/23/2023), 194 mg (8/24/2023), 194  mg (8/25/2023), 200 mg (9/18/2023), 200 mg (9/19/2023), 200 mg (9/20/2023), 200 mg (9/21/2023), 200 mg (9/22/2023), 200 mg (10/9/2023), 200 mg (10/10/2023), 200 mg (10/11/2023), 200 mg (10/12/2023), 200 mg (10/13/2023)           Patient ID: Jennifer Hale is a 33 y.o. female  Patient is a very pleasant 33-year-old female status post OCHOA/BSO and surgical staging following large ruptured adnexal mass identified preoperatively.  Final pathology report revealed concurrent endometrioid and yolk sac components.  The patient underwent 4 cycles of cisplatin etoposide and bleomycin.  She tolerated treatment with a moderate degree of difficulty and is now in a clinical remission.  Patient presents today for follow-up.    Patient  remains normal at 6.  Her AFP is also normal.  She has no complaint.Today, the patient is doing well.  She denies significant abdominal pain, pelvic pain, nausea, vomiting, constipation, diarrhea, fevers, chills, or vaginal bleeding.  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  She has never had a DEXA scan.  She is also discontinued her antidepressants for hot flashes.  She is interested in the veozah but would like to research this further        The following portions of the patient's history were reviewed and updated as appropriate: allergies, current medications, past family history, past medical history, past social history, past surgical history, and problem list.    Review of Systems   Constitutional: Negative.    HENT: Negative.     Eyes: Negative.    Respiratory: Negative.     Cardiovascular: Negative.    Gastrointestinal: Negative.    Endocrine: Negative.    Genitourinary: Negative.    Musculoskeletal: Negative.    Skin: Negative.    Neurological: Negative.    Hematological: Negative.    Psychiatric/Behavioral: Negative.         Current Outpatient Medications   Medication Sig Dispense Refill    cholecalciferol (VITAMIN  "D3) 1,000 units tablet Take 5,000 Units by mouth daily      Multiple Vitamin (multivitamin) tablet Take 1 tablet by mouth daily      CRANIAL PROSTHESIS, RX, Apply for drug-induced alopecia (Patient not taking: Reported on 1/30/2024) 1 each 0    fezolinetant (Veozah) tablet Take 1 tablet (45 mg total) by mouth daily (Patient not taking: Reported on 4/29/2024) 30 tablet 2    PARoxetine (PAXIL) 10 mg tablet Take 1 tablet (10 mg total) by mouth daily 30 tablet 5     No current facility-administered medications for this visit.           Objective:    Blood pressure 118/84, pulse 87, temperature 97.9 °F (36.6 °C), height 5' 7\" (1.702 m), last menstrual period 05/15/2023, SpO2 97%.  Body mass index is 29.44 kg/m².  Body surface area is 1.97 meters squared.    Physical Exam  Constitutional:       Appearance: She is well-developed.      Comments: Left lower extremity casted   HENT:      Head: Normocephalic and atraumatic.   Neck:      Thyroid: No thyromegaly.   Cardiovascular:      Rate and Rhythm: Normal rate and regular rhythm.      Heart sounds: Normal heart sounds.   Pulmonary:      Effort: Pulmonary effort is normal.      Breath sounds: Normal breath sounds.   Abdominal:      General: Bowel sounds are normal.      Palpations: Abdomen is soft.      Comments: Well healed laparoscopic incisions.   Genitourinary:     Comments: -Normal external female genitalia, normal Bartholin's and [place]'s glands                  -Normal midline urethral meatus. No lesions notes                  -Bladder without fullness mass or tenderness                  -Vagina without lesion or discharge No significant cystocele or rectocele noted                  -Cervix surgically absent                  -Uterus surgically absent                  -Adnexae surgically absent                  - Anus without fissure of lesion    Musculoskeletal:         General: Normal range of motion.      Cervical back: Normal range of motion and neck supple. "   Lymphadenopathy:      Cervical: No cervical adenopathy.   Skin:     General: Skin is warm and dry.   Neurological:      Mental Status: She is alert and oriented to person, place, and time.   Psychiatric:         Behavior: Behavior normal.         Lab Results   Component Value Date     6.1 04/30/2024     Lab Results   Component Value Date    K 4.0 04/30/2024     04/30/2024    CO2 26 04/30/2024    BUN 16 04/30/2024    CREATININE 0.78 04/30/2024    GLUF 96 10/05/2023    CALCIUM 9.3 04/30/2024    CORRECTEDCA 10.0 06/02/2023    AST 21 04/30/2024    ALT 20 04/30/2024    ALKPHOS 71 04/30/2024    EGFR 98 04/30/2024     Lab Results   Component Value Date    WBC 5.26 04/30/2024    HGB 13.9 04/30/2024    HCT 42.0 04/30/2024    MCV 91 04/30/2024     04/30/2024     Lab Results   Component Value Date    NEUTROABS 2.35 04/30/2024        Trend:  Lab Results   Component Value Date     6.1 04/30/2024     8.4 01/22/2024     9.2 11/09/2023     8.8 10/19/2023     9.9 10/05/2023     10.4 09/14/2023     9.6 09/07/2023     28.3 08/17/2023     25.4 07/29/2023     214.4 (H) 06/02/2023

## 2024-05-07 ENCOUNTER — HOSPITAL ENCOUNTER (OUTPATIENT)
Dept: RADIOLOGY | Facility: CLINIC | Age: 34
Discharge: HOME | End: 2024-05-07
Payer: COMMERCIAL

## 2024-05-07 DIAGNOSIS — Z13.820 OSTEOPOROSIS SCREENING: ICD-10-CM

## 2024-05-07 PROCEDURE — 77080 DXA BONE DENSITY AXIAL: CPT

## 2024-05-08 NOTE — PROGRESS NOTES
"Denial received for Veozah, citing: \"patient must be menopausal and must have transaminases/bili collected and be < 2x ULN.\" Both apply to her. Spoke to clinical reviewer at [facility] and faxed additional documentation.  "

## 2024-05-15 ENCOUNTER — TELEPHONE (OUTPATIENT)
Dept: SURGICAL ONCOLOGY | Facility: CLINIC | Age: 34
End: 2024-05-15

## 2024-05-15 DIAGNOSIS — N95.1 SYMPTOMATIC MENOPAUSAL OR FEMALE CLIMACTERIC STATES: ICD-10-CM

## 2024-05-15 DIAGNOSIS — C56.9 MALIGNANT NEOPLASM OF OVARY, UNSPECIFIED LATERALITY (HCC): ICD-10-CM

## 2024-05-15 NOTE — TELEPHONE ENCOUNTER
Call placed to [employer] to f/u on PA.    Informed that Veozah is approved as of 5/8/24, auth #[id number].    Uguruhart message sent to patient and new rx sent to pharmacy with auth number included.

## 2024-05-20 ENCOUNTER — TELEPHONE (OUTPATIENT)
Dept: GYNECOLOGIC ONCOLOGY | Facility: CLINIC | Age: 34
End: 2024-05-20

## 2024-05-20 NOTE — TELEPHONE ENCOUNTER
Called to inform patient that per [place] her medication Veozah was approved threw insurance and a script was sent to [facility] in [place]. Instructed patient if she had any further questions to call the office and provided the office phone number.

## 2024-06-03 ENCOUNTER — HOSPITAL ENCOUNTER (OUTPATIENT)
Dept: RADIOLOGY | Facility: CLINIC | Age: 34
Discharge: HOME | End: 2024-06-03
Payer: COMMERCIAL

## 2024-06-03 ENCOUNTER — OFFICE VISIT (OUTPATIENT)
Dept: OBGYN CLINIC | Facility: CLINIC | Age: 34
End: 2024-06-03

## 2024-06-03 VITALS
BODY MASS INDEX: 29.51 KG/M2 | HEART RATE: 86 BPM | TEMPERATURE: 97.7 F | WEIGHT: 188 LBS | OXYGEN SATURATION: 99 % | SYSTOLIC BLOOD PRESSURE: 126 MMHG | DIASTOLIC BLOOD PRESSURE: 78 MMHG | HEIGHT: 67 IN

## 2024-06-03 DIAGNOSIS — S82.445D CLOSED NONDISPLACED SPIRAL FRACTURE OF SHAFT OF LEFT FIBULA WITH ROUTINE HEALING, SUBSEQUENT ENCOUNTER: Primary | ICD-10-CM

## 2024-06-03 DIAGNOSIS — S82.445D CLOSED NONDISPLACED SPIRAL FRACTURE OF SHAFT OF LEFT FIBULA WITH ROUTINE HEALING, SUBSEQUENT ENCOUNTER: ICD-10-CM

## 2024-06-03 PROCEDURE — 99024 POSTOP FOLLOW-UP VISIT: CPT | Performed by: ORTHOPAEDIC SURGERY

## 2024-06-03 PROCEDURE — 73610 X-RAY EXAM OF ANKLE: CPT

## 2024-06-03 NOTE — PATIENT INSTRUCTIONS
Patient will go into a high cam boot for another 4 weeks, then return for repeat x-rays.  May weight-bear as tolerated in the cam boot, with crutches if needed.  Can not weight-bear without the boot in place.  Cam boot should only be removed for bathing/cleansing.

## 2024-06-03 NOTE — PROGRESS NOTES
"Patient Name:  Jennifer Hale  MRN:  [id number]    Assessment     1. Closed nondisplaced spiral fracture of shaft of left fibula with routine healing, subsequent encounter  XR ankle 3+ vw left    Cam Boot        Plan     Patient will go into a high cam boot for another 4 weeks, then return for repeat x-rays.  May weight-bear as tolerated in the cam boot, with crutches if needed initially.  Can not weight-bear without the boot in place.  Cam boot should only be removed for bathing/cleansing.      Return in about 4 weeks (around 7/1/2024) for Recheck repeat x-rays right ankle, Recheck.    Subjective   Jennifer Hale returns for follow-up of left closed nondisplaced distal fibular shaft spiral fracture, DOI 4/21/2024 secondary to roller derby injury.  The patient is 6 week(s) post injury and returns for routine follow-up cast off and x-ray. Patient denies complaints of pain numbness or tingling at rest.  She does note that approximately 2 weeks ago she did have a little incident where she was reaching for something and fell forward and rolled over her toes and put some pressure on the foot.  She notes that she had some increased discomfort near the fracture site that got better within a few days.    Objective     /78   Pulse 86   Temp 97.7 °F (36.5 °C) (Temporal)   Ht 5' 7\" (1.702 m)   Wt 85.3 kg (188 lb)   LMP 05/15/2023 (Exact Date)   SpO2 99%   BMI 29.44 kg/m²     Left ankle is a well-fitting short leg cast in place that was removed by MA in the office.  Muscle and leg no atrophy.  Still faint ecchymosis posterior aspect and both medial and lateral ankle areas.  Light touch sensation is intact.  There is no gross long bone deformity.  Calf is without pain or palpable cords.  No signs of DVT.  There is limited range of motion of the ankle due to stiffness from immobilization.      Data Review     I have personally reviewed pertinent films in PACS documenting only 2 of 4 cortices showing healing " fracture line  visible yet on lateral view.  X rays reviewed with Dr Drew.    Harsha Pineda PA-C

## 2024-07-01 ENCOUNTER — HOSPITAL ENCOUNTER (OUTPATIENT)
Dept: RADIOLOGY | Facility: CLINIC | Age: 34
Discharge: HOME | End: 2024-07-01
Payer: COMMERCIAL

## 2024-07-01 ENCOUNTER — OFFICE VISIT (OUTPATIENT)
Dept: OBGYN CLINIC | Facility: CLINIC | Age: 34
End: 2024-07-01

## 2024-07-01 VITALS
TEMPERATURE: 97.9 F | HEART RATE: 79 BPM | HEIGHT: 67 IN | OXYGEN SATURATION: 99 % | DIASTOLIC BLOOD PRESSURE: 80 MMHG | WEIGHT: 188 LBS | BODY MASS INDEX: 29.51 KG/M2 | SYSTOLIC BLOOD PRESSURE: 120 MMHG

## 2024-07-01 DIAGNOSIS — S82.445D CLOSED NONDISPLACED SPIRAL FRACTURE OF SHAFT OF LEFT FIBULA WITH ROUTINE HEALING, SUBSEQUENT ENCOUNTER: Primary | ICD-10-CM

## 2024-07-01 DIAGNOSIS — S82.445D CLOSED NONDISPLACED SPIRAL FRACTURE OF SHAFT OF LEFT FIBULA WITH ROUTINE HEALING, SUBSEQUENT ENCOUNTER: ICD-10-CM

## 2024-07-01 PROCEDURE — 99024 POSTOP FOLLOW-UP VISIT: CPT | Performed by: ORTHOPAEDIC SURGERY

## 2024-07-01 PROCEDURE — 73610 X-RAY EXAM OF ANKLE: CPT

## 2024-07-01 NOTE — PROGRESS NOTES
"Patient Name:  Jennifer Hale  MRN:  [id number]    Assessment     1. Closed nondisplaced spiral fracture of shaft of left fibula with routine healing, subsequent encounter  XR ankle 3+ vw left          Plan     I would recommend follow-up in 3 weeks.  She is permitted to bear weight on the left lower extremity as tolerated wearing the cam boot when she is more active but may remove the boot for sleeping, showering and bathing and for brief periods of ambulation.  X-rays will be obtained at follow-up in 3 weeks including AP, lateral and mortise views of the ankle with consideration for use of a bone stim device if the fracture does not seem to be adequately progressing and healing.    Return in about 3 weeks (around 7/22/2024).      Subjective   Jennifer Hale returns for follow-up of her distal fibular fracture.  The patient is 2 1/2 month(s) post injury and returns for routine follow-up. Patient complains of some mild discomfort with pressure placed over the fracture site.  She has begun to ambulate with the cam boot in place about 2 weeks ago and has shifted to using just a single crutch in the last week.  She still does not stand and bear weight while showering or bathing.      Objective     /80 (BP Location: Left arm)   Pulse 79   Temp 97.9 °F (36.6 °C)   Ht 5' 7\" (1.702 m)   Wt 85.3 kg (188 lb)   LMP 05/15/2023 (Exact Date)   SpO2 99%   BMI 29.44 kg/m²     The left leg exam demonstrates tenderness at the fracture site.  Skin demonstrates no irritation and there is no swelling noted.  She does have slight tenderness with inversion stress and plantarflexion localized to the anterior and lateral ankle.  The distal tibia is nontender.  There is no complaints with inversion stress in neutral position or with eversion stress.      Data Review     I have personally reviewed pertinent films in PACS demonstrating the fracture to remain acceptably aligned with some evidence of progressive " healing.    Andre Drew

## 2024-07-01 NOTE — LETTER
July 1, 2024     Patient: Jennifer Hale  YOB: 1990  Date of Visit: 7/1/2024      To Whom it May Concern:    Jennifer Hale is under my professional care. Jennifer was seen in my office on 7/1/2024. Jennifer may return to work on 7/1/2024.  She must be permitted to wear her cam boot while working and should be working and primarily a sedentary capacity.  She is permitted to bear weight on the left lower extremity and does not need to use crutches or assistive devices.  She is permitted to ambulate as needed in the office .    If you have any questions or concerns, please don't hesitate to call.         Sincerely,          Andre Drew        CC: No Recipients

## 2024-08-05 ENCOUNTER — APPOINTMENT (OUTPATIENT)
Dept: LAB | Facility: CLINIC | Age: 34
End: 2024-08-05
Payer: COMMERCIAL

## 2024-08-05 DIAGNOSIS — N95.1 SYMPTOMATIC MENOPAUSAL OR FEMALE CLIMACTERIC STATES: ICD-10-CM

## 2024-08-05 DIAGNOSIS — C56.9 MALIGNANT NEOPLASM OF OVARY, UNSPECIFIED LATERALITY (HCC): ICD-10-CM

## 2024-08-05 LAB
AFP-TM SERPL-MCNC: 5.13 NG/ML (ref 0–9)
CANCER AG125 SERPL-ACNC: 6.2 U/ML (ref 0–35)

## 2024-08-05 PROCEDURE — 82105 ALPHA-FETOPROTEIN SERUM: CPT

## 2024-08-05 PROCEDURE — 86304 IMMUNOASSAY TUMOR CA 125: CPT

## 2024-08-05 PROCEDURE — 80053 COMPREHEN METABOLIC PANEL: CPT

## 2024-08-05 PROCEDURE — 36415 COLL VENOUS BLD VENIPUNCTURE: CPT

## 2024-08-06 LAB
ALBUMIN SERPL BCG-MCNC: 4.5 G/DL (ref 3.5–5)
ALP SERPL-CCNC: 85 U/L (ref 34–104)
ALT SERPL W P-5'-P-CCNC: 19 U/L (ref 7–52)
ANION GAP SERPL CALCULATED.3IONS-SCNC: 15 MMOL/L (ref 4–13)
AST SERPL W P-5'-P-CCNC: 18 U/L (ref 13–39)
BILIRUB SERPL-MCNC: 0.49 MG/DL (ref 0.2–1)
BUN SERPL-MCNC: 13 MG/DL (ref 5–25)
CALCIUM SERPL-MCNC: 9.7 MG/DL (ref 8.4–10.2)
CHLORIDE SERPL-SCNC: 102 MMOL/L (ref 96–108)
CO2 SERPL-SCNC: 23 MMOL/L (ref 21–32)
CREAT SERPL-MCNC: 0.87 MG/DL (ref 0.6–1.3)
GFR SERPL CREATININE-BSD FRML MDRD: 86 ML/MIN/1.73SQ M
GLUCOSE SERPL-MCNC: 92 MG/DL (ref 65–140)
POTASSIUM SERPL-SCNC: 3.6 MMOL/L (ref 3.5–5.3)
PROT SERPL-MCNC: 7.5 G/DL (ref 6.4–8.4)
SODIUM SERPL-SCNC: 140 MMOL/L (ref 135–147)

## 2024-08-12 ENCOUNTER — OFFICE VISIT (OUTPATIENT)
Dept: GYNECOLOGIC ONCOLOGY | Facility: CLINIC | Age: 34
End: 2024-08-12
Payer: COMMERCIAL

## 2024-08-12 VITALS
HEART RATE: 83 BPM | BODY MASS INDEX: 30.23 KG/M2 | DIASTOLIC BLOOD PRESSURE: 82 MMHG | HEIGHT: 67 IN | TEMPERATURE: 98.1 F | SYSTOLIC BLOOD PRESSURE: 122 MMHG | WEIGHT: 192.6 LBS | RESPIRATION RATE: 18 BRPM | OXYGEN SATURATION: 98 %

## 2024-08-12 DIAGNOSIS — C78.89 SECONDARY MALIGNANT NEOPLASM OF OTHER DIGESTIVE ORGANS (HCC): ICD-10-CM

## 2024-08-12 DIAGNOSIS — C56.9 MALIGNANT NEOPLASM OF OVARY, UNSPECIFIED LATERALITY (HCC): Primary | ICD-10-CM

## 2024-08-12 PROCEDURE — 99214 OFFICE O/P EST MOD 30 MIN: CPT | Performed by: OBSTETRICS & GYNECOLOGY

## 2024-08-12 NOTE — ASSESSMENT & PLAN NOTE
Patient is a very pleasant 33-year-old female with a history of stage IIb concurrent ovarian endometrioid and yolk sac carcinomas.  The patient has completed therapy she is in a clinical remission by exam and blood work.  Her menopausal symptoms are controlled with Veozah.    Patient will follow-up in 3 months for repeat blood work and exam.

## 2024-08-12 NOTE — PROGRESS NOTES
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  The patient has completed therapy she is in a clinical remission by exam and blood work.  Her menopausal symptoms are controlled with Veozah.    Patient will follow-up in 3 months for repeat blood work and exam.         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  Intra-Op findings revealed a large left adnexal mass approximately 12 to 14 cm densely adherent to the sigmoid colon.  It ruptured releasing chocolate fluid into the abdomen however prior staining of the omentum indicated likely previous rupture.    Final pathology report after extensive review by [facility] was most consistent with endometrioid adenocarcinoma initiating within endometriosis within the ovary.  Additionally yolk sac tumor was identified which extended to the local:.  Making this a stage IIB tumor.  Would recommend 4 cycles of bleomycin etoposide and cis-platinum.     7/11/2023 Initial Diagnosis    Malignant neoplasm of left ovary (HCC)     7/11/2023 -  Cancer Staged    Staging form: Ovary, Fallopian Tube, Primary Peritoneal, AJCC 8th Edition  - Clinical stage from 7/11/2023: Stage IIB (cT2b, cN0, cM0) - Signed by Omer Monique MD on 7/11/2023  Histopathologic  type: Yolk sac tumor  Stage prefix: Initial diagnosis       7/31/2023 - 10/23/2023 Chemotherapy    alteplase (CATHFLO), 2 mg, Intracatheter, Every 1 Minute as needed, 4 of 4 cycles  pegfilgrastim (NEULASTA ONPRO), 6 mg, Subcutaneous, Once, 4 of 4 cycles  Administration: 6 mg (8/7/2023), 6 mg (8/28/2023), 6 mg (9/25/2023), 6 mg (10/16/2023)  bleomycin (BLENOXANE) IVPB, 30 Units, Intravenous, Once, 4 of 4 cycles  Administration: 30 Units (7/31/2023), 30 Units (8/7/2023), 30 Units (8/14/2023), 30 Units (8/21/2023), 30 Units (8/28/2023), 30 Units (9/5/2023), 30 Units (9/25/2023), 30 Units (10/2/2023), 30 Units (10/16/2023), 30 Units (10/23/2023), 30 Units (9/18/2023), 30 Units (10/9/2023)  CISplatin (PLATINOL) infusion, 20 mg/m2 = 38.8 mg, Intravenous, Once, 4 of 4 cycles  Administration: 38.8 mg (7/31/2023), 38.8 mg (8/1/2023), 38.8 mg (8/21/2023), 38.8 mg (8/2/2023), 38.8 mg (8/3/2023), 38.8 mg (8/4/2023), 38.8 mg (8/22/2023), 38.8 mg (8/23/2023), 38.8 mg (8/24/2023), 38.8 mg (8/25/2023), 39 mg (9/18/2023), 39 mg (9/19/2023), 39 mg (9/20/2023), 39 mg (9/21/2023), 39 mg (9/22/2023), 39 mg (10/9/2023), 39 mg (10/10/2023), 39 mg (10/11/2023), 39 mg (10/12/2023), 39 mg (10/13/2023)  fosaprepitant (EMEND) IVPB, 150 mg, Intravenous, Once, 4 of 4 cycles  Administration: 150 mg (7/31/2023), 150 mg (8/21/2023), 150 mg (9/18/2023), 150 mg (10/9/2023), 150 mg (10/13/2023)  etoposide (TOPOSAR), 100 mg/m2 = 194 mg, Intravenous, Once, 4 of 4 cycles  Administration: 194 mg (7/31/2023), 194 mg (8/1/2023), 194 mg (8/21/2023), 194 mg (8/2/2023), 194 mg (8/3/2023), 194 mg (8/4/2023), 194 mg (8/22/2023), 194 mg (8/23/2023), 194 mg (8/24/2023), 194 mg (8/25/2023), 200 mg (9/18/2023), 200 mg (9/19/2023), 200 mg (9/20/2023), 200 mg (9/21/2023), 200 mg (9/22/2023), 200 mg (10/9/2023), 200 mg (10/10/2023), 200 mg (10/11/2023), 200 mg (10/12/2023), 200 mg (10/13/2023)           Patient ID: Jennifer Hale is a 33 y.o. 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  Surgery was in June 2023.  Upon completion of treatment patient has remained in clinical remission.  Most recently her  is normal at 6.2.  Her alpha-fetoprotein is normal at 5.13.  A DEXA scan was performed which reveals normal bone density.    Today, the patient is doing well.  She denies significant abdominal pain, pelvic pain, nausea, vomiting, constipation, diarrhea, fevers, chills, or vaginal bleeding.           The following portions of the patient's history were reviewed and updated as appropriate: allergies, current medications, past family history, past medical history, past social history, past surgical history, and problem list.    Review of Systems   Constitutional: Negative.    HENT: Negative.     Eyes: Negative.    Respiratory: Negative.     Cardiovascular: Negative.    Gastrointestinal: Negative.    Endocrine: Negative.    Genitourinary: Negative.    Musculoskeletal: Negative.    Skin: Negative.    Neurological: Negative.    Hematological: Negative.    Psychiatric/Behavioral: Negative.         Current Outpatient Medications   Medication Sig Dispense Refill    cholecalciferol (VITAMIN D3) 1,000 units tablet Take 5,000 Units by mouth daily      Multiple Vitamin (multivitamin) tablet Take 1 tablet by mouth daily      CRANIAL PROSTHESIS, RX, Apply for drug-induced alopecia (Patient not taking: Reported on 1/30/2024) 1 each 0    fezolinetant (Veozah) tablet Take 1 tablet (45 mg total) by mouth daily (Patient not taking: Reported on 8/12/2024) 30 tablet 2    PARoxetine (PAXIL) 10 mg tablet Take 1 tablet (10 mg total) by mouth daily (Patient not taking: Reported on 8/12/2024) 30 tablet 5     No current facility-administered medications for this visit.           Objective:    Blood pressure 122/82,  "pulse 83, temperature 98.1 °F (36.7 °C), resp. rate 18, height 5' 7\" (1.702 m), weight 87.4 kg (192 lb 9.6 oz), last menstrual period 05/15/2023, SpO2 98%.  Body mass index is 30.17 kg/m².  Body surface area is 1.99 meters squared.    Physical Exam  Constitutional:       Appearance: She is well-developed.   HENT:      Head: Normocephalic and atraumatic.   Neck:      Thyroid: No thyromegaly.   Cardiovascular:      Rate and Rhythm: Normal rate and regular rhythm.      Heart sounds: Normal heart sounds.   Pulmonary:      Effort: Pulmonary effort is normal.      Breath sounds: Normal breath sounds.   Abdominal:      General: Bowel sounds are normal.      Palpations: Abdomen is soft.      Comments: Well healed laparoscopic incisions.   Genitourinary:     Comments: -Normal external female genitalia, normal Bartholin's and Collbran's glands                  -Normal midline urethral meatus. No lesions notes                  -Bladder without fullness mass or tenderness                  -Vagina without lesion or discharge No significant cystocele or rectocele noted                  -Cervix surgically absent                  -Uterus surgically absent                  -Adnexae surgically absent                  - Anus without fissure of lesion    Musculoskeletal:         General: Normal range of motion.      Cervical back: Normal range of motion and neck supple.   Lymphadenopathy:      Cervical: No cervical adenopathy.   Skin:     General: Skin is warm and dry.   Neurological:      Mental Status: She is alert and oriented to person, place, and time.   Psychiatric:         Behavior: Behavior normal.         Lab Results   Component Value Date     6.2 08/05/2024     Lab Results   Component Value Date    K 3.6 08/05/2024     08/05/2024    CO2 23 08/05/2024    BUN 13 08/05/2024    CREATININE 0.87 08/05/2024    GLUF 96 10/05/2023    CALCIUM 9.7 08/05/2024    CORRECTEDCA 10.0 06/02/2023    AST 18 08/05/2024    ALT 19 " 08/05/2024    ALKPHOS 85 08/05/2024    EGFR 86 08/05/2024     Lab Results   Component Value Date    WBC 5.26 04/30/2024    HGB 13.9 04/30/2024    HCT 42.0 04/30/2024    MCV 91 04/30/2024     04/30/2024     Lab Results   Component Value Date    NEUTROABS 2.35 04/30/2024        Trend:  Lab Results   Component Value Date     6.2 08/05/2024     6.1 04/30/2024     8.4 01/22/2024     9.2 11/09/2023     8.8 10/19/2023     9.9 10/05/2023     10.4 09/14/2023     9.6 09/07/2023     28.3 08/17/2023     25.4 07/29/2023     214.4 (H) 06/02/2023

## 2024-10-22 NOTE — ASSESSMENT & PLAN NOTE
Patient is a very pleasant 25-year-old female with a history of ovarian mass. She underwent staging procedure for likely endometrioid malignancy within endometriosis however final report is pending. We discussed the patient that she would likely need chemotherapy due to extension to nearby: As evidenced by biopsy. We have also discussed that she would likely need genetics consult however final pathology report remains necessary to put a final plan together. We will see the patient back in 2 weeks to discuss upcoming plans and final pathology report. PAST MEDICAL HISTORY:  Atrial fibrillation     BPH (benign prostatic hyperplasia)     CHF, chronic LVEF 65 PASP 59 midl MR, AR on 12/2/21    CKD (chronic kidney disease)     COPD, mild     ESRD (end stage renal disease) dialysis [facility]    Gout     H/O pulmonary hypertension     HTN (hypertension)     Lymphoma t cell; remission since 11/2020    Mitral regurgitation     ERIK (obstructive sleep apnea)

## 2024-11-07 ENCOUNTER — APPOINTMENT (OUTPATIENT)
Dept: LAB | Facility: CLINIC | Age: 34
End: 2024-11-07

## 2024-11-07 DIAGNOSIS — C56.9 MALIGNANT NEOPLASM OF OVARY, UNSPECIFIED LATERALITY (HCC): ICD-10-CM

## 2024-11-07 LAB
AFP-TM SERPL-MCNC: 7.01 NG/ML (ref 0–9)
CANCER AG125 SERPL-ACNC: 7.2 U/ML (ref 0–35)

## 2024-11-07 PROCEDURE — 36415 COLL VENOUS BLD VENIPUNCTURE: CPT

## 2024-11-07 PROCEDURE — 82105 ALPHA-FETOPROTEIN SERUM: CPT

## 2024-11-07 PROCEDURE — 86304 IMMUNOASSAY TUMOR CA 125: CPT

## 2024-11-18 ENCOUNTER — OFFICE VISIT (OUTPATIENT)
Dept: GYNECOLOGIC ONCOLOGY | Facility: CLINIC | Age: 34
End: 2024-11-18

## 2024-11-18 VITALS
BODY MASS INDEX: 30.42 KG/M2 | HEIGHT: 67 IN | SYSTOLIC BLOOD PRESSURE: 126 MMHG | TEMPERATURE: 97.7 F | OXYGEN SATURATION: 98 % | DIASTOLIC BLOOD PRESSURE: 86 MMHG | RESPIRATION RATE: 16 BRPM | WEIGHT: 193.8 LBS | HEART RATE: 73 BPM

## 2024-11-18 DIAGNOSIS — C56.9 MALIGNANT NEOPLASM OF OVARY, UNSPECIFIED LATERALITY (HCC): Primary | ICD-10-CM

## 2024-11-18 PROCEDURE — 99213 OFFICE O/P EST LOW 20 MIN: CPT | Performed by: OBSTETRICS & GYNECOLOGY

## 2024-11-18 NOTE — ASSESSMENT & PLAN NOTE
Patient is a very pleasant 33-year-old female with history of stage IIb yolk sac and endometrioid ovarian adenocarcinoma status post OCHOA/BSO staging followed by 4 cycles of bleomycin etoposide cis-platinum.  The patient has remained disease-free since that time.  She remains in a clinical remission now.  She will follow-up in 3 months with repeat alpha-fetoprotein and .

## 2024-11-18 NOTE — PROGRESS NOTES
Assessment/Plan:    Problem List Items Addressed This Visit       Malignant neoplasm of ovary (HCC) - Primary    Patient is a very pleasant 33-year-old female with history of stage IIb yolk sac and endometrioid ovarian adenocarcinoma status post OCHOA/BSO staging followed by 4 cycles of bleomycin etoposide cis-platinum.  The patient has remained disease-free since that time.  She remains in a clinical remission now.  She will follow-up in 3 months with repeat alpha-fetoprotein and .         Relevant Orders    AFP tumor marker         CHIEF COMPLAINT: Surveillance ovarian cancer      Problem:  Cancer Staging   Malignant neoplasm of ovary (HCC)  Staging form: Ovary, Fallopian Tube, Primary Peritoneal, AJCC 8th Edition  - Clinical stage from 7/11/2023: Stage IIB (cT2b, cN0, cM0) - Signed by Omer Monique MD on 7/11/2023        Previous therapy:  Oncology History   Malignant neoplasm of ovary (HCC)   6/13/2023 Surgery    Patient underwent exploratory laparotomy OCHOA/BSO and staging procedure.  Intra-Op findings revealed a large left adnexal mass approximately 12 to 14 cm densely adherent to the sigmoid colon.  It ruptured releasing chocolate fluid into the abdomen however prior staining of the omentum indicated likely previous rupture.    Final pathology report after extensive review by [facility] was most consistent with endometrioid adenocarcinoma initiating within endometriosis within the ovary.  Additionally yolk sac tumor was identified which extended to the local:.  Making this a stage IIB tumor.  Would recommend 4 cycles of bleomycin etoposide and cis-platinum.     7/11/2023 Initial Diagnosis    Malignant neoplasm of left ovary (HCC)     7/11/2023 -  Cancer Staged    Staging form: Ovary, Fallopian Tube, Primary Peritoneal, AJCC 8th Edition  - Clinical stage from 7/11/2023: Stage IIB (cT2b, cN0, cM0) - Signed by Omer Monique MD on 7/11/2023  Histopathologic type: Yolk sac tumor  Stage prefix: Initial  diagnosis       7/31/2023 - 10/23/2023 Chemotherapy    alteplase (CATHFLO), 2 mg, Intracatheter, Every 1 Minute as needed, 4 of 4 cycles  pegfilgrastim (NEULASTA ONPRO), 6 mg, Subcutaneous, Once, 4 of 4 cycles  Administration: 6 mg (8/7/2023), 6 mg (8/28/2023), 6 mg (9/25/2023), 6 mg (10/16/2023)  bleomycin (BLENOXANE) IVPB, 30 Units, Intravenous, Once, 4 of 4 cycles  Administration: 30 Units (7/31/2023), 30 Units (8/7/2023), 30 Units (8/14/2023), 30 Units (8/21/2023), 30 Units (8/28/2023), 30 Units (9/5/2023), 30 Units (9/25/2023), 30 Units (10/2/2023), 30 Units (10/16/2023), 30 Units (10/23/2023), 30 Units (9/18/2023), 30 Units (10/9/2023)  CISplatin (PLATINOL) infusion, 20 mg/m2 = 38.8 mg, Intravenous, Once, 4 of 4 cycles  Administration: 38.8 mg (7/31/2023), 38.8 mg (8/1/2023), 38.8 mg (8/21/2023), 38.8 mg (8/2/2023), 38.8 mg (8/3/2023), 38.8 mg (8/4/2023), 38.8 mg (8/22/2023), 38.8 mg (8/23/2023), 38.8 mg (8/24/2023), 38.8 mg (8/25/2023), 39 mg (9/18/2023), 39 mg (9/19/2023), 39 mg (9/20/2023), 39 mg (9/21/2023), 39 mg (9/22/2023), 39 mg (10/9/2023), 39 mg (10/10/2023), 39 mg (10/11/2023), 39 mg (10/12/2023), 39 mg (10/13/2023)  fosaprepitant (EMEND) IVPB, 150 mg, Intravenous, Once, 4 of 4 cycles  Administration: 150 mg (7/31/2023), 150 mg (8/21/2023), 150 mg (9/18/2023), 150 mg (10/9/2023), 150 mg (10/13/2023)  etoposide (TOPOSAR), 100 mg/m2 = 194 mg, Intravenous, Once, 4 of 4 cycles  Administration: 194 mg (7/31/2023), 194 mg (8/1/2023), 194 mg (8/21/2023), 194 mg (8/2/2023), 194 mg (8/3/2023), 194 mg (8/4/2023), 194 mg (8/22/2023), 194 mg (8/23/2023), 194 mg (8/24/2023), 194 mg (8/25/2023), 200 mg (9/18/2023), 200 mg (9/19/2023), 200 mg (9/20/2023), 200 mg (9/21/2023), 200 mg (9/22/2023), 200 mg (10/9/2023), 200 mg (10/10/2023), 200 mg (10/11/2023), 200 mg (10/12/2023), 200 mg (10/13/2023)           Patient ID: Jennifer TORITO Hale is a 33 y.o. female  Patient is a very pleasant 33-year-old female with a history  of stage to be yolk sac tumor concurrent with endometrioid adenocarcinoma of the ovary.  Patient underwent 4 cycles of bleomycin etoposide and cisplatin on.  Original diagnosis was in June 2023 at which time the patient underwent a OCHOA/BSO and staging.    Since that time the patient has been without evidence of recurrence of disease.  She reports today.  She is without complaint.Today, the patient is doing well.  She denies significant abdominal pain, pelvic pain, nausea, vomiting, constipation, diarrhea, fevers, chills, or vaginal bleeding.     Most recent alpha-fetoprotein and  are stable and normal        The following portions of the patient's history were reviewed and updated as appropriate: allergies, current medications, past family history, past medical history, past social history, past surgical history, and problem list.    Review of Systems   Constitutional: Negative.    HENT: Negative.     Eyes: Negative.    Respiratory: Negative.     Cardiovascular: Negative.    Gastrointestinal: Negative.    Endocrine: Negative.    Genitourinary: Negative.    Musculoskeletal: Negative.    Skin: Negative.    Neurological: Negative.    Hematological: Negative.    Psychiatric/Behavioral: Negative.         Current Outpatient Medications   Medication Sig Dispense Refill    cholecalciferol (VITAMIN D3) 1,000 units tablet Take 5,000 Units by mouth daily      Multiple Vitamin (multivitamin) tablet Take 1 tablet by mouth daily      CRANIAL PROSTHESIS, RX, Apply for drug-induced alopecia (Patient not taking: Reported on 1/30/2024) 1 each 0    fezolinetant (Veozah) tablet Take 1 tablet (45 mg total) by mouth daily (Patient not taking: Reported on 8/12/2024) 30 tablet 2    PARoxetine (PAXIL) 10 mg tablet Take 1 tablet (10 mg total) by mouth daily (Patient not taking: Reported on 8/12/2024) 30 tablet 5     No current facility-administered medications for this visit.           Objective:    Blood pressure 126/86, pulse 73,  "temperature 97.7 °F (36.5 °C), temperature source Temporal, resp. rate 16, height 5' 7\" (1.702 m), weight 87.9 kg (193 lb 12.8 oz), last menstrual period 05/15/2023, SpO2 98%.  Body mass index is 30.35 kg/m².  Body surface area is 2 meters squared.    Physical Exam    Lab Results   Component Value Date     7.2 11/07/2024     Lab Results   Component Value Date    K 4.2 11/07/2024     11/07/2024    CO2 29 11/07/2024    BUN 16 11/07/2024    CREATININE 0.82 11/07/2024    GLUF 96 10/05/2023    CALCIUM 9.4 11/07/2024    CORRECTEDCA 10.0 06/02/2023    AST 21 11/07/2024    ALT 19 11/07/2024    ALKPHOS 103 11/07/2024    EGFR 93 11/07/2024     Lab Results   Component Value Date    WBC 6.30 11/07/2024    HGB 15.4 11/07/2024    HCT 46.9 (H) 11/07/2024    MCV 96 11/07/2024     11/07/2024     Lab Results   Component Value Date    NEUTROABS 3.27 11/07/2024        Trend:  Lab Results   Component Value Date     7.2 11/07/2024     6.2 08/05/2024     6.1 04/30/2024     8.4 01/22/2024     9.2 11/09/2023     8.8 10/19/2023     9.9 10/05/2023     10.4 09/14/2023     9.6 09/07/2023     28.3 08/17/2023     25.4 07/29/2023     214.4 (H) 06/02/2023                 "

## 2025-01-09 PROBLEM — R77.2 ELEVATED AFP: Status: ACTIVE | Noted: 2025-01-09

## 2025-02-02 ENCOUNTER — APPOINTMENT (EMERGENCY)
Dept: RADIOLOGY | Facility: HOSPITAL | Age: 35
End: 2025-02-02
Payer: COMMERCIAL

## 2025-02-02 ENCOUNTER — APPOINTMENT (EMERGENCY)
Dept: CT IMAGING | Facility: HOSPITAL | Age: 35
End: 2025-02-02
Payer: COMMERCIAL

## 2025-02-02 ENCOUNTER — HOSPITAL ENCOUNTER (EMERGENCY)
Facility: HOSPITAL | Age: 35
Discharge: HOME | End: 2025-02-02
Attending: STUDENT IN AN ORGANIZED HEALTH CARE EDUCATION/TRAINING PROGRAM
Payer: COMMERCIAL

## 2025-02-02 VITALS
HEART RATE: 79 BPM | OXYGEN SATURATION: 97 % | WEIGHT: 181.66 LBS | BODY MASS INDEX: 28.45 KG/M2 | SYSTOLIC BLOOD PRESSURE: 149 MMHG | RESPIRATION RATE: 16 BRPM | DIASTOLIC BLOOD PRESSURE: 81 MMHG | TEMPERATURE: 99.1 F

## 2025-02-02 DIAGNOSIS — M79.645 THUMB PAIN, LEFT: ICD-10-CM

## 2025-02-02 DIAGNOSIS — S00.12XA PERIORBITAL ECCHYMOSIS OF LEFT EYE, INITIAL ENCOUNTER: ICD-10-CM

## 2025-02-02 DIAGNOSIS — S82.62XA CLOSED FRACTURE OF DISTAL LATERAL MALLEOLUS OF LEFT FIBULA, INITIAL ENCOUNTER: Primary | ICD-10-CM

## 2025-02-02 DIAGNOSIS — R22.0 NASAL SWELLING: ICD-10-CM

## 2025-02-02 DIAGNOSIS — M79.641 RIGHT HAND PAIN: ICD-10-CM

## 2025-02-02 DIAGNOSIS — S80.01XA TRAUMATIC ECCHYMOSIS OF RIGHT KNEE, INITIAL ENCOUNTER: ICD-10-CM
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
## 2025-02-02 LAB
ALBUMIN SERPL BCG-MCNC: 4.9 G/DL (ref 3.5–5)
ALP SERPL-CCNC: 79 U/L (ref 34–104)
ALT SERPL W P-5'-P-CCNC: 25 U/L (ref 7–52)
ANION GAP SERPL CALCULATED.3IONS-SCNC: 7 MMOL/L (ref 4–13)
AST SERPL W P-5'-P-CCNC: 35 U/L (ref 13–39)
BASOPHILS # BLD AUTO: 0.04 THOUSANDS/ΜL (ref 0–0.1)
BASOPHILS NFR BLD AUTO: 0 % (ref 0–1)
BILIRUB SERPL-MCNC: 0.56 MG/DL (ref 0.2–1)
BUN SERPL-MCNC: 11 MG/DL (ref 5–25)
CALCIUM SERPL-MCNC: 9.5 MG/DL (ref 8.4–10.2)
CHLORIDE SERPL-SCNC: 105 MMOL/L (ref 96–108)
CK SERPL-CCNC: 418 U/L (ref 26–192)
CO2 SERPL-SCNC: 26 MMOL/L (ref 21–32)
CREAT SERPL-MCNC: 0.71 MG/DL (ref 0.6–1.3)
EOSINOPHIL # BLD AUTO: 0.05 THOUSAND/ΜL (ref 0–0.61)
EOSINOPHIL NFR BLD AUTO: 1 % (ref 0–6)
ERYTHROCYTE [DISTWIDTH] IN BLOOD BY AUTOMATED COUNT: 13.2 % (ref 11.6–15.1)
GFR SERPL CREATININE-BSD FRML MDRD: 109 ML/MIN/1.73SQ M
GLUCOSE SERPL-MCNC: 89 MG/DL (ref 65–140)
HCT VFR BLD AUTO: 44.6 % (ref 34.8–46.1)
HGB BLD-MCNC: 15.3 G/DL (ref 11.5–15.4)
IMM GRANULOCYTES # BLD AUTO: 0.03 THOUSAND/UL (ref 0–0.2)
IMM GRANULOCYTES NFR BLD AUTO: 0 % (ref 0–2)
LYMPHOCYTES # BLD AUTO: 2.41 THOUSANDS/ΜL (ref 0.6–4.47)
LYMPHOCYTES NFR BLD AUTO: 23 % (ref 14–44)
MCH RBC QN AUTO: 31.2 PG (ref 26.8–34.3)
MCHC RBC AUTO-ENTMCNC: 34.3 G/DL (ref 31.4–37.4)
MCV RBC AUTO: 91 FL (ref 82–98)
MONOCYTES # BLD AUTO: 0.96 THOUSAND/ΜL (ref 0.17–1.22)
MONOCYTES NFR BLD AUTO: 9 % (ref 4–12)
NEUTROPHILS # BLD AUTO: 7 THOUSANDS/ΜL (ref 1.85–7.62)
NEUTS SEG NFR BLD AUTO: 67 % (ref 43–75)
NRBC BLD AUTO-RTO: 0 /100 WBCS
PLATELET # BLD AUTO: 226 THOUSANDS/UL (ref 149–390)
PMV BLD AUTO: 10.5 FL (ref 8.9–12.7)
POTASSIUM SERPL-SCNC: 3.7 MMOL/L (ref 3.5–5.3)
PROT SERPL-MCNC: 7.6 G/DL (ref 6.4–8.4)
RBC # BLD AUTO: 4.91 MILLION/UL (ref 3.81–5.12)
SODIUM SERPL-SCNC: 138 MMOL/L (ref 135–147)
WBC # BLD AUTO: 10.49 THOUSAND/UL (ref 4.31–10.16)

## 2025-02-02 PROCEDURE — 93308 TTE F-UP OR LMTD: CPT | Performed by: STUDENT IN AN ORGANIZED HEALTH CARE EDUCATION/TRAINING PROGRAM

## 2025-02-02 PROCEDURE — 96375 TX/PRO/DX INJ NEW DRUG ADDON: CPT

## 2025-02-02 PROCEDURE — 73140 X-RAY EXAM OF FINGER(S): CPT

## 2025-02-02 PROCEDURE — 76705 ECHO EXAM OF ABDOMEN: CPT | Performed by: STUDENT IN AN ORGANIZED HEALTH CARE EDUCATION/TRAINING PROGRAM

## 2025-02-02 PROCEDURE — 99285 EMERGENCY DEPT VISIT HI MDM: CPT | Performed by: STUDENT IN AN ORGANIZED HEALTH CARE EDUCATION/TRAINING PROGRAM

## 2025-02-02 PROCEDURE — 85025 COMPLETE CBC W/AUTO DIFF WBC: CPT | Performed by: STUDENT IN AN ORGANIZED HEALTH CARE EDUCATION/TRAINING PROGRAM

## 2025-02-02 PROCEDURE — 29515 APPLICATION SHORT LEG SPLINT: CPT | Performed by: STUDENT IN AN ORGANIZED HEALTH CARE EDUCATION/TRAINING PROGRAM

## 2025-02-02 PROCEDURE — 72125 CT NECK SPINE W/O DYE: CPT

## 2025-02-02 PROCEDURE — 80053 COMPREHEN METABOLIC PANEL: CPT | Performed by: STUDENT IN AN ORGANIZED HEALTH CARE EDUCATION/TRAINING PROGRAM

## 2025-02-02 PROCEDURE — 73610 X-RAY EXAM OF ANKLE: CPT

## 2025-02-02 PROCEDURE — 99284 EMERGENCY DEPT VISIT MOD MDM: CPT

## 2025-02-02 PROCEDURE — 36415 COLL VENOUS BLD VENIPUNCTURE: CPT | Performed by: STUDENT IN AN ORGANIZED HEALTH CARE EDUCATION/TRAINING PROGRAM

## 2025-02-02 PROCEDURE — 82550 ASSAY OF CK (CPK): CPT | Performed by: STUDENT IN AN ORGANIZED HEALTH CARE EDUCATION/TRAINING PROGRAM

## 2025-02-02 PROCEDURE — 96374 THER/PROPH/DIAG INJ IV PUSH: CPT

## 2025-02-02 PROCEDURE — 96361 HYDRATE IV INFUSION ADD-ON: CPT

## 2025-02-02 PROCEDURE — 73564 X-RAY EXAM KNEE 4 OR MORE: CPT

## 2025-02-02 PROCEDURE — 74177 CT ABD & PELVIS W/CONTRAST: CPT

## 2025-02-02 PROCEDURE — 70486 CT MAXILLOFACIAL W/O DYE: CPT

## 2025-02-02 PROCEDURE — 71260 CT THORAX DX C+: CPT

## 2025-02-02 PROCEDURE — 70450 CT HEAD/BRAIN W/O DYE: CPT

## 2025-02-02 PROCEDURE — 73130 X-RAY EXAM OF HAND: CPT

## 2025-02-02 PROCEDURE — 76604 US EXAM CHEST: CPT | Performed by: STUDENT IN AN ORGANIZED HEALTH CARE EDUCATION/TRAINING PROGRAM

## 2025-02-02 RX ORDER — MORPHINE SULFATE 10 MG/ML
6 INJECTION, SOLUTION INTRAMUSCULAR; INTRAVENOUS ONCE
Status: COMPLETED | OUTPATIENT
Start: 2025-02-02 | End: 2025-02-02

## 2025-02-02 RX ORDER — OXYCODONE HYDROCHLORIDE 5 MG/1
5 TABLET ORAL EVERY 4 HOURS PRN
Qty: 8 TABLET | Refills: 0 | Status: SHIPPED | OUTPATIENT
Start: 2025-02-02 | End: 2025-02-12

## 2025-02-02 RX ORDER — KETOROLAC TROMETHAMINE 30 MG/ML
15 INJECTION, SOLUTION INTRAMUSCULAR; INTRAVENOUS ONCE
Status: COMPLETED | OUTPATIENT
Start: 2025-02-02 | End: 2025-02-02

## 2025-02-02 RX ADMIN — SODIUM CHLORIDE 1000 ML: 0.9 INJECTION, SOLUTION INTRAVENOUS at 15:50

## 2025-02-02 RX ADMIN — MORPHINE SULFATE 6 MG: 10 INJECTION INTRAVENOUS at 15:50

## 2025-02-02 RX ADMIN — KETOROLAC TROMETHAMINE 15 MG: 30 INJECTION, SOLUTION INTRAMUSCULAR; INTRAVENOUS at 14:11

## 2025-02-02 RX ADMIN — IOHEXOL 100 ML: 350 INJECTION, SOLUTION INTRAVENOUS at 14:31

## 2025-02-02 NOTE — DISCHARGE INSTRUCTIONS
The imaging studies that were obtained showed sign of a fractured left distal fibula.  Keep the splint applied and to follow-up with orthopedics.  Use the crutches to ambulate.    Keep the left leg elevated to help reduce pain/swelling.  For pain, you can take ibuprofen 600 mg every 6 hours and Tylenol 1000 mg every 6 hours.  You are also being provided with a short course of oxycodone as needed for breakthrough pain.  Take as directed.    An outpatient referral to orthopedics was provided.  Call the provided phone number tomorrow to set up the appointment.    Return to the emergency department for any concerning signs or symptoms.
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
## 2025-02-11 ENCOUNTER — TELEPHONE (OUTPATIENT)
Age: 35
End: 2025-02-11

## 2025-02-11 ENCOUNTER — TELEPHONE (OUTPATIENT)
Dept: GYNECOLOGIC ONCOLOGY | Facility: CLINIC | Age: 35
End: 2025-02-11

## 2025-02-11 NOTE — TELEPHONE ENCOUNTER
I left a message for the patient to call the office to reschedule the appointment with Dr. Monique that was cancelled for 2/24/2025.
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
## 2025-03-07 ENCOUNTER — TELEPHONE (OUTPATIENT)
Dept: GYNECOLOGIC ONCOLOGY | Facility: CLINIC | Age: 35
End: 2025-03-07

## 2025-03-07 NOTE — TELEPHONE ENCOUNTER
I spoke with the patient and rescheduled the appointment with Dr. Monique that was cancelled by the patient via [facility] for 3/31/2025. The appointment was rescheduled to 4/7/2025 at 10:00AM in [place].

## 2025-03-27 ENCOUNTER — APPOINTMENT (OUTPATIENT)
Dept: LAB | Facility: CLINIC | Age: 35
End: 2025-03-27

## 2025-03-27 DIAGNOSIS — C56.9 MALIGNANT NEOPLASM OF OVARY, UNSPECIFIED LATERALITY (HCC): ICD-10-CM

## 2025-03-27 LAB — AFP-TM SERPL-MCNC: 6.33 NG/ML (ref 0–9)

## 2025-03-27 PROCEDURE — 82105 ALPHA-FETOPROTEIN SERUM: CPT

## 2025-04-07 ENCOUNTER — OFFICE VISIT (OUTPATIENT)
Dept: GYNECOLOGIC ONCOLOGY | Facility: CLINIC | Age: 35
End: 2025-04-07
Payer: COMMERCIAL

## 2025-04-07 VITALS
HEIGHT: 67 IN | BODY MASS INDEX: 27.78 KG/M2 | DIASTOLIC BLOOD PRESSURE: 80 MMHG | WEIGHT: 177 LBS | OXYGEN SATURATION: 98 % | SYSTOLIC BLOOD PRESSURE: 128 MMHG | TEMPERATURE: 97.4 F | HEART RATE: 78 BPM | RESPIRATION RATE: 18 BRPM

## 2025-04-07 DIAGNOSIS — C56.9 MALIGNANT NEOPLASM OF OVARY, UNSPECIFIED LATERALITY (HCC): Primary | ICD-10-CM

## 2025-04-07 PROCEDURE — 99213 OFFICE O/P EST LOW 20 MIN: CPT | Performed by: OBSTETRICS & GYNECOLOGY
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
## 2025-04-07 NOTE — ASSESSMENT & PLAN NOTE
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  Will see the patient back in 3 months with repeat alpha-fetoprotein and .  Orders:    AFP tumor marker; Future    ; Future

## 2025-06-10 ENCOUNTER — OFFICE VISIT (OUTPATIENT)
Dept: FAMILY MEDICINE CLINIC | Facility: CLINIC | Age: 35
End: 2025-06-10
Payer: COMMERCIAL

## 2025-06-10 VITALS
BODY MASS INDEX: 27 KG/M2 | OXYGEN SATURATION: 99 % | HEART RATE: 62 BPM | SYSTOLIC BLOOD PRESSURE: 124 MMHG | DIASTOLIC BLOOD PRESSURE: 76 MMHG | WEIGHT: 172.4 LBS

## 2025-06-10 DIAGNOSIS — Z23 ENCOUNTER FOR IMMUNIZATION: ICD-10-CM

## 2025-06-10 DIAGNOSIS — Z00.00 ANNUAL PHYSICAL EXAM: Primary | ICD-10-CM

## 2025-06-10 PROBLEM — Z72.89 ENGAGES IN VAPING: Status: RESOLVED | Noted: 2023-11-13 | Resolved: 2025-06-10

## 2025-06-10 PROCEDURE — 99395 PREV VISIT EST AGE 18-39: CPT | Performed by: FAMILY MEDICINE

## 2025-06-10 PROCEDURE — 90471 IMMUNIZATION ADMIN: CPT

## 2025-06-10 PROCEDURE — 90715 TDAP VACCINE 7 YRS/> IM: CPT
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
## 2025-06-10 NOTE — PROGRESS NOTES
Adult Annual Physical  Name: Jennifer Hale      : 1990      MRN: [id number]  Encounter Provider: Silke Hair DO  Encounter Date: 6/10/2025   Encounter department: [facility]    :  Assessment & Plan  Annual physical exam    Orders:    CBC and Platelet; Future    Comprehensive metabolic panel; Future    Lipid panel; Future    Encounter for immunization    Orders:    TDAP VACCINE GREATER THAN OR EQUAL TO 6YO IM        Preventive Screenings:  - Diabetes Screening: screening up-to-date  - Cholesterol Screening: risks/benefits discussed and orders placed   - Hepatitis C screening: screening up-to-date   - HIV screening: screening up-to-date   - Cervical cancer screening: screening not indicated   - Colon cancer screening: screening not indicated   - Lung cancer screening: screening not indicated     Immunizations:  - Immunizations due: Tdap  - Risks/benefits immunizations discussed      Counseling/Anticipatory Guidance:  - Alcohol: discussed moderation in alcohol intake and recommendations for healthy alcohol use.   - Tobacco use: discussed harms of tobacco use and management options for quitting.   - Dental health: discussed importance of regular tooth brushing, flossing, and dental visits.   - Diet: discussed recommendations for a healthy/well-balanced diet.   - Exercise: the importance of regular exercise/physical activity was discussed. Recommend exercise 3-5 times per week for at least 30 minutes.       Depression Screening and Follow-up Plan: Patient was screened for depression during today's encounter. They screened negative with a PHQ-2 score of 2.        Return in about 1 year (around 6/10/2026) for Annual physical.      History of Present Illness     Chief Complaint   Patient presents with    Physical Exam       Adult Annual Physical:  Patient presents for annual physical.     Diet and Physical Activity:  - Diet/Nutrition: low carb diet and no special  diet.  - Exercise:. roller derby    Depression Screening:  - PHQ-2 Score: 2    General Health:  - Sleep: sleeps well and 7-8 hours of sleep on average. Now working night shift  - Hearing: normal hearing bilateral ears.  - Vision: no vision problems.  - Dental: regular dental visits.    /GYN Health:  - Follows with GYN: yes.   - Menopause: postmenopausal.   - Contraception: hysterectomy.      Review of Systems   Constitutional:  Negative for activity change, appetite change, chills, diaphoresis, fever and unexpected weight change.   HENT:  Negative for congestion, rhinorrhea, sore throat and trouble swallowing.    Eyes:  Negative for visual disturbance.   Respiratory:  Negative for cough, shortness of breath and wheezing.    Cardiovascular:  Negative for chest pain, palpitations and leg swelling.   Gastrointestinal:  Negative for abdominal pain, blood in stool, constipation and diarrhea.   Genitourinary:  Negative for difficulty urinating, dysuria, frequency and hematuria.   Musculoskeletal:  Negative for arthralgias, back pain and joint swelling.   Skin:  Negative for color change and rash.   Neurological:  Negative for dizziness, light-headedness and headaches.   Psychiatric/Behavioral:  Negative for dysphoric mood. The patient is not nervous/anxious.      Medications Ordered Prior to Encounter[1]   Social History[2]    Objective   /76 (BP Location: Left arm, Patient Position: Sitting, Cuff Size: Large)   Pulse 62   Wt 78.2 kg (172 lb 6.4 oz)   LMP 05/15/2023 (Exact Date)   SpO2 99%   BMI 27.00 kg/m²     Physical Exam  Vitals reviewed.   Constitutional:       Appearance: Normal appearance. She is normal weight.   HENT:      Head: Normocephalic and atraumatic.      Right Ear: Tympanic membrane, ear canal and external ear normal. There is no impacted cerumen.      Left Ear: Tympanic membrane, ear canal and external ear normal. There is no impacted cerumen.      Nose: Nose normal. No congestion.       Mouth/Throat:      Mouth: Mucous membranes are moist.      Pharynx: Oropharynx is clear. No oropharyngeal exudate.     Eyes:      Extraocular Movements: Extraocular movements intact.      Conjunctiva/sclera: Conjunctivae normal.      Pupils: Pupils are equal, round, and reactive to light.     Neck:      Thyroid: No thyroid mass or thyromegaly.     Cardiovascular:      Rate and Rhythm: Normal rate and regular rhythm.      Pulses: Normal pulses.      Heart sounds: Normal heart sounds. No murmur heard.  Pulmonary:      Effort: Pulmonary effort is normal. No respiratory distress.      Breath sounds: Normal breath sounds. No wheezing.   Abdominal:      General: Abdomen is flat. Bowel sounds are normal. There is no distension.      Palpations: Abdomen is soft.      Tenderness: There is no abdominal tenderness.     Musculoskeletal:      Cervical back: Normal range of motion and neck supple.      Right lower leg: No edema.      Left lower leg: No edema.     Skin:     General: Skin is warm and dry.     Neurological:      General: No focal deficit present.      Mental Status: She is alert.     Psychiatric:         Mood and Affect: Mood normal.         Behavior: Behavior normal.              [1]   Current Outpatient Medications on File Prior to Visit   Medication Sig Dispense Refill    cholecalciferol (VITAMIN D3) 1,000 units tablet Take 5,000 Units by mouth in the morning.      Multiple Vitamin (multivitamin) tablet Take 1 tablet by mouth in the morning.       No current facility-administered medications on file prior to visit.   [2]   Social History  Tobacco Use    Smoking status: Never    Smokeless tobacco: Never   Vaping Use    Vaping status: Never Used   Substance and Sexual Activity    Alcohol use: Yes     Alcohol/week: 5.0 standard drinks of alcohol     Types: 5 Standard drinks or equivalent per week     Comment: Occasional    Drug use: Yes     Types: Marijuana     Comment: daily vape last used 6/12    Sexual activity:  Yes     Partners: Male     Birth control/protection: None

## 2025-06-30 ENCOUNTER — APPOINTMENT (OUTPATIENT)
Dept: LAB | Facility: CLINIC | Age: 35
End: 2025-06-30
Payer: COMMERCIAL

## 2025-06-30 DIAGNOSIS — C56.9 MALIGNANT NEOPLASM OF OVARY, UNSPECIFIED LATERALITY (HCC): ICD-10-CM

## 2025-06-30 DIAGNOSIS — Z00.00 ANNUAL PHYSICAL EXAM: ICD-10-CM

## 2025-06-30 LAB
AFP-TM SERPL-MCNC: 5.59 NG/ML (ref 0–9)
ALBUMIN SERPL BCG-MCNC: 4.3 G/DL (ref 3.5–5)
ALP SERPL-CCNC: 72 U/L (ref 34–104)
ALT SERPL W P-5'-P-CCNC: 31 U/L (ref 7–52)
ANION GAP SERPL CALCULATED.3IONS-SCNC: 7 MMOL/L (ref 4–13)
AST SERPL W P-5'-P-CCNC: 27 U/L (ref 13–39)
BILIRUB SERPL-MCNC: 1.16 MG/DL (ref 0.2–1)
BUN SERPL-MCNC: 13 MG/DL (ref 5–25)
CALCIUM SERPL-MCNC: 8.9 MG/DL (ref 8.4–10.2)
CANCER AG125 SERPL-ACNC: 6.2 U/ML (ref 0–35)
CHLORIDE SERPL-SCNC: 104 MMOL/L (ref 96–108)
CHOLEST SERPL-MCNC: 223 MG/DL (ref ?–200)
CO2 SERPL-SCNC: 27 MMOL/L (ref 21–32)
CREAT SERPL-MCNC: 0.71 MG/DL (ref 0.6–1.3)
ERYTHROCYTE [DISTWIDTH] IN BLOOD BY AUTOMATED COUNT: 12.6 % (ref 11.6–15.1)
GFR SERPL CREATININE-BSD FRML MDRD: 109 ML/MIN/1.73SQ M
GLUCOSE P FAST SERPL-MCNC: 82 MG/DL (ref 65–99)
HCT VFR BLD AUTO: 41.8 % (ref 34.8–46.1)
HDLC SERPL-MCNC: 44 MG/DL
HGB BLD-MCNC: 14 G/DL (ref 11.5–15.4)
LDLC SERPL CALC-MCNC: 158 MG/DL (ref 0–100)
MCH RBC QN AUTO: 31 PG (ref 26.8–34.3)
MCHC RBC AUTO-ENTMCNC: 33.5 G/DL (ref 31.4–37.4)
MCV RBC AUTO: 93 FL (ref 82–98)
NONHDLC SERPL-MCNC: 179 MG/DL
PLATELET # BLD AUTO: 202 THOUSANDS/UL (ref 149–390)
PMV BLD AUTO: 11.5 FL (ref 8.9–12.7)
POTASSIUM SERPL-SCNC: 4 MMOL/L (ref 3.5–5.3)
PROT SERPL-MCNC: 6.9 G/DL (ref 6.4–8.4)
RBC # BLD AUTO: 4.51 MILLION/UL (ref 3.81–5.12)
SODIUM SERPL-SCNC: 138 MMOL/L (ref 135–147)
TRIGL SERPL-MCNC: 105 MG/DL (ref ?–150)
WBC # BLD AUTO: 5.13 THOUSAND/UL (ref 4.31–10.16)

## 2025-06-30 PROCEDURE — 82105 ALPHA-FETOPROTEIN SERUM: CPT

## 2025-06-30 PROCEDURE — 36415 COLL VENOUS BLD VENIPUNCTURE: CPT

## 2025-06-30 PROCEDURE — 86304 IMMUNOASSAY TUMOR CA 125: CPT

## 2025-06-30 PROCEDURE — 85027 COMPLETE CBC AUTOMATED: CPT

## 2025-06-30 PROCEDURE — 80053 COMPREHEN METABOLIC PANEL: CPT

## 2025-06-30 PROCEDURE — 80061 LIPID PANEL: CPT

## 2025-07-15 ENCOUNTER — OFFICE VISIT (OUTPATIENT)
Dept: GYNECOLOGIC ONCOLOGY | Facility: CLINIC | Age: 35
End: 2025-07-15
Payer: COMMERCIAL

## 2025-07-15 VITALS
OXYGEN SATURATION: 100 % | DIASTOLIC BLOOD PRESSURE: 70 MMHG | HEART RATE: 75 BPM | WEIGHT: 168.8 LBS | TEMPERATURE: 97.3 F | RESPIRATION RATE: 18 BRPM | SYSTOLIC BLOOD PRESSURE: 112 MMHG | BODY MASS INDEX: 26.49 KG/M2 | HEIGHT: 67 IN

## 2025-07-15 DIAGNOSIS — C56.9 MALIGNANT NEOPLASM OF OVARY, UNSPECIFIED LATERALITY (HCC): Primary | ICD-10-CM

## 2025-07-15 DIAGNOSIS — N95.1 HOT FLASH, MENOPAUSAL: ICD-10-CM

## 2025-07-15 PROCEDURE — 99214 OFFICE O/P EST MOD 30 MIN: CPT | Performed by: OBSTETRICS & GYNECOLOGY

## (undated) DEVICE — 4-PORT MANIFOLD: Brand: NEPTUNE 2

## (undated) DEVICE — PENCIL ELECTROSURG E-Z CLEAN -0035H

## (undated) DEVICE — INVIEW CLEAR LEGGINGS: Brand: CONVERTORS

## (undated) DEVICE — BETHLEHEM UNIVERSAL LAPAROTOMY: Brand: CARDINAL HEALTH

## (undated) DEVICE — SPONGE LAP 18 X 18 IN STRL RFD

## (undated) DEVICE — BETHLEHEM UNIVERSAL GYN LAP PK: Brand: CARDINAL HEALTH

## (undated) DEVICE — SUT VICRYL 3-0 SH 27 IN J416H

## (undated) DEVICE — TRAY FOLEY 16FR URIMETER SURESTEP

## (undated) DEVICE — SPONGE STICK WITH PVP-I: Brand: KENDALL

## (undated) DEVICE — CATH URETERAL 5FR X 70 CM FLEX TIP POLYUR BARD

## (undated) DEVICE — DRAPE EQUIPMENT RF WAND

## (undated) DEVICE — 40595 XL TRENDELENBURG POSITIONING KIT: Brand: 40595 XL TRENDELENBURG POSITIONING KIT

## (undated) DEVICE — SUT PDS II 1 TP-1 96 IN Z880G

## (undated) DEVICE — EXIDINE 4 PCT

## (undated) DEVICE — INTENDED FOR TISSUE SEPARATION, AND OTHER PROCEDURES THAT REQUIRE A SHARP SURGICAL BLADE TO PUNCTURE OR CUT.: Brand: BARD-PARKER SAFETY BLADES SIZE 11, STERILE

## (undated) DEVICE — SUT STRATAFIX SPIRAL PLUS 3-0 PS-2 30 X 30 CM SXMP2B408

## (undated) DEVICE — DRAPE TOWEL: Brand: CONVERTORS

## (undated) DEVICE — CLIP SURGI M-11.5 WITH 30 MEDIUM TITANIUM CLIPS BLUE

## (undated) DEVICE — TUBING SUCTION 5MM X 12 FT

## (undated) DEVICE — BULB SYRINGE,IRRIGATION WITH PROTECTIVE CAP: Brand: DOVER

## (undated) DEVICE — SUT VICRYL 0 CT-1 27 IN J260H

## (undated) DEVICE — SUT MONOCRYL 4-0 PS-2 27 IN Y426H

## (undated) DEVICE — DISPOSABLE OR TOWEL: Brand: CARDINAL HEALTH

## (undated) DEVICE — ABDOMINAL PAD: Brand: DERMACEA

## (undated) DEVICE — GLOVE PI ULTRA TOUCH SZ.7.5

## (undated) DEVICE — SUT VICRYL 0 REEL 54 IN J287G

## (undated) DEVICE — TELFA NON-ADHERENT ABSORBENT DRESSING: Brand: TELFA

## (undated) DEVICE — TOWEL SET X-RAY

## (undated) DEVICE — ADHESIVE SKIN HIGH VISCOSITY EXOFIN 1ML

## (undated) DEVICE — BOWL: 16OZ PEELPOUCH 75/CS 16/PLT: Brand: MEDEGEN MEDICAL PRODUCTS, LLC

## (undated) DEVICE — MEDI-VAC YANK SUCT HNDL W/TPRD BULBOUS TIP: Brand: CARDINAL HEALTH

## (undated) DEVICE — GLOVE INDICATOR PI UNDERGLOVE SZ 7.5 BLUE

## (undated) DEVICE — ASTOUND STANDARD SURGICAL GOWN, XL: Brand: CONVERTORS

## (undated) DEVICE — GLOVE SRG BIOGEL 7.5

## (undated) DEVICE — CATH URETHERAL CONNECTOR

## (undated) DEVICE — SUT VICRYL 0 CT-1 36 IN J946H

## (undated) DEVICE — INTENDED FOR TISSUE SEPARATION, AND OTHER PROCEDURES THAT REQUIRE A SHARP SURGICAL BLADE TO PUNCTURE OR CUT.: Brand: BARD-PARKER SAFETY BLADES SIZE 10, STERILE

## (undated) DEVICE — DRAPE FLUID WARMER (BIRD BATH)

## (undated) DEVICE — CHLORAPREP HI-LITE 26ML ORANGE

## (undated) DEVICE — ELECTRODE BLADE MOD  E-Z CLEAN 6.5IN -0014M

## (undated) DEVICE — GAUZE SPONGES,16 PLY: Brand: CURITY

## (undated) DEVICE — DRAPE LAPAROTOMY W/POUCHES

## (undated) DEVICE — MEDI-VAC YANKAUER SUCTION HANDLE W/BULBOUS AND CONTROL VENT: Brand: CARDINAL HEALTH

## (undated) DEVICE — INTENDED FOR TISSUE SEPARATION, AND OTHER PROCEDURES THAT REQUIRE A SHARP SURGICAL BLADE TO PUNCTURE OR CUT.: Brand: BARD-PARKER ® CARBON RIB-BACK BLADES

## (undated) DEVICE — PACK TUR

## (undated) DEVICE — SUT PLAIN 3-0 CTX 27 IN 873H

## (undated) DEVICE — IRRIG ENDO FLO TUBING

## (undated) DEVICE — INTENDED FOR TISSUE SEPARATION, AND OTHER PROCEDURES THAT REQUIRE A SHARP SURGICAL BLADE TO PUNCTURE OR CUT.: Brand: BARD-PARKER SAFETY BLADES SIZE 15, STERILE

## (undated) DEVICE — SUT PLAIN 2-0 CTX 27 IN 872H

## (undated) DEVICE — ENSEAL 20 CM SHAFT, LARGE JAW: Brand: ENSEAL X1

## (undated) DEVICE — PREMIUM DRY TRAY LF: Brand: MEDLINE INDUSTRIES, INC.

## (undated) DEVICE — PLUMEPEN PRO 10FT

## (undated) DEVICE — NEEDLE COUNTER LG W/RULER

## (undated) DEVICE — UNDER BUTTOCKS DRAPE: Brand: CONVERTORS